# Patient Record
Sex: MALE | Race: WHITE | NOT HISPANIC OR LATINO | Employment: OTHER | URBAN - METROPOLITAN AREA
[De-identification: names, ages, dates, MRNs, and addresses within clinical notes are randomized per-mention and may not be internally consistent; named-entity substitution may affect disease eponyms.]

---

## 2020-03-04 ENCOUNTER — HOSPITAL ENCOUNTER (INPATIENT)
Facility: HOSPITAL | Age: 70
LOS: 2 days | Discharge: NURSING, REHAB OR LONG-TERM CARE | DRG: 378 | End: 2020-03-06
Attending: EMERGENCY MEDICINE | Admitting: EMERGENCY MEDICINE
Payer: MEDICARE

## 2020-03-04 DIAGNOSIS — K92.2 UPPER GI BLEED: Primary | ICD-10-CM

## 2020-03-04 DIAGNOSIS — G40.89 OTHER SEIZURES: ICD-10-CM

## 2020-03-04 DIAGNOSIS — E87.20 LACTIC ACIDOSIS: ICD-10-CM

## 2020-03-04 DIAGNOSIS — D64.9 SYMPTOMATIC ANEMIA: ICD-10-CM

## 2020-03-04 DIAGNOSIS — G20.A1 PARKINSON DISEASE: ICD-10-CM

## 2020-03-04 DIAGNOSIS — I10 ESSENTIAL HYPERTENSION: ICD-10-CM

## 2020-03-04 DIAGNOSIS — E78.2 HYPERLIPIDEMIA, MIXED: ICD-10-CM

## 2020-03-04 DIAGNOSIS — R41.82 ALTERED MENTAL STATUS: ICD-10-CM

## 2020-03-04 DIAGNOSIS — D64.9 ANEMIA, UNSPECIFIED TYPE: ICD-10-CM

## 2020-03-04 LAB
ABO + RH BLD: NORMAL
ALBUMIN SERPL BCP-MCNC: 2.3 G/DL (ref 3.5–5.2)
ALP SERPL-CCNC: 86 U/L (ref 55–135)
ALT SERPL W/O P-5'-P-CCNC: 14 U/L (ref 10–44)
ANION GAP SERPL CALC-SCNC: 9 MMOL/L (ref 8–16)
APTT BLDCRRT: 24.2 SEC (ref 21–32)
AST SERPL-CCNC: 16 U/L (ref 10–40)
BASOPHILS # BLD AUTO: 0.02 K/UL (ref 0–0.2)
BASOPHILS # BLD AUTO: 0.03 K/UL (ref 0–0.2)
BASOPHILS # BLD AUTO: 0.03 K/UL (ref 0–0.2)
BASOPHILS NFR BLD: 0.2 % (ref 0–1.9)
BASOPHILS NFR BLD: 0.3 % (ref 0–1.9)
BASOPHILS NFR BLD: 0.4 % (ref 0–1.9)
BILIRUB SERPL-MCNC: 0.1 MG/DL (ref 0.1–1)
BILIRUB UR QL STRIP: NEGATIVE
BLD GP AB SCN CELLS X3 SERPL QL: NORMAL
BLD PROD TYP BPU: NORMAL
BLD PROD TYP BPU: NORMAL
BLOOD UNIT EXPIRATION DATE: NORMAL
BLOOD UNIT EXPIRATION DATE: NORMAL
BLOOD UNIT TYPE CODE: 6200
BLOOD UNIT TYPE CODE: 6200
BLOOD UNIT TYPE: NORMAL
BLOOD UNIT TYPE: NORMAL
BUN SERPL-MCNC: 72 MG/DL (ref 8–23)
CALCIUM SERPL-MCNC: 7.7 MG/DL (ref 8.7–10.5)
CHLORIDE SERPL-SCNC: 113 MMOL/L (ref 95–110)
CLARITY UR: CLEAR
CO2 SERPL-SCNC: 21 MMOL/L (ref 23–29)
CODING SYSTEM: NORMAL
CODING SYSTEM: NORMAL
COLOR UR: YELLOW
CREAT SERPL-MCNC: 0.9 MG/DL (ref 0.5–1.4)
DIFFERENTIAL METHOD: ABNORMAL
DISPENSE STATUS: NORMAL
DISPENSE STATUS: NORMAL
EOSINOPHIL # BLD AUTO: 0 K/UL (ref 0–0.5)
EOSINOPHIL # BLD AUTO: 0 K/UL (ref 0–0.5)
EOSINOPHIL # BLD AUTO: 0.1 K/UL (ref 0–0.5)
EOSINOPHIL NFR BLD: 0.3 % (ref 0–8)
EOSINOPHIL NFR BLD: 0.4 % (ref 0–8)
EOSINOPHIL NFR BLD: 1 % (ref 0–8)
ERYTHROCYTE [DISTWIDTH] IN BLOOD BY AUTOMATED COUNT: 15 % (ref 11.5–14.5)
ERYTHROCYTE [DISTWIDTH] IN BLOOD BY AUTOMATED COUNT: 15.3 % (ref 11.5–14.5)
ERYTHROCYTE [DISTWIDTH] IN BLOOD BY AUTOMATED COUNT: 15.9 % (ref 11.5–14.5)
EST. GFR  (AFRICAN AMERICAN): >60 ML/MIN/1.73 M^2
EST. GFR  (NON AFRICAN AMERICAN): >60 ML/MIN/1.73 M^2
GLUCOSE SERPL-MCNC: 110 MG/DL (ref 70–110)
GLUCOSE UR QL STRIP: NEGATIVE
HCT VFR BLD AUTO: 18.2 % (ref 40–54)
HCT VFR BLD AUTO: 19.1 % (ref 40–54)
HCT VFR BLD AUTO: 21.7 % (ref 40–54)
HCT VFR BLD AUTO: 25.1 % (ref 40–54)
HCV AB SERPL QL IA: NEGATIVE
HGB BLD-MCNC: 5.3 G/DL (ref 14–18)
HGB BLD-MCNC: 5.5 G/DL (ref 14–18)
HGB BLD-MCNC: 6.6 G/DL (ref 14–18)
HGB BLD-MCNC: 7.9 G/DL (ref 14–18)
HGB UR QL STRIP: NEGATIVE
IMM GRANULOCYTES # BLD AUTO: 0.06 K/UL (ref 0–0.04)
IMM GRANULOCYTES # BLD AUTO: 0.06 K/UL (ref 0–0.04)
IMM GRANULOCYTES # BLD AUTO: 0.1 K/UL (ref 0–0.04)
IMM GRANULOCYTES NFR BLD AUTO: 0.7 % (ref 0–0.5)
IMM GRANULOCYTES NFR BLD AUTO: 0.7 % (ref 0–0.5)
IMM GRANULOCYTES NFR BLD AUTO: 0.9 % (ref 0–0.5)
INR PPP: 1 (ref 0.8–1.2)
KETONES UR QL STRIP: NEGATIVE
LACTATE SERPL-SCNC: 2.4 MMOL/L (ref 0.5–2.2)
LEUKOCYTE ESTERASE UR QL STRIP: NEGATIVE
LYMPHOCYTES # BLD AUTO: 0.5 K/UL (ref 1–4.8)
LYMPHOCYTES # BLD AUTO: 0.9 K/UL (ref 1–4.8)
LYMPHOCYTES # BLD AUTO: 1.1 K/UL (ref 1–4.8)
LYMPHOCYTES NFR BLD: 11.4 % (ref 18–48)
LYMPHOCYTES NFR BLD: 13.2 % (ref 18–48)
LYMPHOCYTES NFR BLD: 4.7 % (ref 18–48)
MCH RBC QN AUTO: 24.6 PG (ref 27–31)
MCH RBC QN AUTO: 25.8 PG (ref 27–31)
MCH RBC QN AUTO: 26.6 PG (ref 27–31)
MCHC RBC AUTO-ENTMCNC: 28.8 G/DL (ref 32–36)
MCHC RBC AUTO-ENTMCNC: 30.4 G/DL (ref 32–36)
MCHC RBC AUTO-ENTMCNC: 31.5 G/DL (ref 32–36)
MCV RBC AUTO: 85 FL (ref 82–98)
MONOCYTES # BLD AUTO: 0.6 K/UL (ref 0.3–1)
MONOCYTES # BLD AUTO: 0.7 K/UL (ref 0.3–1)
MONOCYTES # BLD AUTO: 0.9 K/UL (ref 0.3–1)
MONOCYTES NFR BLD: 10.9 % (ref 4–15)
MONOCYTES NFR BLD: 5.3 % (ref 4–15)
MONOCYTES NFR BLD: 9 % (ref 4–15)
NEUTROPHILS # BLD AUTO: 6.4 K/UL (ref 1.8–7.7)
NEUTROPHILS # BLD AUTO: 6.5 K/UL (ref 1.8–7.7)
NEUTROPHILS # BLD AUTO: 9.6 K/UL (ref 1.8–7.7)
NEUTROPHILS NFR BLD: 73.9 % (ref 38–73)
NEUTROPHILS NFR BLD: 78.1 % (ref 38–73)
NEUTROPHILS NFR BLD: 88.6 % (ref 38–73)
NITRITE UR QL STRIP: NEGATIVE
NRBC BLD-RTO: 0 /100 WBC
NUM UNITS TRANS PACKED RBC: NORMAL
NUM UNITS TRANS PACKED RBC: NORMAL
PH UR STRIP: 6 [PH] (ref 5–8)
PLATELET # BLD AUTO: 198 K/UL (ref 150–350)
PLATELET # BLD AUTO: 205 K/UL (ref 150–350)
PLATELET # BLD AUTO: 218 K/UL (ref 150–350)
PMV BLD AUTO: 10 FL (ref 9.2–12.9)
PMV BLD AUTO: 10.4 FL (ref 9.2–12.9)
PMV BLD AUTO: 10.4 FL (ref 9.2–12.9)
POTASSIUM SERPL-SCNC: 4.9 MMOL/L (ref 3.5–5.1)
PROT SERPL-MCNC: 5 G/DL (ref 6–8.4)
PROT UR QL STRIP: NEGATIVE
PROTHROMBIN TIME: 11 SEC (ref 9–12.5)
RBC # BLD AUTO: 2.24 M/UL (ref 4.6–6.2)
RBC # BLD AUTO: 2.56 M/UL (ref 4.6–6.2)
RBC # BLD AUTO: 2.97 M/UL (ref 4.6–6.2)
SODIUM SERPL-SCNC: 143 MMOL/L (ref 136–145)
SP GR UR STRIP: 1.02 (ref 1–1.03)
T4 FREE SERPL-MCNC: 0.83 NG/DL (ref 0.71–1.51)
TSH SERPL DL<=0.005 MIU/L-ACNC: 4.69 UIU/ML (ref 0.4–4)
URN SPEC COLLECT METH UR: NORMAL
UROBILINOGEN UR STRIP-ACNC: NEGATIVE EU/DL
WBC # BLD AUTO: 10.86 K/UL (ref 3.9–12.7)
WBC # BLD AUTO: 8.26 K/UL (ref 3.9–12.7)
WBC # BLD AUTO: 8.65 K/UL (ref 3.9–12.7)

## 2020-03-04 PROCEDURE — 63600175 PHARM REV CODE 636 W HCPCS: Performed by: PHYSICIAN ASSISTANT

## 2020-03-04 PROCEDURE — 99222 1ST HOSP IP/OBS MODERATE 55: CPT | Mod: ,,, | Performed by: INTERNAL MEDICINE

## 2020-03-04 PROCEDURE — 86803 HEPATITIS C AB TEST: CPT

## 2020-03-04 PROCEDURE — 83605 ASSAY OF LACTIC ACID: CPT

## 2020-03-04 PROCEDURE — 93010 EKG 12-LEAD: ICD-10-PCS | Mod: ,,, | Performed by: INTERNAL MEDICINE

## 2020-03-04 PROCEDURE — 85018 HEMOGLOBIN: CPT

## 2020-03-04 PROCEDURE — 93010 ELECTROCARDIOGRAM REPORT: CPT | Mod: ,,, | Performed by: INTERNAL MEDICINE

## 2020-03-04 PROCEDURE — 36430 TRANSFUSION BLD/BLD COMPNT: CPT

## 2020-03-04 PROCEDURE — 85014 HEMATOCRIT: CPT

## 2020-03-04 PROCEDURE — 36415 COLL VENOUS BLD VENIPUNCTURE: CPT

## 2020-03-04 PROCEDURE — C9113 INJ PANTOPRAZOLE SODIUM, VIA: HCPCS | Performed by: EMERGENCY MEDICINE

## 2020-03-04 PROCEDURE — 85730 THROMBOPLASTIN TIME PARTIAL: CPT

## 2020-03-04 PROCEDURE — 96361 HYDRATE IV INFUSION ADD-ON: CPT

## 2020-03-04 PROCEDURE — 85610 PROTHROMBIN TIME: CPT

## 2020-03-04 PROCEDURE — 25000003 PHARM REV CODE 250: Performed by: PHYSICIAN ASSISTANT

## 2020-03-04 PROCEDURE — 93005 ELECTROCARDIOGRAM TRACING: CPT

## 2020-03-04 PROCEDURE — 96360 HYDRATION IV INFUSION INIT: CPT

## 2020-03-04 PROCEDURE — 81003 URINALYSIS AUTO W/O SCOPE: CPT

## 2020-03-04 PROCEDURE — 84443 ASSAY THYROID STIM HORMONE: CPT

## 2020-03-04 PROCEDURE — 21400001 HC TELEMETRY ROOM

## 2020-03-04 PROCEDURE — 99222 PR INITIAL HOSPITAL CARE,LEVL II: ICD-10-PCS | Mod: ,,, | Performed by: INTERNAL MEDICINE

## 2020-03-04 PROCEDURE — C9113 INJ PANTOPRAZOLE SODIUM, VIA: HCPCS | Performed by: PHYSICIAN ASSISTANT

## 2020-03-04 PROCEDURE — 85025 COMPLETE CBC W/AUTO DIFF WBC: CPT | Mod: 91

## 2020-03-04 PROCEDURE — 86850 RBC ANTIBODY SCREEN: CPT

## 2020-03-04 PROCEDURE — 87040 BLOOD CULTURE FOR BACTERIA: CPT | Mod: 59

## 2020-03-04 PROCEDURE — P9016 RBC LEUKOCYTES REDUCED: HCPCS

## 2020-03-04 PROCEDURE — 86920 COMPATIBILITY TEST SPIN: CPT

## 2020-03-04 PROCEDURE — 80053 COMPREHEN METABOLIC PANEL: CPT

## 2020-03-04 PROCEDURE — 84439 ASSAY OF FREE THYROXINE: CPT

## 2020-03-04 PROCEDURE — 99291 CRITICAL CARE FIRST HOUR: CPT | Mod: 25

## 2020-03-04 PROCEDURE — 63600175 PHARM REV CODE 636 W HCPCS: Performed by: EMERGENCY MEDICINE

## 2020-03-04 RX ORDER — SIMVASTATIN 20 MG/1
20 TABLET, FILM COATED ORAL NIGHTLY
Status: DISCONTINUED | OUTPATIENT
Start: 2020-03-04 | End: 2020-03-06 | Stop reason: HOSPADM

## 2020-03-04 RX ORDER — HYDRALAZINE HYDROCHLORIDE 20 MG/ML
10 INJECTION INTRAMUSCULAR; INTRAVENOUS EVERY 6 HOURS PRN
Status: DISCONTINUED | OUTPATIENT
Start: 2020-03-04 | End: 2020-03-06 | Stop reason: HOSPADM

## 2020-03-04 RX ORDER — CARBIDOPA AND LEVODOPA 25; 100 MG/1; MG/1
1 TABLET ORAL 3 TIMES DAILY
Status: DISCONTINUED | OUTPATIENT
Start: 2020-03-04 | End: 2020-03-06 | Stop reason: HOSPADM

## 2020-03-04 RX ORDER — PANTOPRAZOLE SODIUM 40 MG/10ML
40 INJECTION, POWDER, LYOPHILIZED, FOR SOLUTION INTRAVENOUS 2 TIMES DAILY
Status: DISCONTINUED | OUTPATIENT
Start: 2020-03-04 | End: 2020-03-06 | Stop reason: HOSPADM

## 2020-03-04 RX ORDER — ASPIRIN 81 MG/1
TABLET ORAL
Status: ON HOLD | COMMUNITY
End: 2020-03-08 | Stop reason: SINTOL

## 2020-03-04 RX ORDER — PANTOPRAZOLE SODIUM 40 MG/10ML
40 INJECTION, POWDER, LYOPHILIZED, FOR SOLUTION INTRAVENOUS
Status: COMPLETED | OUTPATIENT
Start: 2020-03-04 | End: 2020-03-04

## 2020-03-04 RX ORDER — CARBIDOPA AND LEVODOPA 25; 100 MG/1; MG/1
1 TABLET ORAL 3 TIMES DAILY
COMMUNITY

## 2020-03-04 RX ORDER — HYDROCODONE BITARTRATE AND ACETAMINOPHEN 500; 5 MG/1; MG/1
TABLET ORAL
Status: DISCONTINUED | OUTPATIENT
Start: 2020-03-05 | End: 2020-03-06 | Stop reason: HOSPADM

## 2020-03-04 RX ORDER — ACETAMINOPHEN 325 MG/1
650 TABLET ORAL EVERY 6 HOURS PRN
Status: DISCONTINUED | OUTPATIENT
Start: 2020-03-04 | End: 2020-03-06 | Stop reason: HOSPADM

## 2020-03-04 RX ORDER — HYDROCODONE BITARTRATE AND ACETAMINOPHEN 500; 5 MG/1; MG/1
TABLET ORAL
Status: DISCONTINUED | OUTPATIENT
Start: 2020-03-04 | End: 2020-03-06 | Stop reason: HOSPADM

## 2020-03-04 RX ORDER — HYDROCODONE BITARTRATE AND ACETAMINOPHEN 5; 325 MG/1; MG/1
1 TABLET ORAL EVERY 6 HOURS PRN
Status: DISCONTINUED | OUTPATIENT
Start: 2020-03-04 | End: 2020-03-06 | Stop reason: HOSPADM

## 2020-03-04 RX ORDER — DEXTROMETHORPHAN HYDROBROMIDE, GUAIFENESIN 5; 100 MG/5ML; MG/5ML
650 LIQUID ORAL EVERY 6 HOURS PRN
COMMUNITY

## 2020-03-04 RX ORDER — ONDANSETRON 8 MG/1
8 TABLET, ORALLY DISINTEGRATING ORAL EVERY 8 HOURS PRN
Status: DISCONTINUED | OUTPATIENT
Start: 2020-03-04 | End: 2020-03-06 | Stop reason: HOSPADM

## 2020-03-04 RX ORDER — TERAZOSIN 2 MG/1
2 CAPSULE ORAL NIGHTLY
COMMUNITY
End: 2020-03-07 | Stop reason: SINTOL

## 2020-03-04 RX ORDER — LEVETIRACETAM 100 MG/ML
500 SOLUTION ORAL 2 TIMES DAILY
Status: DISCONTINUED | OUTPATIENT
Start: 2020-03-04 | End: 2020-03-06 | Stop reason: HOSPADM

## 2020-03-04 RX ORDER — ACETAMINOPHEN 500 MG
5000 TABLET ORAL
COMMUNITY

## 2020-03-04 RX ORDER — CYANOCOBALAMIN 1000 UG/ML
1000 INJECTION, SOLUTION INTRAMUSCULAR; SUBCUTANEOUS
COMMUNITY

## 2020-03-04 RX ORDER — POTASSIUM CHLORIDE 20 MEQ/1
20 TABLET, EXTENDED RELEASE ORAL
COMMUNITY

## 2020-03-04 RX ORDER — SIMVASTATIN 20 MG/1
20 TABLET, FILM COATED ORAL NIGHTLY
COMMUNITY

## 2020-03-04 RX ORDER — LEVETIRACETAM 100 MG/ML
500 SOLUTION ORAL 2 TIMES DAILY
COMMUNITY
Start: 2019-08-26 | End: 2020-08-25

## 2020-03-04 RX ORDER — LYSINE HCL 500 MG
1 TABLET ORAL
COMMUNITY
End: 2020-03-04

## 2020-03-04 RX ADMIN — PANTOPRAZOLE SODIUM 40 MG: 40 INJECTION, POWDER, LYOPHILIZED, FOR SOLUTION INTRAVENOUS at 11:03

## 2020-03-04 RX ADMIN — SIMVASTATIN 20 MG: 20 TABLET, FILM COATED ORAL at 09:03

## 2020-03-04 RX ADMIN — CARBIDOPA AND LEVODOPA 1 TABLET: 25; 100 TABLET ORAL at 09:03

## 2020-03-04 RX ADMIN — PANTOPRAZOLE SODIUM 40 MG: 40 INJECTION, POWDER, LYOPHILIZED, FOR SOLUTION INTRAVENOUS at 09:03

## 2020-03-04 RX ADMIN — HYDROCODONE BITARTRATE AND ACETAMINOPHEN 1 TABLET: 5; 325 TABLET ORAL at 05:03

## 2020-03-04 RX ADMIN — SODIUM CHLORIDE: 0.9 INJECTION, SOLUTION INTRAVENOUS at 05:03

## 2020-03-04 RX ADMIN — LEVETIRACETAM 500 MG: 100 SOLUTION ORAL at 10:03

## 2020-03-04 RX ADMIN — CARBIDOPA AND LEVODOPA 1 TABLET: 25; 100 TABLET ORAL at 02:03

## 2020-03-04 RX ADMIN — SODIUM CHLORIDE, SODIUM LACTATE, POTASSIUM CHLORIDE, AND CALCIUM CHLORIDE 1000 ML: .6; .31; .03; .02 INJECTION, SOLUTION INTRAVENOUS at 07:03

## 2020-03-04 NOTE — ED NOTES
Report received from MARSHALL Burgess. Pt. Resting in bed. No acute distress, RR equal and non labored. Bed in low, locked, and call light in reach. Side rails up X 2. Will continue to monitor.

## 2020-03-04 NOTE — ED NOTES
Patient sent to ER by [facility] for AMS. Patient appeared confused more than his normal according to staff. [facility]ASI reports hypotension with systolic blood pressure in the 80's and administered normal saline 1 liter. Patient is now oriented to person only after 1 liter bolus.     Patient moved to ED room ** via **, patient assisted onto stretcher and changed into a gown. Patient placed on cardiac monitor, continuous pulse oximetry and automatic blood pressure cuff. Bed placed in low locked position, side rails up x 2, call light is within reach of patient or family, orientation to room and explanation of wait provided to family and patient, alarms set and turned on for monitor and pulse ox, awaiting MD evaluation and orders, will continue to monitor.    Patient identifies self as Adebayo Oneil      LOC: The patient is awake, alert and aware of environment with an appropriate affect, the patient is oriented to person only and speaking appropriately.  APPEARANCE: Patient resting comfortably and in no acute distress, patient is clean and well groomed, patient's clothing is properly fastened.  SKIN: The skin is warm and dry, color consistent with ethnicity, patient has normal skin turgor and moist mucus membranes, no breakdown or bruising noted.  MUSCULOSKELETAL: Patient moving upper extremities well, no obvious swelling or deformities noted.  RESPIRATORY: Airway is open and patent, respirations are spontaneous, patient has a normal effort and rate, no accessory muscle use noted.  CARDIAC: Patient has a normal rate and rhythm, no periphreal edema noted, capillary refill < 3 seconds.  ABDOMEN: Soft and non tender to palpation, no distention noted.  NEUROLOGIC: PERRL, 3 mm bilaterally, eyes open spontaneously, behavior appropriate to situation, facial expression symmetrical.

## 2020-03-04 NOTE — ED NOTES
In and out catheter performed using sterile technique. Urine collected on first attempt. Pt tolerated well.

## 2020-03-04 NOTE — ED NOTES
Pt. Resting in bed and has stopped pulling at lines and monitoring equipment. No acute distress, RR equal and non labored, VSS. Bed in low, locked, and call light in reach. Side rails up X 2. Will continue to monitor.

## 2020-03-04 NOTE — ED NOTES
Pt moved to hospital bed at this time. Pt. Resting in bed.  RR equal and non labored, VSS. Bed in low, locked, and call light in reach. Side rails up X 2. Will continue to monitor.

## 2020-03-04 NOTE — SUBJECTIVE & OBJECTIVE
Past Medical History:   Diagnosis Date    Abdominal aortic aneurysm     Acute bronchospasm     Anxiety disorder due to known physiological condition     Atherosclerosis     Candidiasis of skin and nail     Chronic constipation     Conduct disorder, unspecified     Deficiency of other specified B group vitamins     Dehydration     Essential hypertension     Extended spectrum beta lactamase (ESBL) resistance     Generalized anxiety disorder     GERD with esophagitis     Hyperlipidemia, mixed     Other seasonal allergic rhinitis     Other seizures     Parkinson disease     Seizure     Ulcerative colitis     Vitamin D deficiency        No past surgical history on file.    Review of patient's allergies indicates:  No Known Allergies    No current facility-administered medications on file prior to encounter.      Current Outpatient Medications on File Prior to Encounter   Medication Sig    aspirin (ECOTRIN) 81 MG EC tablet aspirin 81 mg tablet,delayed release   Direction: 1 tablet; 81 MG; Once a day;  Dispense Unit: Tablet Delayed Release;  Dose Route: Orally  Date Received : 06/03/2016    CALCIUM CARBONATE ORAL Take 500 mg by mouth once daily.    calcium carbonate-vit D3-min 600 mg calcium- 400 unit Tab Take 1 tablet by mouth.    carbidopa-levodopa  mg (SINEMET)  mg per tablet Take 1 tablet by mouth 3 (three) times daily.     cholecalciferol, vitamin D3, 125 mcg (5,000 unit) Tab Take 5,000 Units by mouth.    cyanocobalamin 1,000 mcg/mL injection Inject 1,000 mcg into the muscle.     levETIRAcetam (KEPPRA) 100 mg/mL Soln Take 500 mg by mouth 2 (two) times daily.     potassium chloride SA (K-DUR,KLOR-CON) 20 MEQ tablet Take 20 mEq by mouth.    simvastatin (ZOCOR) 20 MG tablet Take 20 mg by mouth every evening.     terazosin (HYTRIN) 2 MG capsule Take 2 mg by mouth every evening.     acetaminophen (TYLENOL) 650 MG TbSR Take 650 mg by mouth every 6 (six) hours as needed.     Family  History     None        Tobacco Use    Smoking status: Not on file   Substance and Sexual Activity    Alcohol use: Not on file    Drug use: Not on file    Sexual activity: Not on file     Review of Systems   Unable to perform ROS: Dementia     Objective:     Vital Signs (Most Recent):  Temp: 97.1 °F (36.2 °C) (03/04/20 1208)  Pulse: 79 (03/04/20 1208)  Resp: 17 (03/04/20 1208)  BP: 96/60 (03/04/20 1208)  SpO2: 100 % (03/04/20 1208) Vital Signs (24h Range):  Temp:  [97 °F (36.1 °C)-97.7 °F (36.5 °C)] 97.1 °F (36.2 °C)  Pulse:  [68-86] 79  Resp:  [11-23] 17  SpO2:  [98 %-100 %] 100 %  BP: ()/(51-76) 96/60     Weight: 66.6 kg (146 lb 13.2 oz)  There is no height or weight on file to calculate BMI.    Physical Exam   Constitutional: He appears well-developed and well-nourished.   HENT:   Head: Normocephalic and atraumatic.   Eyes: Conjunctivae are normal.   Neck: Neck supple. No JVD present.   Cardiovascular: Normal rate, regular rhythm and normal heart sounds.   Pulmonary/Chest: Effort normal and breath sounds normal. He has no wheezes.   Abdominal: Soft. Bowel sounds are normal. He exhibits no distension. There is no tenderness.   Musculoskeletal: Normal range of motion.   Bilateral upper extremity contractures noted.    Neurological: He is alert.   Oriented to person. Disoriented to place and time.    Skin: Skin is warm and dry. No rash noted.   Psychiatric: He has a normal mood and affect. His behavior is normal. Thought content normal.   Nursing note and vitals reviewed.          Significant Labs:   CBC:   Recent Labs   Lab 03/04/20  0746 03/04/20  1015   WBC 10.86  --    HGB 5.5* 5.3*   HCT 19.1* 18.2*     --      CMP:   Recent Labs   Lab 03/04/20  0746      K 4.9   *   CO2 21*      BUN 72*   CREATININE 0.9   CALCIUM 7.7*   PROT 5.0*   ALBUMIN 2.3*   BILITOT 0.1   ALKPHOS 86   AST 16   ALT 14   ANIONGAP 9   EGFRNONAA >60     Lactic Acid:   Recent Labs   Lab 03/04/20  0746    LACTATE 2.4*     All pertinent labs within the past 24 hours have been reviewed.    Significant Imaging: I have reviewed all pertinent imaging results/findings within the past 24 hours.    Imaging Results          X-Ray Chest AP Portable (Final result)  Result time 03/04/20 09:29:53    Final result by Luiz Kinney MD (03/04/20 09:29:53)                 Impression:      Significant elevation of the right hemidiaphragm with likely adjacent atelectasis, otherwise no acute radiographic abnormality in the chest.      Electronically signed by: Luiz Kinney  Date:    03/04/2020  Time:    09:29             Narrative:    EXAMINATION:  XR CHEST AP PORTABLE    CLINICAL HISTORY:  AMS;    TECHNIQUE:  Single frontal view of the chest was performed.    COMPARISON:  None    FINDINGS:  Cardiac leads project over the chest.  Significant elevation of the right hemidiaphragm with likely adjacent atelectasis.  No large consolidation or effusion.  No pneumothorax.  Visualized osseous structures appear intact.  Degenerative changes of both shoulders.                               CT Head Without Contrast (Final result)  Result time 03/04/20 08:27:09    Final result by Luiz Kinney MD (03/04/20 08:27:09)                 Impression:      No acute intracranial infarct or hemorrhage.    Prominence of the ventricular system suspicious for normal pressure hydrocephalus.    All CT scans at this facility use dose modulation, iterative reconstruction, and/or weight base dosing when appropriate to reduce radiation dose to as low as reasonably achievable.      Electronically signed by: Luiz Kinney  Date:    03/04/2020  Time:    08:27             Narrative:    EXAMINATION:  CT HEAD WITHOUT CONTRAST    CLINICAL HISTORY:  Confusion/delirium, altered LOC, unexplained;    TECHNIQUE:  Contiguous axial images were obtained from the skull base through the vertex without intravenous contrast.    COMPARISON:  None    FINDINGS:  No  intracranial hemorrhage. No mass effect or midline shift. No abnormal parenchymal hypoattenuation to suggest acute or recent major vascular territory cerebral infarct.  Mild prominence of the ventricular system with crowding of the sulci at the vertex and upward bowing of the corpus callosum concerning for normal pressure hydrocephalus.  The paranasal sinuses and mastoid air cells are clear.  No concerning osseous findings.

## 2020-03-04 NOTE — ED NOTES
Ok to proceed with blood transfusion at this time per Dr. Triana. Consent signed by MD and 2 nurse signature for emergent blood as patient is unable to sign for himself s/t to cognitive status.

## 2020-03-04 NOTE — H&P
[facility] Hospital Medicine  History & Physical    Patient Name: Adebayo Oneil  MRN: [id number]  Admission Date: 3/4/2020  Attending Physician: Attila Triana MD   Primary Care Provider: Provider [facility]         Patient information was obtained from patient, past medical records and ER records.     Subjective:     Principal Problem:Upper GI bleed    Chief Complaint:   Chief Complaint   Patient presents with    Altered Mental Status     [facility] , +[facility], hypotension and weak         HPI: Adebayo Oneil is a 69 year old male with Parkinson disease, hypertension and anxiety who [facility] at [facility] [place] and presented to the ED with reports of altered mentation upon standing this morning. The patient complains of right shoulder pain, but is unable to provide any additional history due to a baseline of disorientation. Upon review of his records sent by the nursing home, the patient is currently receiving Augmentin for a UTI and he had a BMP of 1/15/20 which showed a BUN of 25, creatinine of 0.88. In Care Everywhere, on 9/15/19, he had a hemoglobin of 12.8, BUN of 31 and creatinine of 1.52. Today, he was found to have a hemoglobin of 5.5, creatinine of 0.9 and BUN of 72, total protein of 5, albumin of 2.3 and lactic acid of 2.4. His code status is noted to be DNR and Danni Howell is his power of .     Past Medical History:   Diagnosis Date    Abdominal aortic aneurysm     Acute bronchospasm     Anxiety disorder due to known physiological condition     Atherosclerosis     Candidiasis of skin and nail     Chronic constipation     Conduct disorder, unspecified     Deficiency of other specified B group vitamins     Dehydration     Essential hypertension     Extended spectrum beta lactamase (ESBL) resistance     Generalized anxiety disorder     GERD with esophagitis     Hyperlipidemia, mixed     Other seasonal allergic rhinitis     Other seizures      Parkinson disease     Seizure     Ulcerative colitis     Vitamin D deficiency        No past surgical history on file.    Review of patient's allergies indicates:  No Known Allergies    No current facility-administered medications on file prior to encounter.      Current Outpatient Medications on File Prior to Encounter   Medication Sig    aspirin (ECOTRIN) 81 MG EC tablet aspirin 81 mg tablet,delayed release   Direction: 1 tablet; 81 MG; Once a day;  Dispense Unit: Tablet Delayed Release;  Dose Route: Orally  Date Received : 06/03/2016    CALCIUM CARBONATE ORAL Take 500 mg by mouth once daily.    calcium carbonate-vit D3-min 600 mg calcium- 400 unit Tab Take 1 tablet by mouth.    carbidopa-levodopa  mg (SINEMET)  mg per tablet Take 1 tablet by mouth 3 (three) times daily.     cholecalciferol, vitamin D3, 125 mcg (5,000 unit) Tab Take 5,000 Units by mouth.    cyanocobalamin 1,000 mcg/mL injection Inject 1,000 mcg into the muscle.     levETIRAcetam (KEPPRA) 100 mg/mL Soln Take 500 mg by mouth 2 (two) times daily.     potassium chloride SA (K-DUR,KLOR-CON) 20 MEQ tablet Take 20 mEq by mouth.    simvastatin (ZOCOR) 20 MG tablet Take 20 mg by mouth every evening.     terazosin (HYTRIN) 2 MG capsule Take 2 mg by mouth every evening.     acetaminophen (TYLENOL) 650 MG TbSR Take 650 mg by mouth every 6 (six) hours as needed.     Family History     Reviewed and not pertinent.         Tobacco Use    Smoking status: Not on file   Substance and Sexual Activity    Alcohol use: Not on file    Drug use: Not on file    Sexual activity: Not on file     Review of Systems   Unable to perform ROS: Dementia     Objective:     Vital Signs (Most Recent):  Temp: 97.1 °F (36.2 °C) (03/04/20 1208)  Pulse: 79 (03/04/20 1208)  Resp: 17 (03/04/20 1208)  BP: 96/60 (03/04/20 1208)  SpO2: 100 % (03/04/20 1208) Vital Signs (24h Range):  Temp:  [97 °F (36.1 °C)-97.7 °F (36.5 °C)] 97.1 °F (36.2 °C)  Pulse:  [68-86]  79  Resp:  [11-23] 17  SpO2:  [98 %-100 %] 100 %  BP: ()/(51-76) 96/60     Weight: 66.6 kg (146 lb 13.2 oz)  There is no height or weight on file to calculate BMI.    Physical Exam   Constitutional: He appears well-developed and well-nourished.   HENT:   Head: Normocephalic and atraumatic.   Eyes: Conjunctivae are normal.   Neck: Neck supple. No JVD present.   Cardiovascular: Normal rate, regular rhythm and normal heart sounds.   Pulmonary/Chest: Effort normal and breath sounds normal. He has no wheezes.   Abdominal: Soft. Bowel sounds are normal. He exhibits no distension. There is no tenderness.   Musculoskeletal: Normal range of motion.   Bilateral upper extremity contractures noted.    Neurological: He is alert.   Oriented to person. Disoriented to place and time.    Skin: Skin is warm and dry. No rash noted.   Psychiatric: He has a normal mood and affect. His behavior is normal. Thought content normal.   Nursing note and vitals reviewed.          Significant Labs:   CBC:   Recent Labs   Lab 03/04/20  0746 03/04/20  1015   WBC 10.86  --    HGB 5.5* 5.3*   HCT 19.1* 18.2*     --      CMP:   Recent Labs   Lab 03/04/20  0746      K 4.9   *   CO2 21*      BUN 72*   CREATININE 0.9   CALCIUM 7.7*   PROT 5.0*   ALBUMIN 2.3*   BILITOT 0.1   ALKPHOS 86   AST 16   ALT 14   ANIONGAP 9   EGFRNONAA >60     Lactic Acid:   Recent Labs   Lab 03/04/20  0746   LACTATE 2.4*     All pertinent labs within the past 24 hours have been reviewed.    Significant Imaging: I have reviewed all pertinent imaging results/findings within the past 24 hours.    Imaging Results          X-Ray Chest AP Portable (Final result)  Result time 03/04/20 09:29:53    Final result by Luiz Kinney MD (03/04/20 09:29:53)                 Impression:      Significant elevation of the right hemidiaphragm with likely adjacent atelectasis, otherwise no acute radiographic abnormality in the chest.      Electronically signed  by: Luiz Kinney  Date:    03/04/2020  Time:    09:29             Narrative:    EXAMINATION:  XR CHEST AP PORTABLE    CLINICAL HISTORY:  AMS;    TECHNIQUE:  Single frontal view of the chest was performed.    COMPARISON:  None    FINDINGS:  Cardiac leads project over the chest.  Significant elevation of the right hemidiaphragm with likely adjacent atelectasis.  No large consolidation or effusion.  No pneumothorax.  Visualized osseous structures appear intact.  Degenerative changes of both shoulders.                               CT Head Without Contrast (Final result)  Result time 03/04/20 08:27:09    Final result by Luiz Kinney MD (03/04/20 08:27:09)                 Impression:      No acute intracranial infarct or hemorrhage.    Prominence of the ventricular system suspicious for normal pressure hydrocephalus.    All CT scans at this facility use dose modulation, iterative reconstruction, and/or weight base dosing when appropriate to reduce radiation dose to as low as reasonably achievable.      Electronically signed by: Luiz Kinney  Date:    03/04/2020  Time:    08:27             Narrative:    EXAMINATION:  CT HEAD WITHOUT CONTRAST    CLINICAL HISTORY:  Confusion/delirium, altered LOC, unexplained;    TECHNIQUE:  Contiguous axial images were obtained from the skull base through the vertex without intravenous contrast.    COMPARISON:  None    FINDINGS:  No intracranial hemorrhage. No mass effect or midline shift. No abnormal parenchymal hypoattenuation to suggest acute or recent major vascular territory cerebral infarct.  Mild prominence of the ventricular system with crowding of the sulci at the vertex and upward bowing of the corpus callosum concerning for normal pressure hydrocephalus.  The paranasal sinuses and mastoid air cells are clear.  No concerning osseous findings.                                  Assessment/Plan:     * Upper GI bleed  -IV Protonix.   -Transfuse 2 units PRBCs.   -Monitor  hemoglobin and hematocrit.   -GI consult.     Symptomatic anemia  -Transfuse 2 units PRBCs.   -Monitor hemoglobin and hematocrit.       Lactic acidosis  -Likely due to volume loss associated with GI bleeding.       Essential hypertension  -Hold Hytrin due to GI bleeding.   -Hydralazine as needed.       Hyperlipidemia, mixed  Continue statin.       Other seizures  Continue Keppra.       Parkinson disease  -Stable.   -Continue Sinemet.         VTE Risk Mitigation (From admission, onward)         Ordered     Place sequential compression device  Until discontinued      03/04/20 1122     IP VTE HIGH RISK PATIENT  Once      03/04/20 1122                   CRYSTAL Jane  Department of Hospital Medicine   [facility]

## 2020-03-04 NOTE — ED NOTES
Pt pulling at IVs and monitoring equipment and putting legs in air. Olinda, ERT to sit with patient to attempt redirection techniques. Hospital Medicine Team C notified via secure chat.

## 2020-03-04 NOTE — ED PROVIDER NOTES
SCRIBE #1 NOTE: I, Matthieu Quiroga, am scribing for, and in the presence of, Attila Triana MD. I have scribed the entire note.      History      Chief Complaint   Patient presents with    Altered Mental Status     [facility] , +[facility]usion, hypotension and weak      Review of patient's allergies indicates:  No Known Allergies     HPI   HPI    3/4/2020, 7:42 AM   History obtained from [facility]  HPI and ROS limited secondary to pt's AMS      History of Present Illness: Adebayo Oneil is a 69 y.o. male patient who presents to the Emergency Department for AMS, onset just PTA. [facility] reports that pt was confused and weak at the scene, with a systolic BP of 80. Symptoms are constant and moderate in severity. No mitigating or exacerbating factors reported. No associated sxs reported. Patient is unable to provide any pertinent negatives at this time. Pt was given 1 liter of normal saline en route to the ED. No further complaints or concerns at this time.     Arrival mode: [facility]    PCP: [facility]       Past Medical History:  Past Medical History:   Diagnosis Date    Abdominal aortic aneurysm     Acute bronchospasm     Anxiety disorder due to known physiological condition     Atherosclerosis     Candidiasis of skin and nail     Chronic constipation     Conduct disorder, unspecified     Deficiency of other specified B group vitamins     Dehydration     Essential hypertension     Extended spectrum beta lactamase (ESBL) resistance     Generalized anxiety disorder     GERD with esophagitis     Hyperlipidemia, mixed     Other seasonal allergic rhinitis     Other seizures     Parkinson disease     Seizure     Ulcerative colitis     Vitamin D deficiency        Past Surgical History:  No past surgical history on file.      Family History:  No family history on file.    Social History:  Social History     Tobacco Use    Smoking status: Not on file   Substance and Sexual Activity    Alcohol use: Not on  file    Drug use: Not on file    Sexual activity: Not on file       ROS   Review of Systems   Unable to perform ROS: Mental status change   Neurological: Positive for weakness.   Psychiatric/Behavioral: Positive for confusion.     Physical Exam      Initial Vitals [03/04/20 0728]   BP Pulse Resp Temp SpO2   120/76 68 14 97.7 °F (36.5 °C) 98 %      MAP       --          Physical Exam  Nursing Notes and Vital Signs Reviewed.  Constitutional: Patient is in no acute distress.  Head: Atraumatic. Normocephalic.  Eyes: PERRL. EOM intact. Conjunctivae are not pale. No scleral icterus.  ENT: Mucous membranes are moist. Oropharynx is clear and symmetric.    Neck: Supple. Full ROM. No lymphadenopathy.  Cardiovascular: Regular rate. Regular rhythm. No murmurs, rubs, or gallops. Distal pulses are 2+ and symmetric.  Pulmonary/Chest: No respiratory distress. Faint basilar crackles.  Abdominal: Soft and non-distended.  There is no tenderness.  No rebound, guarding, or rigidity.   Rectal: Male chaperone present for the duration of the rectal exam.There is no tenderness. No masses or hemorrhoids. Normal sphincter tone. No melena or hematochezia.  Musculoskeletal: Moves all extremities. No obvious deformities. No edema.  Skin: Warm and dry.  Neurological: Pt is oriented to person only. Pt appears sleepy, but is able to respond to commands.   Psychiatric: Normal affect. Good eye contact. Appropriate in content.    ED Course    Critical Care  Date/Time: 3/4/2020 9:45 AM  Performed by: Attila Triana MD  Authorized by: Attila Triana MD   Direct patient critical care time: 15 minutes  Additional history critical care time: 5 minutes  Ordering / reviewing critical care time: 5 minutes  Documentation critical care time: 5 minutes  Consulting other physicians critical care time: 5 minutes  Total critical care time (exclusive of procedural time) : 35 minutes  Critical care time was exclusive of separately billable procedures and treating  other patients and teaching time.  Critical care was necessary to treat or prevent imminent or life-threatening deterioration of the following conditions: Lactic acidosis, anemia, AMS.  Critical care was time spent personally by me on the following activities: blood draw for specimens, development of treatment plan with patient or surrogate, discussions with consultants, interpretation of cardiac output measurements, evaluation of patient's response to treatment, examination of patient, obtaining history from patient or surrogate, ordering and performing treatments and interventions, ordering and review of laboratory studies, ordering and review of radiographic studies, pulse oximetry and re-evaluation of patient's condition.        ED Vital Signs:  Vitals:    03/04/20 1108 03/04/20 1123 03/04/20 1127 03/04/20 1208   BP: (!) 124/51 91/61  96/60   Pulse: 80 77 76 79   Resp: 20 20  17   Temp: 97 °F (36.1 °C) 97.1 °F (36.2 °C)  97.1 °F (36.2 °C)   TempSrc: Axillary Axillary  Oral   SpO2: 99% 100%  100%   Weight:        03/04/20 1332 03/04/20 1414 03/04/20 1426 03/04/20 1433   BP: (!) 111/55 116/71 103/66 102/74   Pulse: 80 80 80 81   Resp: 20 18 18 16   Temp: 96.8 °F (36 °C) 96.9 °F (36.1 °C) 97.5 °F (36.4 °C) 97.8 °F (36.6 °C)   TempSrc: Axillary Axillary Axillary Axillary   SpO2: 99% 99% 100% 99%   Weight:        03/04/20 1445 03/04/20 1448 03/04/20 1534 03/04/20 1634   BP: 102/74 (!) 101/51 (!) 119/59 105/64   Pulse: 79 80 79 80   Resp: 16 20 16 20   Temp: 97.9 °F (36.6 °C) 97.4 °F (36.3 °C) 98.6 °F (37 °C)    TempSrc: Axillary Axillary Axillary Axillary   SpO2: 99% 100% 99% 99%   Weight:        03/04/20 1725 03/04/20 1756 03/04/20 1805   BP: 111/74 127/67 (!) 110/57   Pulse: 82 79 79   Resp: 18 18 18   Temp: 98 °F (36.7 °C) 97.8 °F (36.6 °C) 97.9 °F (36.6 °C)   TempSrc: Axillary Axillary Axillary   SpO2: 99% 98% 97%   Weight:          Abnormal Lab Results:  Labs Reviewed   CBC W/ AUTO DIFFERENTIAL - Abnormal;  Notable for the following components:       Result Value    RBC 2.24 (*)     Hemoglobin 5.5 (*)     Hematocrit 19.1 (*)     Mean Corpuscular Hemoglobin 24.6 (*)     Mean Corpuscular Hemoglobin Conc 28.8 (*)     RDW 15.9 (*)     Immature Granulocytes 0.9 (*)     Gran # (ANC) 9.6 (*)     Immature Grans (Abs) 0.10 (*)     Lymph # 0.5 (*)     Gran% 88.6 (*)     Lymph% 4.7 (*)     All other components within normal limits    Narrative:     Hgb, Hct critical result(s) called and verbal readback obtained from   Dr. Cordero by [facility] 03/04/2020 08:15   COMPREHENSIVE METABOLIC PANEL - Abnormal; Notable for the following components:    Chloride 113 (*)     CO2 21 (*)     BUN, Bld 72 (*)     Calcium 7.7 (*)     Total Protein 5.0 (*)     Albumin 2.3 (*)     All other components within normal limits   TSH - Abnormal; Notable for the following components:    TSH 4.690 (*)     All other components within normal limits   LACTIC ACID, PLASMA - Abnormal; Notable for the following components:    Lactate (Lactic Acid) 2.4 (*)     All other components within normal limits   HEMOGLOBIN - Abnormal; Notable for the following components:    Hemoglobin 5.3 (*)     All other components within normal limits    Narrative:     Hgb, Hct critical result(s) called and verbal readback obtained from   Kristan Javier RN  by [facility] 03/04/2020 10:31   HEMATOCRIT - Abnormal; Notable for the following components:    Hematocrit 18.2 (*)     All other components within normal limits    Narrative:     Hgb, Hct critical result(s) called and verbal readback obtained from   Kristan Javier RN  by [facility] 03/04/2020 10:31   CBC W/ AUTO DIFFERENTIAL - Abnormal; Notable for the following components:    RBC 2.56 (*)     Hemoglobin 6.6 (*)     Hematocrit 21.7 (*)     Mean Corpuscular Hemoglobin 25.8 (*)     Mean Corpuscular Hemoglobin Conc 30.4 (*)     RDW 15.3 (*)     Immature Granulocytes 0.7 (*)     Immature Grans (Abs) 0.06 (*)     Lymph # 0.9 (*)     Gran% 78.1  (*)     Lymph% 11.4 (*)     All other components within normal limits   CULTURE, BLOOD   CULTURE, BLOOD   PROTIME-INR   URINALYSIS, REFLEX TO URINE CULTURE    Narrative:     Preferred Collection Type->Urine, Clean Catch   APTT   HEPATITIS C ANTIBODY   T4, FREE   CBC W/ AUTO DIFFERENTIAL   TYPE & SCREEN   PREPARE RBC SOFT        All Lab Results:  Results for orders placed or performed during the hospital encounter of 03/04/20   CBC auto differential   Result Value Ref Range    WBC 10.86 3.90 - 12.70 K/uL    RBC 2.24 (L) 4.60 - 6.20 M/uL    Hemoglobin 5.5 (LL) 14.0 - 18.0 g/dL    Hematocrit 19.1 (LL) 40.0 - 54.0 %    Mean Corpuscular Volume 85 82 - 98 fL    Mean Corpuscular Hemoglobin 24.6 (L) 27.0 - 31.0 pg    Mean Corpuscular Hemoglobin Conc 28.8 (L) 32.0 - 36.0 g/dL    RDW 15.9 (H) 11.5 - 14.5 %    Platelets 218 150 - 350 K/uL    MPV 10.4 9.2 - 12.9 fL    Immature Granulocytes 0.9 (H) 0.0 - 0.5 %    Gran # (ANC) 9.6 (H) 1.8 - 7.7 K/uL    Immature Grans (Abs) 0.10 (H) 0.00 - 0.04 K/uL    Lymph # 0.5 (L) 1.0 - 4.8 K/uL    Mono # 0.6 0.3 - 1.0 K/uL    Eos # 0.0 0.0 - 0.5 K/uL    Baso # 0.02 0.00 - 0.20 K/uL    nRBC 0 0 /100 WBC    Gran% 88.6 (H) 38.0 - 73.0 %    Lymph% 4.7 (L) 18.0 - 48.0 %    Mono% 5.3 4.0 - 15.0 %    Eosinophil% 0.3 0.0 - 8.0 %    Basophil% 0.2 0.0 - 1.9 %    Differential Method Automated    Comprehensive metabolic panel   Result Value Ref Range    Sodium 143 136 - 145 mmol/L    Potassium 4.9 3.5 - 5.1 mmol/L    Chloride 113 (H) 95 - 110 mmol/L    CO2 21 (L) 23 - 29 mmol/L    Glucose 110 70 - 110 mg/dL    BUN, Bld 72 (H) 8 - 23 mg/dL    Creatinine 0.9 0.5 - 1.4 mg/dL    Calcium 7.7 (L) 8.7 - 10.5 mg/dL    Total Protein 5.0 (L) 6.0 - 8.4 g/dL    Albumin 2.3 (L) 3.5 - 5.2 g/dL    Total Bilirubin 0.1 0.1 - 1.0 mg/dL    Alkaline Phosphatase 86 55 - 135 U/L    AST 16 10 - 40 U/L    ALT 14 10 - 44 U/L    Anion Gap 9 8 - 16 mmol/L    eGFR if African American >60 >60 mL/min/1.73 m^2    eGFR if non African  American >60 >60 mL/min/1.73 m^2   Protime-INR   Result Value Ref Range    Prothrombin Time 11.0 9.0 - 12.5 sec    INR 1.0 0.8 - 1.2   Urinalysis, Reflex to Urine Culture Urine, Clean Catch   Result Value Ref Range    Specimen UA Urine, Catheterized     Color, UA Yellow Yellow, Straw, Alma Delia    Appearance, UA Clear Clear    pH, UA 6.0 5.0 - 8.0    Specific Gravity, UA 1.020 1.005 - 1.030    Protein, UA Negative Negative    Glucose, UA Negative Negative    Ketones, UA Negative Negative    Bilirubin (UA) Negative Negative    Occult Blood UA Negative Negative    Nitrite, UA Negative Negative    Urobilinogen, UA Negative <2.0 EU/dL    Leukocytes, UA Negative Negative   TSH   Result Value Ref Range    TSH 4.690 (H) 0.400 - 4.000 uIU/mL   Lactic acid, plasma   Result Value Ref Range    Lactate (Lactic Acid) 2.4 (H) 0.5 - 2.2 mmol/L   APTT   Result Value Ref Range    aPTT 24.2 21.0 - 32.0 sec   Hepatitis C antibody   Result Value Ref Range    Hepatitis C Ab Negative Negative   T4, free   Result Value Ref Range    Free T4 0.83 0.71 - 1.51 ng/dL   Hemoglobin   Result Value Ref Range    Hemoglobin 5.3 (LL) 14.0 - 18.0 g/dL   Hematocrit   Result Value Ref Range    Hematocrit 18.2 (LL) 40.0 - 54.0 %   CBC auto differential   Result Value Ref Range    WBC 8.26 3.90 - 12.70 K/uL    RBC 2.56 (L) 4.60 - 6.20 M/uL    Hemoglobin 6.6 (L) 14.0 - 18.0 g/dL    Hematocrit 21.7 (L) 40.0 - 54.0 %    Mean Corpuscular Volume 85 82 - 98 fL    Mean Corpuscular Hemoglobin 25.8 (L) 27.0 - 31.0 pg    Mean Corpuscular Hemoglobin Conc 30.4 (L) 32.0 - 36.0 g/dL    RDW 15.3 (H) 11.5 - 14.5 %    Platelets 205 150 - 350 K/uL    MPV 10.4 9.2 - 12.9 fL    Immature Granulocytes 0.7 (H) 0.0 - 0.5 %    Gran # (ANC) 6.5 1.8 - 7.7 K/uL    Immature Grans (Abs) 0.06 (H) 0.00 - 0.04 K/uL    Lymph # 0.9 (L) 1.0 - 4.8 K/uL    Mono # 0.7 0.3 - 1.0 K/uL    Eos # 0.0 0.0 - 0.5 K/uL    Baso # 0.03 0.00 - 0.20 K/uL    nRBC 0 0 /100 WBC    Gran% 78.1 (H) 38.0 - 73.0 %     Lymph% 11.4 (L) 18.0 - 48.0 %    Mono% 9.0 4.0 - 15.0 %    Eosinophil% 0.4 0.0 - 8.0 %    Basophil% 0.4 0.0 - 1.9 %    Differential Method Automated    Type & Screen   Result Value Ref Range    Group & Rh A POS     Indirect Tiffany NEG    Prepare RBC 2 Units; Emergency   Result Value Ref Range    UNIT NUMBER [id number]     Product Code F7910V28     DISPENSE STATUS ISSUED     CODING SYSTEM [id number]     Unit Blood Type Code 6200     Unit Blood Type A POS     Unit Expiration [id number]     UNIT NUMBER [id number]     Product Code F6059O73     DISPENSE STATUS ISSUED     CODING SYSTEM [id number]     Unit Blood Type Code 6200     Unit Blood Type A POS     Unit Expiration [id number]      Imaging Results:  Imaging Results          X-ray Shoulder 2 or More Views Right (Final result)  Result time 03/04/20 16:13:30    Final result by Eusebio Abdul MD (03/04/20 16:13:30)                 Impression:      No acute fracture or dislocation.      Electronically signed by: Eusebio Abdul MD  Date:    03/04/2020  Time:    16:13             Narrative:    EXAMINATION:  XR SHOULDER COMPLETE 2 OR MORE VIEWS RIGHT    CLINICAL HISTORY:  XR SHOULDER COMPLETE 2 OR MORE VIEWS RIGHT    COMPARISON:  None    FINDINGS:  Three views of the right shoulder were obtained.    No evidence of acute fracture or dislocation.  Diffuse osteopenia.  Advanced degenerative changes of the AC joint and glenohumeral joint.  Soft tissues are unremarkable.   Colonic interposition suspected beneath the right hemidiaphragm.  Atelectasis right lung base suspected.                               X-Ray Chest AP Portable (Final result)  Result time 03/04/20 09:29:53    Final result by Luiz Kinney MD (03/04/20 09:29:53)                 Impression:      Significant elevation of the right hemidiaphragm with likely adjacent atelectasis, otherwise no acute radiographic abnormality in the chest.      Electronically signed by: Luiz  Nirmal  Date:    03/04/2020  Time:    09:29             Narrative:    EXAMINATION:  XR CHEST AP PORTABLE    CLINICAL HISTORY:  AMS;    TECHNIQUE:  Single frontal view of the chest was performed.    COMPARISON:  None    FINDINGS:  Cardiac leads project over the chest.  Significant elevation of the right hemidiaphragm with likely adjacent atelectasis.  No large consolidation or effusion.  No pneumothorax.  Visualized osseous structures appear intact.  Degenerative changes of both shoulders.                               CT Head Without Contrast (Final result)  Result time 03/04/20 08:27:09    Final result by Luiz Kinney MD (03/04/20 08:27:09)                 Impression:      No acute intracranial infarct or hemorrhage.    Prominence of the ventricular system suspicious for normal pressure hydrocephalus.    All CT scans at this facility use dose modulation, iterative reconstruction, and/or weight base dosing when appropriate to reduce radiation dose to as low as reasonably achievable.      Electronically signed by: Luiz Kinney  Date:    03/04/2020  Time:    08:27             Narrative:    EXAMINATION:  CT HEAD WITHOUT CONTRAST    CLINICAL HISTORY:  Confusion/delirium, altered LOC, unexplained;    TECHNIQUE:  Contiguous axial images were obtained from the skull base through the vertex without intravenous contrast.    COMPARISON:  None    FINDINGS:  No intracranial hemorrhage. No mass effect or midline shift. No abnormal parenchymal hypoattenuation to suggest acute or recent major vascular territory cerebral infarct.  Mild prominence of the ventricular system with crowding of the sulci at the vertex and upward bowing of the corpus callosum concerning for normal pressure hydrocephalus.  The paranasal sinuses and mastoid air cells are clear.  No concerning osseous findings.                               The EKG was ordered, reviewed, and independently interpreted by the ED provider.  Interpretation time:  07:37  Rate: 91 BPM  Rhythm: normal sinus rhythm  Interpretation: Septal infarct, age undetermined. No STEMI.           The Emergency Provider reviewed the vital signs and test results, which are outlined above.    ED Discussion     10:19 AM: Discussed case with CRYSTAL Jane ([facility]). Dr. Stout agrees with current care and management of pt and accepts admission.  Admitting Service: [facility]  Admitting Physician: Dr. Stout  Admit to: Med Tele    10:20 AM: Re-evaluated pt. I have discussed test results, shared treatment plan, and the need for admission with patient. Pt expresses understanding at this time and agree with all information. All questions answered. Pt has no further questions or concerns at this time. Pt is ready for admit.         ED Medication(s):  Medications   0.9%  NaCl infusion (for blood administration) ( Intravenous New Bag 3/4/20 1725)   pantoprazole injection 40 mg (40 mg Intravenous Not Given 3/4/20 1130)   carbidopa-levodopa  mg per tablet 1 tablet (1 tablet Oral Given 3/4/20 1449)   levETIRAcetam 100 mg/mL solution 500 mg (has no administration in time range)   simvastatin tablet 20 mg (has no administration in time range)   hydrALAZINE injection 10 mg (has no administration in time range)   acetaminophen tablet 650 mg (has no administration in time range)   HYDROcodone-acetaminophen 5-325 mg per tablet 1 tablet (1 tablet Oral Given 3/4/20 1757)   ondansetron disintegrating tablet 8 mg (has no administration in time range)   lactated ringers bolus 1,000 mL (0 mLs Intravenous Stopped 3/4/20 1007)   pantoprazole injection 40 mg (40 mg Intravenous Given 3/4/20 1101)     Current Discharge Medication List              Medical Decision Making    Medical Decision Making:   Clinical Tests:   Lab Tests: Ordered and Reviewed  Radiological Study: Ordered and Reviewed  Medical Tests: Ordered and Reviewed           Scribe Attestation:   Scribe #1: I performed the above  scribed service and the documentation accurately describes the services I performed. I attest to the accuracy of the note.    Attending:   Physician Attestation Statement for Scribe #1: I, Attila Triana MD, personally performed the services described in this documentation, as scribed by Matthieu Quiroga, in my presence, and it is both accurate and complete.          Clinical Impression       ICD-10-CM ICD-9-CM   1. Lactic acidosis E87.2 276.2   2. Altered mental status R41.82 780.97   3. Anemia, unspecified type D64.9 285.9   4. Symptomatic anemia D64.9 285.9   5. Parkinson disease G20 332.0   6. Other seizures G40.89 780.39   7. Hyperlipidemia, mixed E78.2 272.2   8. Essential hypertension I10 401.9       Disposition:   Disposition: Admitted  Condition: Fair         Attila Triana MD  03/04/20 7509

## 2020-03-04 NOTE — HPI
Adebayo Oneil is a 69 year old male with Parkinson disease, hypertension and anxiety who resides at the [facility] and presented to the ED with reports of altered mentation upon standing this morning. The patient complains of right shoulder pain, but is unable to provide any additional history due to a baseline of disorientation. Upon review of his records sent by the nursing [place], the patient is currently receiving Augmentin for a UTI and he had a BMP of 1/15/20 which showed a BUN of 25, creatinine of 0.88. In Care Everywhere, on 9/15/19, he had a hemoglobin of 12.8, BUN of 31 and creatinine of 1.52. Today, he was found to have a hemoglobin of 5.5, creatinine of 0.9 and BUN of 72, total protein of 5, albumin of 2.3 and lactic acid of 2.4. His code status is noted to be DNR and Danni Howell is his power of .

## 2020-03-04 NOTE — CONSULTS
[facility] -   Gastroenterology  Consult Note    Patient Name: Adebayo Oneil  MRN: [id number]  Admission Date: 3/4/2020  Hospital Length of Stay: 0 days  Code Status: DNR   Attending Provider: Attila Triana MD   Consulting Provider: Elie Cooley PA-C  Primary Care Physician: [facility]  Principal Problem:Upper GI bleed    Inpatient consult to Gastroenterology  Consult performed by: Elie Cooley PA-C  Consult ordered by: CRYSTAL Jane  Reason for consult: GI bleed        Subjective:     HPI:  The patient presented to the ER for hypotension and AMS. The patient isn't trying to communicate with me. His history has been obtained from the medical record and medical staff. In the ER, the patient was noted to have a Hgb of 5.5. He has gotten 1 or 2 units of blood. Vitals have stabilized. Stool witnessed by staff here was reportedly without hematochezia or melena. BUN 72 and creatinine 0.9. There are no NSAIDs, anticoagulants or GI meds on his med list. We don't know if he has ever had an EGD or colonoscopy.     Past Medical History:   Diagnosis Date    Abdominal aortic aneurysm     Acute bronchospasm     Anxiety disorder due to known physiological condition     Atherosclerosis     Candidiasis of skin and nail     Chronic constipation     Conduct disorder, unspecified     Deficiency of other specified B group vitamins     Dehydration     Essential hypertension     Extended spectrum beta lactamase (ESBL) resistance     Generalized anxiety disorder     GERD with esophagitis     Hyperlipidemia, mixed     Other seasonal allergic rhinitis     Other seizures     Parkinson disease     Seizure     Ulcerative colitis     Vitamin D deficiency        No past surgical history on file.    Review of patient's allergies indicates:  No Known Allergies  Family History     None        Tobacco Use    Smoking status: Not on file   Substance and Sexual Activity    Alcohol use: Not on  file    Drug use: Not on file    Sexual activity: Not on file     Review of Systems   Unable to perform ROS: Mental status change     Objective:     Vital Signs (Most Recent):  Temp: 97.5 °F (36.4 °C) (03/04/20 1426)  Pulse: 80 (03/04/20 1426)  Resp: 18 (03/04/20 1426)  BP: 103/66 (03/04/20 1426)  SpO2: 100 % (03/04/20 1426) Vital Signs (24h Range):  Temp:  [96.8 °F (36 °C)-97.7 °F (36.5 °C)] 97.5 °F (36.4 °C)  Pulse:  [68-86] 80  Resp:  [11-23] 18  SpO2:  [98 %-100 %] 100 %  BP: ()/(51-76) 103/66     Weight: 66.6 kg (146 lb 13.2 oz) (03/04/20 0733)  There is no height or weight on file to calculate BMI.      Intake/Output Summary (Last 24 hours) at 3/4/2020 1440  Last data filed at 3/4/2020 1414  Gross per 24 hour   Intake 1700 ml   Output --   Net 1700 ml       Lines/Drains/Airways     Peripheral Intravenous Line                 Peripheral IV - Single Lumen 03/04/20 0732 18 G Left Antecubital less than 1 day         Peripheral IV - Single Lumen 03/04/20 0749 20 G Right Forearm less than 1 day                Physical Exam   Constitutional: No distress.   Thin male.    HENT:   Head: Normocephalic and atraumatic.   Eyes: EOM are normal.   Neck: Neck supple.   Cardiovascular: Normal rate, regular rhythm and normal heart sounds.   No murmur heard.  Pulmonary/Chest: Effort normal and breath sounds normal. No respiratory distress. He has no wheezes.   Abdominal: Soft. Bowel sounds are normal. He exhibits no distension and no mass. There is no hepatomegaly. There is no tenderness.   Musculoskeletal: He exhibits no edema.   Neurological: Gait normal.   Resting comfortably. Doesn't open his eyes for me.    Skin: Skin is warm and dry. No rash noted.       Significant Labs:  CBC:   Recent Labs   Lab 03/04/20  0746 03/04/20  1015 03/04/20  1355   WBC 10.86  --  8.26   HGB 5.5* 5.3* 6.6*   HCT 19.1* 18.2* 21.7*     --  205     CMP:   Recent Labs   Lab 03/04/20  0746      CALCIUM 7.7*   ALBUMIN 2.3*   PROT  5.0*      K 4.9   CO2 21*   *   BUN 72*   CREATININE 0.9   ALKPHOS 86   ALT 14   AST 16   BILITOT 0.1     Coagulation:   Recent Labs   Lab 03/04/20  0746   INR 1.0   APTT 24.2       Significant Imaging:  Imaging results within the past 24 hours have been reviewed.    Assessment/Plan:     Parkinson disease  Management per  team.     Symptomatic anemia  Normocytic anemia, etiology unknown. No overt bleeding reported.   BUN elevated, so upper GI bleed could be considered. Agree with IV Protonix.   May consider EGD tomorrow depending on response to blood transfusion. Continue to monitor H/H.           Thank you for your consult. I will follow-up with patient. Please contact us if you have any additional questions.    Elie Cooley PA-C  Gastroenterology  [facility]

## 2020-03-04 NOTE — SUBJECTIVE & OBJECTIVE
Past Medical History:   Diagnosis Date    Abdominal aortic aneurysm     Acute bronchospasm     Anxiety disorder due to known physiological condition     Atherosclerosis     Candidiasis of skin and nail     Chronic constipation     Conduct disorder, unspecified     Deficiency of other specified B group vitamins     Dehydration     Essential hypertension     Extended spectrum beta lactamase (ESBL) resistance     Generalized anxiety disorder     GERD with esophagitis     Hyperlipidemia, mixed     Other seasonal allergic rhinitis     Other seizures     Parkinson disease     Seizure     Ulcerative colitis     Vitamin D deficiency        No past surgical history on file.    Review of patient's allergies indicates:  No Known Allergies  Family History     None        Tobacco Use    Smoking status: Not on file   Substance and Sexual Activity    Alcohol use: Not on file    Drug use: Not on file    Sexual activity: Not on file     Review of Systems   Unable to perform ROS: Mental status change     Objective:     Vital Signs (Most Recent):  Temp: 97.5 °F (36.4 °C) (03/04/20 1426)  Pulse: 80 (03/04/20 1426)  Resp: 18 (03/04/20 1426)  BP: 103/66 (03/04/20 1426)  SpO2: 100 % (03/04/20 1426) Vital Signs (24h Range):  Temp:  [96.8 °F (36 °C)-97.7 °F (36.5 °C)] 97.5 °F (36.4 °C)  Pulse:  [68-86] 80  Resp:  [11-23] 18  SpO2:  [98 %-100 %] 100 %  BP: ()/(51-76) 103/66     Weight: 66.6 kg (146 lb 13.2 oz) (03/04/20 0733)  There is no height or weight on file to calculate BMI.      Intake/Output Summary (Last 24 hours) at 3/4/2020 1440  Last data filed at 3/4/2020 1414  Gross per 24 hour   Intake 1700 ml   Output --   Net 1700 ml       Lines/Drains/Airways     Peripheral Intravenous Line                 Peripheral IV - Single Lumen 03/04/20 0732 18 G Left Antecubital less than 1 day         Peripheral IV - Single Lumen 03/04/20 0749 20 G Right Forearm less than 1 day                Physical Exam    Constitutional: No distress.   Thin male.    HENT:   Head: Normocephalic and atraumatic.   Eyes: EOM are normal.   Neck: Neck supple.   Cardiovascular: Normal rate, regular rhythm and normal heart sounds.   No murmur heard.  Pulmonary/Chest: Effort normal and breath sounds normal. No respiratory distress. He has no wheezes.   Abdominal: Soft. Bowel sounds are normal. He exhibits no distension and no mass. There is no hepatomegaly. There is no tenderness.   Musculoskeletal: He exhibits no edema.   Neurological: Gait normal.   Resting comfortably. Doesn't open his eyes for me.    Skin: Skin is warm and dry. No rash noted.       Significant Labs:  CBC:   Recent Labs   Lab 03/04/20  0746 03/04/20  1015 03/04/20  1355   WBC 10.86  --  8.26   HGB 5.5* 5.3* 6.6*   HCT 19.1* 18.2* 21.7*     --  205     CMP:   Recent Labs   Lab 03/04/20  0746      CALCIUM 7.7*   ALBUMIN 2.3*   PROT 5.0*      K 4.9   CO2 21*   *   BUN 72*   CREATININE 0.9   ALKPHOS 86   ALT 14   AST 16   BILITOT 0.1     Coagulation:   Recent Labs   Lab 03/04/20  0746   INR 1.0   APTT 24.2       Significant Imaging:  Imaging results within the past 24 hours have been reviewed.

## 2020-03-04 NOTE — H&P (VIEW-ONLY)
[facility] -   Gastroenterology  Consult Note    Patient Name: Adebayo Oneil  MRN: [id number]  Admission Date: 3/4/2020  Hospital Length of Stay: 0 days  Code Status: DNR   Attending Provider: Attila Triana MD   Consulting Provider: Elie Cooley PA-C  Primary Care Physician: Provider Notinsystem  Principal Problem:Upper GI bleed    Inpatient consult to Gastroenterology  Consult performed by: Elie Cooley PA-C  Consult ordered by: CRYSTAL Jane  Reason for consult: GI bleed        Subjective:     HPI:  The patient presented to the ER for hypotension and AMS. The patient isn't trying to communicate with me. His history has been obtained from the medical record and medical staff. In the ER, the patient was noted to have a Hgb of 5.5. He has gotten 1 or 2 units of blood. Vitals have stabilized. Stool witnessed by staff here was reportedly without hematochezia or melena. BUN 72 and creatinine 0.9. There are no NSAIDs, anticoagulants or GI meds on his med list. We don't know if he has ever had an EGD or colonoscopy.     Past Medical History:   Diagnosis Date    Abdominal aortic aneurysm     Acute bronchospasm     Anxiety disorder due to known physiological condition     Atherosclerosis     Candidiasis of skin and nail     Chronic constipation     Conduct disorder, unspecified     Deficiency of other specified B group vitamins     Dehydration     Essential hypertension     Extended spectrum beta lactamase (ESBL) resistance     Generalized anxiety disorder     GERD with esophagitis     Hyperlipidemia, mixed     Other seasonal allergic rhinitis     Other seizures     Parkinson disease     Seizure     Ulcerative colitis     Vitamin D deficiency        No past surgical history on file.    Review of patient's allergies indicates:  No Known Allergies  Family History     None        Tobacco Use    Smoking status: Not on file   Substance and Sexual Activity    Alcohol use: Not on  file    Drug use: Not on file    Sexual activity: Not on file     Review of Systems   Unable to perform ROS: Mental status change     Objective:     Vital Signs (Most Recent):  Temp: 97.5 °F (36.4 °C) (03/04/20 1426)  Pulse: 80 (03/04/20 1426)  Resp: 18 (03/04/20 1426)  BP: 103/66 (03/04/20 1426)  SpO2: 100 % (03/04/20 1426) Vital Signs (24h Range):  Temp:  [96.8 °F (36 °C)-97.7 °F (36.5 °C)] 97.5 °F (36.4 °C)  Pulse:  [68-86] 80  Resp:  [11-23] 18  SpO2:  [98 %-100 %] 100 %  BP: ()/(51-76) 103/66     Weight: 66.6 kg (146 lb 13.2 oz) (03/04/20 0733)  There is no height or weight on file to calculate BMI.      Intake/Output Summary (Last 24 hours) at 3/4/2020 1440  Last data filed at 3/4/2020 1414  Gross per 24 hour   Intake 1700 ml   Output --   Net 1700 ml       Lines/Drains/Airways     Peripheral Intravenous Line                 Peripheral IV - Single Lumen 03/04/20 0732 18 G Left Antecubital less than 1 day         Peripheral IV - Single Lumen 03/04/20 0749 20 G Right Forearm less than 1 day                Physical Exam   Constitutional: No distress.   Thin male.    HENT:   Head: Normocephalic and atraumatic.   Eyes: EOM are normal.   Neck: Neck supple.   Cardiovascular: Normal rate, regular rhythm and normal heart sounds.   No murmur heard.  Pulmonary/Chest: Effort normal and breath sounds normal. No respiratory distress. He has no wheezes.   Abdominal: Soft. Bowel sounds are normal. He exhibits no distension and no mass. There is no hepatomegaly. There is no tenderness.   Musculoskeletal: He exhibits no edema.   Neurological: Gait normal.   Resting comfortably. Doesn't open his eyes for me.    Skin: Skin is warm and dry. No rash noted.       Significant Labs:  CBC:   Recent Labs   Lab 03/04/20  0746 03/04/20  1015 03/04/20  1355   WBC 10.86  --  8.26   HGB 5.5* 5.3* 6.6*   HCT 19.1* 18.2* 21.7*     --  205     CMP:   Recent Labs   Lab 03/04/20  0746      CALCIUM 7.7*   ALBUMIN 2.3*   PROT  5.0*      K 4.9   CO2 21*   *   BUN 72*   CREATININE 0.9   ALKPHOS 86   ALT 14   AST 16   BILITOT 0.1     Coagulation:   Recent Labs   Lab 03/04/20  0746   INR 1.0   APTT 24.2       Significant Imaging:  Imaging results within the past 24 hours have been reviewed.    Assessment/Plan:     Parkinson disease  Management per  team.     Symptomatic anemia  Normocytic anemia, etiology unknown. No overt bleeding reported.   BUN elevated, so upper GI bleed could be considered. Agree with IV Protonix.   May consider EGD tomorrow depending on response to blood transfusion. Continue to monitor H/H.           Thank you for your consult. I will follow-up with patient. Please contact us if you have any additional questions.    Elie Cooley PA-C  Gastroenterology  [facility]

## 2020-03-04 NOTE — ED NOTES
Blood bank called to notify that ordered PRBC units are ready. Will hold until repeat H&H is resulted to confirm labs.

## 2020-03-04 NOTE — ASSESSMENT & PLAN NOTE
Normocytic anemia, etiology unknown. No overt bleeding reported.   BUN elevated, so upper GI bleed could be considered. Agree with IV Protonix.   May consider EGD tomorrow depending on response to blood transfusion. Continue to monitor H/H.

## 2020-03-04 NOTE — HPI
The patient presented to the ER for hypotension and AMS. The patient isn't trying to communicate with me. His history has been obtained from the medical record and medical staff. In the ER, the patient was noted to have a Hgb of 5.5. He has gotten 1 or 2 units of blood. Vitals have stabilized. Stool witnessed by staff here was reportedly without hematochezia or melena. BUN 72 and creatinine 0.9. There are no NSAIDs, anticoagulants or GI meds on his med list. We don't know if he has ever had an EGD or colonoscopy.

## 2020-03-05 ENCOUNTER — ANESTHESIA EVENT (OUTPATIENT)
Dept: ENDOSCOPY | Facility: HOSPITAL | Age: 70
DRG: 378 | End: 2020-03-05
Payer: MEDICARE

## 2020-03-05 ENCOUNTER — ANESTHESIA (OUTPATIENT)
Dept: ENDOSCOPY | Facility: HOSPITAL | Age: 70
DRG: 378 | End: 2020-03-05
Payer: MEDICARE

## 2020-03-05 LAB
ANION GAP SERPL CALC-SCNC: 9 MMOL/L (ref 8–16)
BASOPHILS # BLD AUTO: 0.02 K/UL (ref 0–0.2)
BASOPHILS # BLD AUTO: 0.03 K/UL (ref 0–0.2)
BASOPHILS NFR BLD: 0.1 % (ref 0–1.9)
BASOPHILS NFR BLD: 0.2 % (ref 0–1.9)
BLD PROD TYP BPU: NORMAL
BLD PROD TYP BPU: NORMAL
BLOOD UNIT EXPIRATION DATE: NORMAL
BLOOD UNIT EXPIRATION DATE: NORMAL
BLOOD UNIT TYPE CODE: 6200
BLOOD UNIT TYPE CODE: 6200
BLOOD UNIT TYPE: NORMAL
BLOOD UNIT TYPE: NORMAL
BUN SERPL-MCNC: 60 MG/DL (ref 8–23)
CALCIUM SERPL-MCNC: 7.3 MG/DL (ref 8.7–10.5)
CHLORIDE SERPL-SCNC: 116 MMOL/L (ref 95–110)
CO2 SERPL-SCNC: 22 MMOL/L (ref 23–29)
CODING SYSTEM: NORMAL
CODING SYSTEM: NORMAL
CREAT SERPL-MCNC: 0.8 MG/DL (ref 0.5–1.4)
DIFFERENTIAL METHOD: ABNORMAL
DIFFERENTIAL METHOD: ABNORMAL
DISPENSE STATUS: NORMAL
DISPENSE STATUS: NORMAL
EOSINOPHIL # BLD AUTO: 0 K/UL (ref 0–0.5)
EOSINOPHIL # BLD AUTO: 0 K/UL (ref 0–0.5)
EOSINOPHIL NFR BLD: 0.1 % (ref 0–8)
EOSINOPHIL NFR BLD: 0.1 % (ref 0–8)
ERYTHROCYTE [DISTWIDTH] IN BLOOD BY AUTOMATED COUNT: 15.7 % (ref 11.5–14.5)
ERYTHROCYTE [DISTWIDTH] IN BLOOD BY AUTOMATED COUNT: 15.7 % (ref 11.5–14.5)
EST. GFR  (AFRICAN AMERICAN): >60 ML/MIN/1.73 M^2
EST. GFR  (NON AFRICAN AMERICAN): >60 ML/MIN/1.73 M^2
GLUCOSE SERPL-MCNC: 112 MG/DL (ref 70–110)
HCT VFR BLD AUTO: 22.9 % (ref 40–54)
HCT VFR BLD AUTO: 23.5 % (ref 40–54)
HGB BLD-MCNC: 7.3 G/DL (ref 14–18)
HGB BLD-MCNC: 7.6 G/DL (ref 14–18)
IMM GRANULOCYTES # BLD AUTO: 0.08 K/UL (ref 0–0.04)
IMM GRANULOCYTES # BLD AUTO: 0.08 K/UL (ref 0–0.04)
IMM GRANULOCYTES NFR BLD AUTO: 0.6 % (ref 0–0.5)
IMM GRANULOCYTES NFR BLD AUTO: 0.6 % (ref 0–0.5)
LYMPHOCYTES # BLD AUTO: 0.7 K/UL (ref 1–4.8)
LYMPHOCYTES # BLD AUTO: 0.7 K/UL (ref 1–4.8)
LYMPHOCYTES NFR BLD: 5.1 % (ref 18–48)
LYMPHOCYTES NFR BLD: 5.2 % (ref 18–48)
MAGNESIUM SERPL-MCNC: 1.7 MG/DL (ref 1.6–2.6)
MCH RBC QN AUTO: 27.5 PG (ref 27–31)
MCH RBC QN AUTO: 27.8 PG (ref 27–31)
MCHC RBC AUTO-ENTMCNC: 31.9 G/DL (ref 32–36)
MCHC RBC AUTO-ENTMCNC: 32.3 G/DL (ref 32–36)
MCV RBC AUTO: 86 FL (ref 82–98)
MCV RBC AUTO: 86 FL (ref 82–98)
MONOCYTES # BLD AUTO: 1.2 K/UL (ref 0.3–1)
MONOCYTES # BLD AUTO: 1.2 K/UL (ref 0.3–1)
MONOCYTES NFR BLD: 8.4 % (ref 4–15)
MONOCYTES NFR BLD: 8.9 % (ref 4–15)
NEUTROPHILS # BLD AUTO: 11.7 K/UL (ref 1.8–7.7)
NEUTROPHILS # BLD AUTO: 11.9 K/UL (ref 1.8–7.7)
NEUTROPHILS NFR BLD: 85 % (ref 38–73)
NEUTROPHILS NFR BLD: 85.7 % (ref 38–73)
NRBC BLD-RTO: 0 /100 WBC
NRBC BLD-RTO: 0 /100 WBC
NUM UNITS TRANS PACKED RBC: NORMAL
NUM UNITS TRANS PACKED RBC: NORMAL
PHOSPHATE SERPL-MCNC: 2.7 MG/DL (ref 2.7–4.5)
PLATELET # BLD AUTO: 194 K/UL (ref 150–350)
PLATELET # BLD AUTO: 199 K/UL (ref 150–350)
PMV BLD AUTO: 10.2 FL (ref 9.2–12.9)
PMV BLD AUTO: 10.4 FL (ref 9.2–12.9)
POTASSIUM SERPL-SCNC: 4.7 MMOL/L (ref 3.5–5.1)
RBC # BLD AUTO: 2.65 M/UL (ref 4.6–6.2)
RBC # BLD AUTO: 2.73 M/UL (ref 4.6–6.2)
SODIUM SERPL-SCNC: 147 MMOL/L (ref 136–145)
WBC # BLD AUTO: 13.78 K/UL (ref 3.9–12.7)
WBC # BLD AUTO: 13.89 K/UL (ref 3.9–12.7)

## 2020-03-05 PROCEDURE — 36415 COLL VENOUS BLD VENIPUNCTURE: CPT

## 2020-03-05 PROCEDURE — 63600175 PHARM REV CODE 636 W HCPCS: Performed by: NURSE ANESTHETIST, CERTIFIED REGISTERED

## 2020-03-05 PROCEDURE — 83735 ASSAY OF MAGNESIUM: CPT

## 2020-03-05 PROCEDURE — 43235 PR EGD, FLEX, DIAGNOSTIC: ICD-10-PCS | Mod: ,,, | Performed by: INTERNAL MEDICINE

## 2020-03-05 PROCEDURE — C9113 INJ PANTOPRAZOLE SODIUM, VIA: HCPCS | Performed by: PHYSICIAN ASSISTANT

## 2020-03-05 PROCEDURE — P9016 RBC LEUKOCYTES REDUCED: HCPCS

## 2020-03-05 PROCEDURE — 84100 ASSAY OF PHOSPHORUS: CPT

## 2020-03-05 PROCEDURE — 25000003 PHARM REV CODE 250: Performed by: PHYSICIAN ASSISTANT

## 2020-03-05 PROCEDURE — 63600175 PHARM REV CODE 636 W HCPCS: Performed by: PHYSICIAN ASSISTANT

## 2020-03-05 PROCEDURE — 21400001 HC TELEMETRY ROOM

## 2020-03-05 PROCEDURE — 43235 EGD DIAGNOSTIC BRUSH WASH: CPT | Mod: ,,, | Performed by: INTERNAL MEDICINE

## 2020-03-05 PROCEDURE — 43235 EGD DIAGNOSTIC BRUSH WASH: CPT | Performed by: INTERNAL MEDICINE

## 2020-03-05 PROCEDURE — 36430 TRANSFUSION BLD/BLD COMPNT: CPT

## 2020-03-05 PROCEDURE — 85025 COMPLETE CBC W/AUTO DIFF WBC: CPT

## 2020-03-05 PROCEDURE — 97802 MEDICAL NUTRITION INDIV IN: CPT

## 2020-03-05 PROCEDURE — 37000009 HC ANESTHESIA EA ADD 15 MINS: Performed by: INTERNAL MEDICINE

## 2020-03-05 PROCEDURE — 37000008 HC ANESTHESIA 1ST 15 MINUTES: Performed by: INTERNAL MEDICINE

## 2020-03-05 PROCEDURE — 80048 BASIC METABOLIC PNL TOTAL CA: CPT

## 2020-03-05 RX ORDER — PHENYLEPHRINE HYDROCHLORIDE 10 MG/ML
INJECTION INTRAVENOUS
Status: DISCONTINUED | OUTPATIENT
Start: 2020-03-05 | End: 2020-03-05

## 2020-03-05 RX ORDER — HYDROCODONE BITARTRATE AND ACETAMINOPHEN 500; 5 MG/1; MG/1
TABLET ORAL
Status: DISCONTINUED | OUTPATIENT
Start: 2020-03-05 | End: 2020-03-06 | Stop reason: HOSPADM

## 2020-03-05 RX ORDER — SODIUM CHLORIDE 0.9 % (FLUSH) 0.9 %
10 SYRINGE (ML) INJECTION
Status: DISCONTINUED | OUTPATIENT
Start: 2020-03-05 | End: 2020-03-06 | Stop reason: HOSPADM

## 2020-03-05 RX ORDER — LIDOCAINE HCL/PF 100 MG/5ML
SYRINGE (ML) INTRAVENOUS
Status: DISCONTINUED | OUTPATIENT
Start: 2020-03-05 | End: 2020-03-05

## 2020-03-05 RX ORDER — PROPOFOL 10 MG/ML
INJECTION, EMULSION INTRAVENOUS
Status: DISCONTINUED | OUTPATIENT
Start: 2020-03-05 | End: 2020-03-05

## 2020-03-05 RX ADMIN — CARBIDOPA AND LEVODOPA 1 TABLET: 25; 100 TABLET ORAL at 10:03

## 2020-03-05 RX ADMIN — PROPOFOL 30 MG: 10 INJECTION, EMULSION INTRAVENOUS at 03:03

## 2020-03-05 RX ADMIN — SIMVASTATIN 20 MG: 20 TABLET, FILM COATED ORAL at 10:03

## 2020-03-05 RX ADMIN — PROPOFOL 20 MG: 10 INJECTION, EMULSION INTRAVENOUS at 03:03

## 2020-03-05 RX ADMIN — PANTOPRAZOLE SODIUM 40 MG: 40 INJECTION, POWDER, LYOPHILIZED, FOR SOLUTION INTRAVENOUS at 10:03

## 2020-03-05 RX ADMIN — LIDOCAINE HYDROCHLORIDE 50 MG: 20 INJECTION, SOLUTION INTRAVENOUS at 03:03

## 2020-03-05 RX ADMIN — LEVETIRACETAM 500 MG: 100 SOLUTION ORAL at 10:03

## 2020-03-05 RX ADMIN — PROPOFOL 20 MG: 10 INJECTION, EMULSION INTRAVENOUS at 04:03

## 2020-03-05 RX ADMIN — PHENYLEPHRINE HYDROCHLORIDE 100 MCG: 10 INJECTION INTRAVENOUS at 04:03

## 2020-03-05 NOTE — DISCHARGE INSTRUCTIONS
Understanding Gastric Ulcers    A gastric ulcer is an open sore in the stomach lining. It is sometimes called a peptic ulcer. This is a more general term for ulcers that may be in the stomach or the upper part of the small intestine. Ulcers can cause pain. But they may also have no symptoms for a long time.  What causes gastric ulcers?  Gastric ulcers have a few common causes. To find the cause of your ulcer, your healthcare provider will give you an exam and take your health history. He or she may also order some tests. The main causes of gastric ulcers include:  · Infection with the H. pylori (Helicobacter pylori) bacteria. This damages the stomach lining. Digestive juices can then harm the digestive tract.  · Long-term use of some over-the-counter pain medicines. This reduces the bodys ability to protect the stomach from damage.  Other causes of gastric ulcers include:  · Heavy alcohol use  · Having a family history of ulcers  · In rare cases, a tumor in the digestive tract may cause an ulcer  Symptoms of a gastric ulcer  Ulcer symptoms may appear and then go away for a time. Symptoms of a gastric ulcer may include:  · Stomach pain, often a dull or burning feeling toward the top of your belly  · Feeling full or bloated  · Heartburn or acid reflux  · Upset stomach (nausea) or vomiting  · Vomiting blood  · Lack of appetite  · Weight loss  · Black stool  · Red blood in the stool  Treatment for a gastric ulcer  Treatment for gastric ulcers may depend on what is causing them. Treatment may include:  · Not using over-the-counter medicines. You will likely need to stop taking these medicines. But in some cases these medicines cant be safely stopped. Check with your healthcare provider to see what is best for you.   · Taking medicines to ease symptoms. These medicines may help to reduce the amount of acid your stomach makes. They also may help coat your stomach lining.  · Taking antibiotics. If your ulcer was caused  by H. pylori, your provider will likely prescribe antibiotics to get rid of the infection.  · Having an endoscopy. This is often done to check the stomach and diagnose the ulcer. In some cases it can also treat the ulcer. It involves passing a flexible tube through your mouth into your stomach and small intestine.  · Using a tube (catheter) to stop the bleeding. A thin tube is passed into one of your blood vessels. Special tools are used to help stop the bleeding.  · Having surgery. You often may need this if the ulcer has caused severe symptoms.  Making some lifestyle changes can reduce ulcer symptoms. It may also prevent more damage to your digestive tract. These changes include:  · Not taking over-the-counter pain medicines. Talk with your provider about using another type of pain reliever.  · Not taking aspirin unless your provider has advised it  · Limiting the amount of alcohol you drink  · Quitting smoking  Possible complications of a gastric ulcer  Gastric ulcers can have serious complications. These can include:  · Bleeding into the stomach  · A hole (perforation) in the stomach  · A blockage that interferes with food moving from your stomach to the small intestine  An ongoing infection with H. pylori may be a risk factor for stomach cancer. This is one reason it is important to get rid of this bacteria.  When to call your healthcare provider  Call your healthcare provider right away if you have any of these:  · Vomiting blood, or vomit that looks like coffee grounds  · Bloody, black, or tarry-looking stools  · Fever of 100.4°F (38°C) or higher, or as directed  · Pain that gets worse  · Symptoms that dont get better with treatment, or symptoms that get worse  · New symptoms   Date Last Reviewed: 5/1/2016  © 2617-8260 [employer]. [address] [place] [address], [place] [address]. All rights reserved. This information is not intended as a substitute for professional medical care. Always follow your  healthcare professional's instructions.

## 2020-03-05 NOTE — NURSING
Notified by PCT that BP was 64/48, retook and got reading of 84/51. JUVENAL Roberts notified and an additional unit of blood ordered. Will continue to monitor.

## 2020-03-05 NOTE — PROGRESS NOTES
[facility] Hospital Medicine  Progress Note    Patient Name: Adebayo Oneil  MRN: [id number]  Patient Class: IP- Inpatient   Admission Date: 3/4/2020  Length of Stay: 1 days  Attending Physician: Maria Guadalupe Stout MD  Primary Care Provider: Provider Notinsystem        Subjective:     Principal Problem:Upper GI bleed        HPI:  Adebayo Oneil is a 69 year old male with Parkinson disease, hypertension and anxiety who resides at the [facility] and presented to the ED with reports of altered mentation upon standing this morning. The patient complains of right shoulder pain, but is unable to provide any additional history due to a baseline of disorientation. Upon review of his records sent by the nursing home, the patient is currently receiving Augmentin for a UTI and he had a BMP of 1/15/20 which showed a BUN of 25, creatinine of 0.88. In Care Everywhere, on 9/15/19, he had a hemoglobin of 12.8, BUN of 31 and creatinine of 1.52. Today, he was found to have a hemoglobin of 5.5, creatinine of 0.9 and BUN of 72, total protein of 5, albumin of 2.3 and lactic acid of 2.4. His code status is noted to be DNR and Danni Howell is his power of .     Overview/Hospital Course:  Mr Oneil is a 69 year old male who presented to [facility] with altered mentation with standing yesterday morning. History of Parkinson disease, he was noted to have HVB of 5.5. He was transfuse 2 unit of PRBC on yesterday. GI consulted, plan for EGD on today. Transferred one unit of PRBC this AM as well. He is currently non verbal. Mild left hand  to command, no movement of right upper or lower. GI plan EGD today and recommend one unit of PRBG today.     Interval History: Pt seen and examined, no distress, EGD today.     Review of Systems   Unable to perform ROS: Patient nonverbal     Objective:     Vital Signs (Most Recent):  Temp: 96.8 °F (36 °C) (03/05/20 1125)  Pulse: 86 (03/05/20 1125)  Resp: 12 (03/05/20  1125)  BP: (!) 93/58 (03/05/20 1125)  SpO2: 98 % (03/05/20 1125) Vital Signs (24h Range):  Temp:  [96.3 °F (35.7 °C)-98.6 °F (37 °C)] 96.8 °F (36 °C)  Pulse:  [76-92] 86  Resp:  [12-20] 12  SpO2:  [95 %-100 %] 98 %  BP: ()/(48-74) 93/58     Weight: 67.4 kg (148 lb 9.4 oz)  There is no height or weight on file to calculate BMI.    Intake/Output Summary (Last 24 hours) at 3/5/2020 1401  Last data filed at 3/5/2020 0337  Gross per 24 hour   Intake 1120.83 ml   Output --   Net 1120.83 ml      Physical Exam   Constitutional: He is oriented to person, place, and time. No distress.   Eyes: Right eye exhibits no discharge. Left eye exhibits no discharge.   Neck: No JVD present.   Cardiovascular: Exam reveals no gallop and no friction rub.   No murmur heard.  Pulmonary/Chest: No stridor. No respiratory distress. He has no wheezes. He has no rales. He exhibits no tenderness.   Abdominal: He exhibits no distension and no mass. There is no tenderness. There is no rebound and no guarding. No hernia.   Musculoskeletal: Normal range of motion.   Neurological: He is alert and oriented to person, place, and time.   Skin: Skin is warm and dry. He is not diaphoretic.       Significant Labs:   CBC:   Recent Labs   Lab 03/04/20  1906 03/05/20  0507 03/05/20  0828   WBC 8.65 13.78* 13.89*   HGB 7.9* 7.6* 7.3*   HCT 25.1* 23.5* 22.9*    194 199     CMP:   Recent Labs   Lab 03/04/20  0746 03/05/20  0507    147*   K 4.9 4.7   * 116*   CO2 21* 22*    112*   BUN 72* 60*   CREATININE 0.9 0.8   CALCIUM 7.7* 7.3*   PROT 5.0*  --    ALBUMIN 2.3*  --    BILITOT 0.1  --    ALKPHOS 86  --    AST 16  --    ALT 14  --    ANIONGAP 9 9   EGFRNONAA >60 >60     Coagulation:   Recent Labs   Lab 03/04/20  0746   INR 1.0   APTT 24.2       Significant Imaging:     Imaging Results          X-ray Shoulder 2 or More Views Right (Final result)  Result time 03/04/20 16:13:30    Final result by Eusebio Abdul MD (03/04/20 16:13:30)                  Impression:      No acute fracture or dislocation.      Electronically signed by: Eusebio Abdul MD  Date:    03/04/2020  Time:    16:13             Narrative:    EXAMINATION:  XR SHOULDER COMPLETE 2 OR MORE VIEWS RIGHT    CLINICAL HISTORY:  XR SHOULDER COMPLETE 2 OR MORE VIEWS RIGHT    COMPARISON:  None    FINDINGS:  Three views of the right shoulder were obtained.    No evidence of acute fracture or dislocation.  Diffuse osteopenia.  Advanced degenerative changes of the AC joint and glenohumeral joint.  Soft tissues are unremarkable.   Colonic interposition suspected beneath the right hemidiaphragm.  Atelectasis right lung base suspected.                               X-Ray Chest AP Portable (Final result)  Result time 03/04/20 09:29:53    Final result by Luiz Kinney MD (03/04/20 09:29:53)                 Impression:      Significant elevation of the right hemidiaphragm with likely adjacent atelectasis, otherwise no acute radiographic abnormality in the chest.      Electronically signed by: Luiz Kinney  Date:    03/04/2020  Time:    09:29             Narrative:    EXAMINATION:  XR CHEST AP PORTABLE    CLINICAL HISTORY:  AMS;    TECHNIQUE:  Single frontal view of the chest was performed.    COMPARISON:  None    FINDINGS:  Cardiac leads project over the chest.  Significant elevation of the right hemidiaphragm with likely adjacent atelectasis.  No large consolidation or effusion.  No pneumothorax.  Visualized osseous structures appear intact.  Degenerative changes of both shoulders.                               CT Head Without Contrast (Final result)  Result time 03/04/20 08:27:09    Final result by Luiz Kinney MD (03/04/20 08:27:09)                 Impression:      No acute intracranial infarct or hemorrhage.    Prominence of the ventricular system suspicious for normal pressure hydrocephalus.    All CT scans at this facility use dose modulation, iterative reconstruction, and/or weight  base dosing when appropriate to reduce radiation dose to as low as reasonably achievable.      Electronically signed by: Luiz Kinney  Date:    03/04/2020  Time:    08:27             Narrative:    EXAMINATION:  CT HEAD WITHOUT CONTRAST    CLINICAL HISTORY:  Confusion/delirium, altered LOC, unexplained;    TECHNIQUE:  Contiguous axial images were obtained from the skull base through the vertex without intravenous contrast.    COMPARISON:  None    FINDINGS:  No intracranial hemorrhage. No mass effect or midline shift. No abnormal parenchymal hypoattenuation to suggest acute or recent major vascular territory cerebral infarct.  Mild prominence of the ventricular system with crowding of the sulci at the vertex and upward bowing of the corpus callosum concerning for normal pressure hydrocephalus.  The paranasal sinuses and mastoid air cells are clear.  No concerning osseous findings.                                Assessment/Plan:      * Upper GI bleed  -IV Protonix.   -Transfuse 2 units PRBCs.   -Monitor hemoglobin and hematocrit.   -GI consult.     3/5  GI following   Received 2 units of PRBC's yesterday  HBG 7.3, transfusied on unit of PRBC    Essential hypertension  -Hold Hytrin due to GI bleeding.   -Hydralazine as needed.       Hyperlipidemia, mixed  Continue statin.       Other seizures  Continue Keppra.       Parkinson disease  -Stable.   -Continue Sinemet.       Lactic acidosis  -Likely due to volume loss associated with GI bleeding.       Symptomatic anemia  -Transfuse 2 units PRBCs.   -Monitor hemoglobin and hematocrit.     3/5  Patient receivied 2 unit PRBC on yestered and one unit today.  GI following, plan EGD today         VTE Risk Mitigation (From admission, onward)         Ordered     Place sequential compression device  Until discontinued      03/04/20 1122     IP VTE HIGH RISK PATIENT  Once      03/04/20 1122                      Maxi Haskins NP  Department of Hospital Medicine   [facility]

## 2020-03-05 NOTE — PLAN OF CARE
Attempted to meet with pt at bedside for DC assessment. Pt out of room. Pt is a resident of [facility]. He can presumably return there at DC pending hospial course.  Jase Coleman LMSW 3/5/2020 3:59 PM

## 2020-03-05 NOTE — CONSULTS
[facility] -   Adult Nutrition  Consult Note    SUMMARY     Recommendations    Recommendation: 1. When medically able, ADAT to Cardiac diet. 2. If unable to adv diet to at least full liquid within the next 96 hrs, consider alternate nutrition support: Custom TPN 5/20 @ 80 ml/hr + IVFE daily w/ electrolyte management per pharmacy/MD (1690 kcal,  96 g; GIR: 3.9). 3. RD to f/u.    Goals: Meet > 85 % EEN/EPN by RD f/u   Nutrition Goal Status: new  Communication of RD Recs: (POc, sticky note)    Reason for Assessment    Reason For Assessment: consult  Dx:AMS, lactic acidosis, upper GI bleed  Relevant Medical History:  HTN, GERD, HLD   General Information Comments: Pt currently NPO. Per GI notes, no over-bleeding reported. EGD tomorrow. Pt disoriented/confused. No wt hx. Per NFPE 3/5/20, no muscle wasting/ fat depletion noted.   Nutrition Discharge Planning: pending medical course    Nutrition Risk Screen    Nutrition Risk Screen: no indicators present    Nutrition/Diet History    Spiritual, Cultural Beliefs, Latter-day Practices, Values that Affect Care: no    Anthropometrics    Temp: 96.8 °F (36 °C)  Weight Method: Bed Scale  Weight: 67.4 kg (148 lb 9.4 oz)  Weight (lb): 148.59 lb     No height recorded     Lab/Procedures/Meds    Pertinent Labs Reviewed: reviewed  BMP  Lab Results   Component Value Date     (H) 03/05/2020    K 4.7 03/05/2020     (H) 03/05/2020    CO2 22 (L) 03/05/2020    BUN 60 (H) 03/05/2020    CREATININE 0.8 03/05/2020    CALCIUM 7.3 (L) 03/05/2020    ANIONGAP 9 03/05/2020    ESTGFRAFRICA >60 03/05/2020    EGFRNONAA >60 03/05/2020     Lab Results   Component Value Date    CALCIUM 7.3 (L) 03/05/2020    PHOS 2.7 03/05/2020     Lab Results   Component Value Date    ALBUMIN 2.3 (L) 03/04/2020     No results for input(s): POCTGLUCOSE in the last 24 hours.    Pertinent Medications Reviewed: reviewed    Physical Findings/Assessment      skin: wound abrasion toe      Estimated/Assessed Needs    Weight Used For Calorie Calculations: 67.4 kg (148 lb 9.4 oz)  Energy Calorie Requirements (kcal): 1685 - 2022  Energy Need Method: Kcal/kg  Protein Requirements: 81 -101 g  Weight Used For Protein Calculations: 67.4 kg (148 lb 9.4 oz)     Estimated Fluid Requirement Method: RDA Method(or per MD)  RDA Method (mL): 1685         Nutrition Prescription Ordered    Current Diet Order: NPO    Evaluation of Received Nutrient/Fluid Intake       % Intake of Estimated Energy Needs: 0 - 25 %  % Meal Intake: NPO    Nutrition Risk      2xweekly    Assessment and Plan    Nutrition Problem  Inadequate energy intake     Related to (etiology):   Altered GI function     Signs and Symptoms (as evidenced by):   NPO    Interventions  Collaboration with other providers    Nutrition Diagnosis Status:   New        Monitor and Evaluation    Food and Nutrient Intake: energy intake, food and beverage intake  Food and Nutrient Adminstration: diet order  Anthropometric Measurements: weight  Biochemical Data, Medical Tests and Procedures: electrolyte and renal panel, glucose/endocrine profile  Nutrition-Focused Physical Findings: overall appearance     Malnutrition Assessment                   no muscle wasting/fat depletion noted                    Nutrition Follow-Up    RD Follow-up?: Yes

## 2020-03-05 NOTE — PROVATION PATIENT INSTRUCTIONS
Discharge Summary/Instructions after an Endoscopic Procedure  Patient Name: Adebayo Oneil  Patient MRN: [id number]  Patient YOB: 1950 Thursday, March 05, 2020 Dane Ashraf III, MD  RESTRICTIONS:  During your procedure today, you received medications for sedation.  These   medications may affect your judgment, balance and coordination.  Therefore,   for 24 hours, you have the following restrictions:   - DO NOT drive a car, operate machinery, make legal/financial decisions,   sign important papers or drink alcohol.    ACTIVITY:  Today: no heavy lifting, straining or running due to procedural   sedation/anesthesia.  The following day: return to full activity including work.  DIET:  Eat and drink normally unless instructed otherwise.     TREATMENT FOR COMMON SIDE EFFECTS:  - Mild abdominal pain, nausea, belching, bloating or excessive gas:  rest,   eat lightly and use a heating pad.  - Sore Throat: treat with throat lozenges and/or gargle with warm salt   water.  - Because air was used during the procedure, expelling large amounts of air   from your rectum or belching is normal.  - If a bowel prep was taken, you may not have a bowel movement for 1-3 days.    This is normal.  SYMPTOMS TO WATCH FOR AND REPORT TO YOUR PHYSICIAN:  1. Abdominal pain or bloating, other than gas cramps.  2. Chest pain.  3. Back pain.  4. Signs of infection such as: chills or fever occurring within 24 hours   after the procedure.  5. Rectal bleeding, which would show as bright red, maroon, or black stools.   (A tablespoon of blood from the rectum is not serious, especially if   hemorrhoids are present.)  6. Vomiting.  7. Weakness or dizziness.  GO DIRECTLY TO THE NEAREST EMERGENCY ROOM IF YOU HAVE ANY OF THE FOLLOWING:      Difficulty breathing              Chills and/or fever over 101 F   Persistent vomiting and/or vomiting blood   Severe abdominal pain   Severe chest pain   Black, tarry stools   Bleeding- more than one  tablespoon   Any other symptom or condition that you feel may need urgent attention  Your doctor recommends these additional instructions:  If any biopsies were taken, your doctors clinic will contact you in 1 to 2   weeks with any results.  - Return patient to hospital [facility] for ongoing care.   - Advance diet as tolerated and clear liquid diet.   - Continue present medications.   - Repeat upper endoscopy in 2 weeks to evaluate the response to therapy and   for retreatment.  For questions, problems or results please call your physician Dane Ashraf III, MD at Work:  [phone]  If you have any questions about the above instructions, call the GI   department at [phone] or call the endoscopy unit at [phone]   from 7am until 3 pm.  [facility], EMERGENCY ROOM PHONE NUMBER:   [phone]  IF A COMPLICATION OR EMERGENCY SITUATION ARISES AND YOU ARE UNABLE TO REACH   YOUR PHYSICIAN - GO DIRECTLY TO THE EMERGENCY ROOM.  I have read or have had read to me these discharge instructions for my   procedure and have received a written copy.  I understand these   instructions and will follow-up with my physician if I have any questions.     __________________________________       _____________________________________  Nurse Signature                                          Patient/Designated   Responsible Party Signature  Dane Ashraf III, MD  3/5/2020 4:17:11 PM  This report has been verified and signed electronically.  [employer]

## 2020-03-05 NOTE — ANESTHESIA PREPROCEDURE EVALUATION
03/05/2020  Adebayo Oneil is a 69 y.o., male.    Anesthesia Evaluation    I have reviewed the Patient Summary Reports.    I have reviewed the Nursing Notes.   I have reviewed the Medications.     Review of Systems  Social:  Former Smoker, No Alcohol Use    Hematology/Oncology:     Oncology Normal    -- Anemia:   EENT/Dental:EENT/Dental Normal   Cardiovascular:   Hypertension ECG has been reviewed.    Pulmonary:  Pulmonary Normal    Hepatic/GI:   PUD,    Neurological:   Seizures    Psych:   Psychiatric History          Physical Exam  General:  Well nourished    Airway/Jaw/Neck:  Airway Findings: Mouth Opening: Normal Tongue: Normal  Pre-Existing Airway Tube(s): Oral Endotracheal tube  General Airway Assessment: Adult  Mallampati: II  Improves to II with phonation.  TM Distance: Normal, at least 6 cm      Dental:  Dental Findings: EdentulousDenies anything loose or chipped        Mental Status:  Mental Status Findings:  Lethargic, Confusion         Anesthesia Plan  Type of Anesthesia, risks & benefits discussed:  Anesthesia Type:  general, MAC  Patient's Preference:   Intra-op Monitoring Plan: standard ASA monitors  Intra-op Monitoring Plan Comments:   Post Op Pain Control Plan:   Post Op Pain Control Plan Comments:   Induction:   IV  Beta Blocker:         Informed Consent: Patient understands risks and agrees with Anesthesia plan.  Questions answered.   ASA Score: 3     Day of Surgery Review of History & Physical: I have interviewed and examined the patient. I have reviewed the patient's H&P dated:  There are no significant changes.      Anesthesia Plan Notes: Adebayo Oneil is a 69 year old male with Parkinson disease, hypertension and anxiety who resides at the [facility] and presented to the ED with reports of altered mentation upon standing this morning. The patient complains of right  shoulder pain, but is unable to provide any additional history due to a baseline of disorientation. Upon review of his records sent by the nursing home, the patient is currently receiving Augmentin for a UTI and he had a BMP of 1/15/20 which showed a BUN of 25, creatinine of 0.88. In Care Everywhere, on 9/15/19, he had a hemoglobin of 12.8, BUN of 31 and creatinine of 1.52. Today, he was found to have a hemoglobin of 5.5, creatinine of 0.9 and BUN of 72, total protein of 5, albumin of 2.3 and lactic acid of 2.4. His code status is noted to be DNR and Danni Howell is his power of .            Ready For Surgery From Anesthesia Perspective.

## 2020-03-05 NOTE — PROGRESS NOTES
Pt admitted to floor. Alert and oriented x 1. Cooperative with care. Denies chest pain. States some sob. Lungs diminished. No sob noted. sr on monitor. No edema. Pulses palpable. scds applied.skin pale. Scraps to ble. Worse to rt, pt with small sore to rt great toe. No signs of infection. Bilateral heels with blanchable redness. Oral care given. Abdomen soft. Pt with  Small luzmaria colored stool with cleansing of urine. Tolerated. Barrier cream applied. Iv to lt and rt arm wnl. Sitter at bedside. Bed alarm on.

## 2020-03-05 NOTE — CONSULTS
"Consulted to this 69 year old male patient for BLE wounds. PMHx of Parkinson disease, hypertension and anxiety who resides at the [facility] and presented to the ED with reports of altered mentation upon standing this morning. He is admitted with upper GI bleed. Some excoriation noted to his right shin and right great toe, reportedly he's been picking and scratching. Areas of excoriation are scabbed over, no intervention required at this time. Patient turned to left side, sacrum, coccyx and bilateral buttock intact with no redness noted. Recommend continued pressure injury prevention measures.    Skin Care Precautions / Pressure Injury Prevention:  1. Follow "Guidelines for Prevention of Pressure Ulcers in At Risk Patients"  These guidelines can be found on the [facility] Intr[facility]et by searching "Wound Care / Ostomy Resources"  2. Document wound assessment in [facility] using guidelines in Jean's "Assessment : Wound" procedure  3. Limit the amount of linen/underpad between patient and mattress surface to ONE fitted sheet and ONE covidien underpad - NO DIAPERS  4. Obtain Bath Wipes for providing ok care - avoid the use of wash cloths to areas affected by IAD.  5. Apply Moisture Barrier Paste to perineal / perirectal areas in a thin even layer to clean dry skin BID and after each episode of pericare  6. Apply sween 24 moisturizer cream to all dry skin after daily bath and prn  7. Obtain foam wedge from materials management to assist with maintaining proper position changes at least q 2hours and document actual position in EPIC q 2hours  8. Elevate heels off mattress on 2 separate pillows placed lengthwise under each leg supporting the leg from knee to ankle.  Document in EPIC flow sheet every 2 hours.  9. .Do NOT elevate HOB greater than 30 degrees unless contraindicated.  10. Remove SCD/Plexi Pulses/KISHORE's every 12 hours for 30 minutes and assess skin underneath these devices for breakdown        "

## 2020-03-05 NOTE — ASSESSMENT & PLAN NOTE
-Transfuse 2 units PRBCs.   -Monitor hemoglobin and hematocrit.     3/5  Patient receivied 2 unit PRBC on yestered and one unit today.  GI following, plan EGD today

## 2020-03-05 NOTE — HOSPITAL COURSE
Mr Oneil is a 69 year old male who presented to [facility] with altered mentation upon standing morning the morning of admission. History of Parkinson disease, he was noted to have HGB of 5.5. Patient was transfused 2 unit of PRBC. GI consulted and patient underwent EGD that afternoon. HGB continued to to be on the low side he was transferred another unit of PRBC before procedure. EGD results showed one non-bleeding cratered gastric ulcer with adherent clot was found on the greater curvature of the gastric body. The next day H & H results remain stable, no sign of bleeding noted, vital signs and labs stable. Case reviewed with GI, ok to discharge for there standpoint. He will continue Protonix 40 mg PO BID, GI to arrange EGD in 2 weeks to evaluate response to therapy. Patient was seen, examined and deemed suitable for discharge.

## 2020-03-05 NOTE — SUBJECTIVE & OBJECTIVE
Interval History: ***    Review of Systems   Unable to perform ROS: Patient nonverbal     Objective:     Vital Signs (Most Recent):  Temp: 96.8 °F (36 °C) (03/05/20 1125)  Pulse: 86 (03/05/20 1125)  Resp: 12 (03/05/20 1125)  BP: (!) 93/58 (03/05/20 1125)  SpO2: 98 % (03/05/20 1125) Vital Signs (24h Range):  Temp:  [96.3 °F (35.7 °C)-98.6 °F (37 °C)] 96.8 °F (36 °C)  Pulse:  [76-92] 86  Resp:  [12-20] 12  SpO2:  [95 %-100 %] 98 %  BP: ()/(48-74) 93/58     Weight: 67.4 kg (148 lb 9.4 oz)  There is no height or weight on file to calculate BMI.    Intake/Output Summary (Last 24 hours) at 3/5/2020 1418  Last data filed at 3/5/2020 0337  Gross per 24 hour   Intake 770.83 ml   Output --   Net 770.83 ml      Physical Exam   Constitutional: No distress.   Eyes: Right eye exhibits no discharge. Left eye exhibits no discharge.   Neck: No JVD present.   Cardiovascular: Exam reveals no gallop and no friction rub.   No murmur heard.  Pulmonary/Chest: No stridor. No respiratory distress. He has no wheezes. He has no rales. He exhibits no tenderness.   Abdominal: He exhibits no distension. There is no tenderness.   Musculoskeletal: He exhibits no edema or deformity.   Skin: He is not diaphoretic.   Nursing note and vitals reviewed.      Significant Labs: {Results:94523}    Significant Imaging: {Imaging Review:65298}

## 2020-03-05 NOTE — PLAN OF CARE
Patient AAOX1, self. Vitals stable, no complaints of pain, PIV CDI. One unit of blood given during shift, tolerated well. Sitter at bedside. Will continue to monitor.

## 2020-03-05 NOTE — INTERVAL H&P NOTE
The patient has been examined and the H&P has been reviewed:I have reviewed this note and I agree with this assessment. The patient remains stable for endoscopy at the time of this present evaluation.         Anesthesia/Surgery risks, benefits and alternative options discussed and understood by patient/family.          Active Hospital Problems    Diagnosis  POA    *Upper GI bleed [K92.2]  Yes    Symptomatic anemia [D64.9]  Yes    Lactic acidosis [E87.2]  Yes    Parkinson disease [G20]  Yes    Other seizures [G40.89]  Yes    Hyperlipidemia, mixed [E78.2]  Yes    Essential hypertension [I10]  Yes      Resolved Hospital Problems   No resolved problems to display.

## 2020-03-05 NOTE — PLAN OF CARE
Recommendations     Recommendation: 1. When medically able, ADAT to Cardiac diet. 2. If unable to adv diet to at least full liquid within the next 96 hrs, consider alternate nutrition support: Custom TPN 5/20 @ 80 ml/hr + IVFE daily w/ electrolyte management per pharmacy/MD (1690 kcal,  96 g; GIR: 3.9). 3. RD to f/u.    Goals: Meet > 85 % EEN/EPN by RD f/u   Nutrition Goal Status: new  Communication of RD Recs: (POc, sticky note)

## 2020-03-05 NOTE — SUBJECTIVE & OBJECTIVE
Interval History: Pt seen and examined, no distress, EGD today.     Review of Systems   Unable to perform ROS: Patient nonverbal     Objective:     Vital Signs (Most Recent):  Temp: 96.8 °F (36 °C) (03/05/20 1125)  Pulse: 86 (03/05/20 1125)  Resp: 12 (03/05/20 1125)  BP: (!) 93/58 (03/05/20 1125)  SpO2: 98 % (03/05/20 1125) Vital Signs (24h Range):  Temp:  [96.3 °F (35.7 °C)-98.6 °F (37 °C)] 96.8 °F (36 °C)  Pulse:  [76-92] 86  Resp:  [12-20] 12  SpO2:  [95 %-100 %] 98 %  BP: ()/(48-74) 93/58     Weight: 67.4 kg (148 lb 9.4 oz)  There is no height or weight on file to calculate BMI.    Intake/Output Summary (Last 24 hours) at 3/5/2020 1401  Last data filed at 3/5/2020 0337  Gross per 24 hour   Intake 1120.83 ml   Output --   Net 1120.83 ml      Physical Exam   Constitutional: He is oriented to person, place, and time. No distress.   Eyes: Right eye exhibits no discharge. Left eye exhibits no discharge.   Neck: No JVD present.   Cardiovascular: Exam reveals no gallop and no friction rub.   No murmur heard.  Pulmonary/Chest: No stridor. No respiratory distress. He has no wheezes. He has no rales. He exhibits no tenderness.   Abdominal: He exhibits no distension and no mass. There is no tenderness. There is no rebound and no guarding. No hernia.   Musculoskeletal: Normal range of motion.   Neurological: He is alert and oriented to person, place, and time.   Skin: Skin is warm and dry. He is not diaphoretic.       Significant Labs:   CBC:   Recent Labs   Lab 03/04/20  1906 03/05/20  0507 03/05/20  0828   WBC 8.65 13.78* 13.89*   HGB 7.9* 7.6* 7.3*   HCT 25.1* 23.5* 22.9*    194 199     CMP:   Recent Labs   Lab 03/04/20  0746 03/05/20  0507    147*   K 4.9 4.7   * 116*   CO2 21* 22*    112*   BUN 72* 60*   CREATININE 0.9 0.8   CALCIUM 7.7* 7.3*   PROT 5.0*  --    ALBUMIN 2.3*  --    BILITOT 0.1  --    ALKPHOS 86  --    AST 16  --    ALT 14  --    ANIONGAP 9 9   EGFRNONAA >60 >60      Coagulation:   Recent Labs   Lab 03/04/20  0746   INR 1.0   APTT 24.2       Significant Imaging:     Imaging Results          X-ray Shoulder 2 or More Views Right (Final result)  Result time 03/04/20 16:13:30    Final result by Eusebio Abdul MD (03/04/20 16:13:30)                 Impression:      No acute fracture or dislocation.      Electronically signed by: Eusebio Abdul MD  Date:    03/04/2020  Time:    16:13             Narrative:    EXAMINATION:  XR SHOULDER COMPLETE 2 OR MORE VIEWS RIGHT    CLINICAL HISTORY:  XR SHOULDER COMPLETE 2 OR MORE VIEWS RIGHT    COMPARISON:  None    FINDINGS:  Three views of the right shoulder were obtained.    No evidence of acute fracture or dislocation.  Diffuse osteopenia.  Advanced degenerative changes of the AC joint and glenohumeral joint.  Soft tissues are unremarkable.   Colonic interposition suspected beneath the right hemidiaphragm.  Atelectasis right lung base suspected.                               X-Ray Chest AP Portable (Final result)  Result time 03/04/20 09:29:53    Final result by Luiz Kinney MD (03/04/20 09:29:53)                 Impression:      Significant elevation of the right hemidiaphragm with likely adjacent atelectasis, otherwise no acute radiographic abnormality in the chest.      Electronically signed by: Luiz Kinney  Date:    03/04/2020  Time:    09:29             Narrative:    EXAMINATION:  XR CHEST AP PORTABLE    CLINICAL HISTORY:  AMS;    TECHNIQUE:  Single frontal view of the chest was performed.    COMPARISON:  None    FINDINGS:  Cardiac leads project over the chest.  Significant elevation of the right hemidiaphragm with likely adjacent atelectasis.  No large consolidation or effusion.  No pneumothorax.  Visualized osseous structures appear intact.  Degenerative changes of both shoulders.                               CT Head Without Contrast (Final result)  Result time 03/04/20 08:27:09    Final result by Luiz Kinney MD  (03/04/20 08:27:09)                 Impression:      No acute intracranial infarct or hemorrhage.    Prominence of the ventricular system suspicious for normal pressure hydrocephalus.    All CT scans at this facility use dose modulation, iterative reconstruction, and/or weight base dosing when appropriate to reduce radiation dose to as low as reasonably achievable.      Electronically signed by: Luiz Kinney  Date:    03/04/2020  Time:    08:27             Narrative:    EXAMINATION:  CT HEAD WITHOUT CONTRAST    CLINICAL HISTORY:  Confusion/delirium, altered LOC, unexplained;    TECHNIQUE:  Contiguous axial images were obtained from the skull base through the vertex without intravenous contrast.    COMPARISON:  None    FINDINGS:  No intracranial hemorrhage. No mass effect or midline shift. No abnormal parenchymal hypoattenuation to suggest acute or recent major vascular territory cerebral infarct.  Mild prominence of the ventricular system with crowding of the sulci at the vertex and upward bowing of the corpus callosum concerning for normal pressure hydrocephalus.  The paranasal sinuses and mastoid air cells are clear.  No concerning osseous findings.

## 2020-03-05 NOTE — ASSESSMENT & PLAN NOTE
-IV Protonix.   -Transfuse 2 units PRBCs.   -Monitor hemoglobin and hematocrit.   -GI consult.     3/5  GI following   Received 2 units of PRBC's yesterday  HBG 7.3, transfusied on unit of PRBC

## 2020-03-06 ENCOUNTER — TELEPHONE (OUTPATIENT)
Dept: GASTROENTEROLOGY | Facility: CLINIC | Age: 70
End: 2020-03-06

## 2020-03-06 VITALS
SYSTOLIC BLOOD PRESSURE: 138 MMHG | WEIGHT: 134.94 LBS | OXYGEN SATURATION: 97 % | DIASTOLIC BLOOD PRESSURE: 63 MMHG | HEART RATE: 81 BPM | TEMPERATURE: 98 F | RESPIRATION RATE: 20 BRPM

## 2020-03-06 DIAGNOSIS — K25.7 CHRONIC GASTRIC ULCER WITHOUT HEMORRHAGE AND WITHOUT PERFORATION: Primary | ICD-10-CM

## 2020-03-06 PROBLEM — E87.20 LACTIC ACIDOSIS: Status: RESOLVED | Noted: 2020-03-04 | Resolved: 2020-03-06

## 2020-03-06 PROBLEM — D64.9 SYMPTOMATIC ANEMIA: Status: RESOLVED | Noted: 2020-03-04 | Resolved: 2020-03-06

## 2020-03-06 PROBLEM — K92.2 UPPER GI BLEED: Status: RESOLVED | Noted: 2020-03-04 | Resolved: 2020-03-06

## 2020-03-06 LAB
ANION GAP SERPL CALC-SCNC: 5 MMOL/L (ref 8–16)
BASOPHILS # BLD AUTO: 0.03 K/UL (ref 0–0.2)
BASOPHILS NFR BLD: 0.4 % (ref 0–1.9)
BUN SERPL-MCNC: 45 MG/DL (ref 8–23)
CALCIUM SERPL-MCNC: 7.8 MG/DL (ref 8.7–10.5)
CHLORIDE SERPL-SCNC: 116 MMOL/L (ref 95–110)
CO2 SERPL-SCNC: 23 MMOL/L (ref 23–29)
CREAT SERPL-MCNC: 1 MG/DL (ref 0.5–1.4)
DIFFERENTIAL METHOD: ABNORMAL
EOSINOPHIL # BLD AUTO: 0.1 K/UL (ref 0–0.5)
EOSINOPHIL NFR BLD: 1.6 % (ref 0–8)
ERYTHROCYTE [DISTWIDTH] IN BLOOD BY AUTOMATED COUNT: 15.9 % (ref 11.5–14.5)
EST. GFR  (AFRICAN AMERICAN): >60 ML/MIN/1.73 M^2
EST. GFR  (NON AFRICAN AMERICAN): >60 ML/MIN/1.73 M^2
GLUCOSE SERPL-MCNC: 92 MG/DL (ref 70–110)
HCT VFR BLD AUTO: 25 % (ref 40–54)
HGB BLD-MCNC: 7.7 G/DL (ref 14–18)
IMM GRANULOCYTES # BLD AUTO: 0.05 K/UL (ref 0–0.04)
IMM GRANULOCYTES NFR BLD AUTO: 0.6 % (ref 0–0.5)
LYMPHOCYTES # BLD AUTO: 1 K/UL (ref 1–4.8)
LYMPHOCYTES NFR BLD: 12.3 % (ref 18–48)
MAGNESIUM SERPL-MCNC: 1.8 MG/DL (ref 1.6–2.6)
MCH RBC QN AUTO: 27.2 PG (ref 27–31)
MCHC RBC AUTO-ENTMCNC: 30.8 G/DL (ref 32–36)
MCV RBC AUTO: 88 FL (ref 82–98)
MONOCYTES # BLD AUTO: 0.9 K/UL (ref 0.3–1)
MONOCYTES NFR BLD: 11.1 % (ref 4–15)
NEUTROPHILS # BLD AUTO: 6 K/UL (ref 1.8–7.7)
NEUTROPHILS NFR BLD: 74 % (ref 38–73)
NRBC BLD-RTO: 0 /100 WBC
PHOSPHATE SERPL-MCNC: 2.9 MG/DL (ref 2.7–4.5)
PLATELET # BLD AUTO: 202 K/UL (ref 150–350)
PMV BLD AUTO: 9.9 FL (ref 9.2–12.9)
POTASSIUM SERPL-SCNC: 3.9 MMOL/L (ref 3.5–5.1)
RBC # BLD AUTO: 2.83 M/UL (ref 4.6–6.2)
SODIUM SERPL-SCNC: 144 MMOL/L (ref 136–145)
WBC # BLD AUTO: 8.11 K/UL (ref 3.9–12.7)

## 2020-03-06 PROCEDURE — 36415 COLL VENOUS BLD VENIPUNCTURE: CPT

## 2020-03-06 PROCEDURE — 63600175 PHARM REV CODE 636 W HCPCS: Performed by: PHYSICIAN ASSISTANT

## 2020-03-06 PROCEDURE — 94761 N-INVAS EAR/PLS OXIMETRY MLT: CPT

## 2020-03-06 PROCEDURE — 25000003 PHARM REV CODE 250: Performed by: PHYSICIAN ASSISTANT

## 2020-03-06 PROCEDURE — 94760 N-INVAS EAR/PLS OXIMETRY 1: CPT

## 2020-03-06 PROCEDURE — C9113 INJ PANTOPRAZOLE SODIUM, VIA: HCPCS | Performed by: PHYSICIAN ASSISTANT

## 2020-03-06 PROCEDURE — 85025 COMPLETE CBC W/AUTO DIFF WBC: CPT

## 2020-03-06 PROCEDURE — 83735 ASSAY OF MAGNESIUM: CPT

## 2020-03-06 PROCEDURE — 84100 ASSAY OF PHOSPHORUS: CPT

## 2020-03-06 PROCEDURE — 80048 BASIC METABOLIC PNL TOTAL CA: CPT

## 2020-03-06 RX ORDER — PANTOPRAZOLE SODIUM 40 MG/1
40 TABLET, DELAYED RELEASE ORAL 2 TIMES DAILY
Qty: 60 TABLET | Refills: 0 | Status: SHIPPED | OUTPATIENT
Start: 2020-03-06 | End: 2021-03-06

## 2020-03-06 RX ADMIN — PANTOPRAZOLE SODIUM 40 MG: 40 INJECTION, POWDER, LYOPHILIZED, FOR SOLUTION INTRAVENOUS at 08:03

## 2020-03-06 RX ADMIN — LEVETIRACETAM 500 MG: 100 SOLUTION ORAL at 08:03

## 2020-03-06 RX ADMIN — CARBIDOPA AND LEVODOPA 1 TABLET: 25; 100 TABLET ORAL at 02:03

## 2020-03-06 RX ADMIN — HYDROCODONE BITARTRATE AND ACETAMINOPHEN 1 TABLET: 5; 325 TABLET ORAL at 02:03

## 2020-03-06 RX ADMIN — CARBIDOPA AND LEVODOPA 1 TABLET: 25; 100 TABLET ORAL at 08:03

## 2020-03-06 NOTE — PLAN OF CARE
CM contacted the [facility] and obtained fax number.  Discharge paperwork faxed to [phone] via [facility].

## 2020-03-06 NOTE — PLAN OF CARE
"   03/06/20 0837   Discharge Assessment   Assessment Type Discharge Planning Assessment   Confirmed/corrected address and phone number on facesheet? Yes   Assessment information obtained from? Patient   Prior to hospitilization cognitive status: Not Oriented to Person;Not Oriented to Place;Not Oriented to Time   Prior to hospitalization functional status: Completely Dependent   Current cognitive status: Not Oriented to Place;Not Oriented to Time;Not Oriented to Person   Current Functional Status: Completely Dependent   Facility Arrived From: [facility]   Lives With facility resident   Able to Return to Prior Arrangements yes   Is patient able to care for self after discharge? No   Who are your caregiver(s) and their phone number(s)? Karen RN [phone]   Patient's perception of discharge disposition nursing home   Readmission Within the Last 30 Days no previous admission in last 30 days   Patient currently being followed by outpatient case management? No   Patient currently receives any other outside agency services? Yes   How many hours a day does the patient receive services? 24   Name and contact number of agency or person providing outside services [facility]   Is it the patient/care giver preference to resume care with the current outside agency? No   Equipment Currently Used at Home wheelchair   Do you have any problems affording any of your prescribed medications? No   Is the patient taking medications as prescribed? yes   Does the patient have transportation home? Yes   Transportation Anticipated agency   Does the patient receive services at the [facility]? No   Discharge Plan A Return to nursing home   DME Needed Upon Discharge  none   Patient/Family in Agreement with Plan yes     Spoke with pt's RN at his NH (Karen) as pt is non-verbal. Pt lives at [facility] in [place], [place]. Pt is WC bound and is "total care." He can return to his residence on " DC. CM DC needs pending hospital course but he is unlikely to require further DC intervention. Pt will not require bedside medication delivery.   Jase Coleman LMSW 3/6/2020 8:53 AM

## 2020-03-06 NOTE — PLAN OF CARE
Patient is a resident at [facility] in [place].  Number for report is [phone]

## 2020-03-06 NOTE — PLAN OF CARE
Pt unable to exhibit any orientation; non-verbal. Family at bedside at beginning of shift.  HOB @ 45 degrees. Wheels locked with side rails up X 2  Lungs are diminished with a weak cough. IV site CDI, saline lock.  Pt repositioned in bed q2h--not able to move independently.  SR on telemetry.  No distress noted.  Will continue to monitor.

## 2020-03-07 ENCOUNTER — HOSPITAL ENCOUNTER (INPATIENT)
Facility: HOSPITAL | Age: 70
LOS: 3 days | Discharge: OTHER INSTITUTION | DRG: 356 | End: 2020-03-11
Attending: INTERNAL MEDICINE | Admitting: FAMILY MEDICINE
Payer: MEDICARE

## 2020-03-07 DIAGNOSIS — E87.20 LACTIC ACIDOSIS: ICD-10-CM

## 2020-03-07 DIAGNOSIS — E87.6 HYPOKALEMIA: ICD-10-CM

## 2020-03-07 DIAGNOSIS — D62 ACUTE BLOOD LOSS ANEMIA: ICD-10-CM

## 2020-03-07 DIAGNOSIS — K56.41 FECAL IMPACTION: ICD-10-CM

## 2020-03-07 DIAGNOSIS — G93.40 ACUTE ENCEPHALOPATHY: ICD-10-CM

## 2020-03-07 DIAGNOSIS — G40.89 OTHER SEIZURES: ICD-10-CM

## 2020-03-07 DIAGNOSIS — K56.41 FECAL IMPACTION OF COLON: ICD-10-CM

## 2020-03-07 DIAGNOSIS — R57.9 SHOCK CIRCULATORY: ICD-10-CM

## 2020-03-07 DIAGNOSIS — R41.0 DELIRIUM: Primary | ICD-10-CM

## 2020-03-07 DIAGNOSIS — D50.0 ANEMIA DUE TO GI BLOOD LOSS: ICD-10-CM

## 2020-03-07 DIAGNOSIS — Z99.11 ON MECHANICALLY ASSISTED VENTILATION: ICD-10-CM

## 2020-03-07 DIAGNOSIS — K92.2 ACUTE UPPER GI BLEED: ICD-10-CM

## 2020-03-07 DIAGNOSIS — K92.2 GASTROINTESTINAL HEMORRHAGE, UNSPECIFIED GASTROINTESTINAL HEMORRHAGE TYPE: ICD-10-CM

## 2020-03-07 DIAGNOSIS — G20.A1 PARKINSON DISEASE: ICD-10-CM

## 2020-03-07 DIAGNOSIS — N17.9 AKI (ACUTE KIDNEY INJURY): ICD-10-CM

## 2020-03-07 DIAGNOSIS — R57.9 SHOCK: ICD-10-CM

## 2020-03-07 DIAGNOSIS — F41.1 GAD (GENERALIZED ANXIETY DISORDER): Chronic | ICD-10-CM

## 2020-03-07 DIAGNOSIS — K56.7 ILEUS: ICD-10-CM

## 2020-03-07 PROBLEM — D72.829 LEUKOCYTOSIS: Status: ACTIVE | Noted: 2020-03-07

## 2020-03-07 LAB
ABO + RH BLD: NORMAL
ALBUMIN SERPL BCP-MCNC: 2.4 G/DL (ref 3.5–5.2)
ALP SERPL-CCNC: 81 U/L (ref 55–135)
ALT SERPL W/O P-5'-P-CCNC: 15 U/L (ref 10–44)
ANION GAP SERPL CALC-SCNC: 17 MMOL/L (ref 8–16)
ANISOCYTOSIS BLD QL SMEAR: SLIGHT
AST SERPL-CCNC: 16 U/L (ref 10–40)
BASOPHILS # BLD AUTO: 0.04 K/UL (ref 0–0.2)
BASOPHILS NFR BLD: 0.2 % (ref 0–1.9)
BILIRUB SERPL-MCNC: 0.2 MG/DL (ref 0.1–1)
BILIRUB UR QL STRIP: NEGATIVE
BLD GP AB SCN CELLS X3 SERPL QL: NORMAL
BLD PROD TYP BPU: NORMAL
BLD PROD TYP BPU: NORMAL
BLOOD UNIT EXPIRATION DATE: NORMAL
BLOOD UNIT EXPIRATION DATE: NORMAL
BLOOD UNIT TYPE CODE: 6200
BLOOD UNIT TYPE CODE: 6200
BLOOD UNIT TYPE: NORMAL
BLOOD UNIT TYPE: NORMAL
BNP SERPL-MCNC: 31 PG/ML (ref 0–99)
BUN SERPL-MCNC: 59 MG/DL (ref 8–23)
BURR CELLS BLD QL SMEAR: ABNORMAL
CALCIUM SERPL-MCNC: 8 MG/DL (ref 8.7–10.5)
CHLORIDE SERPL-SCNC: 111 MMOL/L (ref 95–110)
CLARITY UR: CLEAR
CO2 SERPL-SCNC: 18 MMOL/L (ref 23–29)
CODING SYSTEM: NORMAL
CODING SYSTEM: NORMAL
COLOR UR: YELLOW
CREAT SERPL-MCNC: 1.3 MG/DL (ref 0.5–1.4)
DIFFERENTIAL METHOD: ABNORMAL
DISPENSE STATUS: NORMAL
DISPENSE STATUS: NORMAL
EOSINOPHIL # BLD AUTO: 0 K/UL (ref 0–0.5)
EOSINOPHIL NFR BLD: 0.1 % (ref 0–8)
ERYTHROCYTE [DISTWIDTH] IN BLOOD BY AUTOMATED COUNT: 16.7 % (ref 11.5–14.5)
EST. GFR  (AFRICAN AMERICAN): >60 ML/MIN/1.73 M^2
EST. GFR  (NON AFRICAN AMERICAN): 55 ML/MIN/1.73 M^2
GLUCOSE SERPL-MCNC: 126 MG/DL (ref 70–110)
GLUCOSE UR QL STRIP: NEGATIVE
HCT VFR BLD AUTO: 21.3 % (ref 40–54)
HCT VFR BLD AUTO: 22.3 % (ref 40–54)
HGB BLD-MCNC: 6.6 G/DL (ref 14–18)
HGB BLD-MCNC: 6.9 G/DL (ref 14–18)
HGB UR QL STRIP: NEGATIVE
IMM GRANULOCYTES # BLD AUTO: 0.18 K/UL (ref 0–0.04)
IMM GRANULOCYTES NFR BLD AUTO: 0.8 % (ref 0–0.5)
INFLUENZA A, MOLECULAR: NEGATIVE
INFLUENZA B, MOLECULAR: NEGATIVE
INR PPP: 1 (ref 0.8–1.2)
KETONES UR QL STRIP: NEGATIVE
LACTATE SERPL-SCNC: 2.2 MMOL/L (ref 0.5–2.2)
LACTATE SERPL-SCNC: 3.7 MMOL/L (ref 0.5–2.2)
LEUKOCYTE ESTERASE UR QL STRIP: NEGATIVE
LYMPHOCYTES # BLD AUTO: 0.6 K/UL (ref 1–4.8)
LYMPHOCYTES NFR BLD: 2.4 % (ref 18–48)
MCH RBC QN AUTO: 28.3 PG (ref 27–31)
MCHC RBC AUTO-ENTMCNC: 31 G/DL (ref 32–36)
MCV RBC AUTO: 91 FL (ref 82–98)
MONOCYTES # BLD AUTO: 1.4 K/UL (ref 0.3–1)
MONOCYTES NFR BLD: 5.8 % (ref 4–15)
NEUTROPHILS # BLD AUTO: 21.2 K/UL (ref 1.8–7.7)
NEUTROPHILS NFR BLD: 90.7 % (ref 38–73)
NITRITE UR QL STRIP: NEGATIVE
NRBC BLD-RTO: 0 /100 WBC
NUM UNITS TRANS PACKED RBC: NORMAL
NUM UNITS TRANS PACKED RBC: NORMAL
OVALOCYTES BLD QL SMEAR: ABNORMAL
PH UR STRIP: 5 [PH] (ref 5–8)
PLATELET # BLD AUTO: 274 K/UL (ref 150–350)
PLATELET BLD QL SMEAR: ABNORMAL
PMV BLD AUTO: 10.6 FL (ref 9.2–12.9)
POIKILOCYTOSIS BLD QL SMEAR: SLIGHT
POTASSIUM SERPL-SCNC: 4.4 MMOL/L (ref 3.5–5.1)
PROCALCITONIN SERPL IA-MCNC: 0.14 NG/ML
PROT SERPL-MCNC: 5.2 G/DL (ref 6–8.4)
PROT UR QL STRIP: NEGATIVE
PROTHROMBIN TIME: 10.8 SEC (ref 9–12.5)
RBC # BLD AUTO: 2.33 M/UL (ref 4.6–6.2)
SODIUM SERPL-SCNC: 146 MMOL/L (ref 136–145)
SP GR UR STRIP: 1.02 (ref 1–1.03)
SPECIMEN SOURCE: NORMAL
TROPONIN I SERPL DL<=0.01 NG/ML-MCNC: <0.006 NG/ML (ref 0–0.03)
URN SPEC COLLECT METH UR: NORMAL
UROBILINOGEN UR STRIP-ACNC: NEGATIVE EU/DL
WBC # BLD AUTO: 23.34 K/UL (ref 3.9–12.7)

## 2020-03-07 PROCEDURE — 63600175 PHARM REV CODE 636 W HCPCS: Performed by: NURSE PRACTITIONER

## 2020-03-07 PROCEDURE — 80053 COMPREHEN METABOLIC PANEL: CPT

## 2020-03-07 PROCEDURE — 25000003 PHARM REV CODE 250: Performed by: FAMILY MEDICINE

## 2020-03-07 PROCEDURE — C9113 INJ PANTOPRAZOLE SODIUM, VIA: HCPCS | Performed by: NURSE PRACTITIONER

## 2020-03-07 PROCEDURE — 84484 ASSAY OF TROPONIN QUANT: CPT

## 2020-03-07 PROCEDURE — 63600175 PHARM REV CODE 636 W HCPCS: Performed by: FAMILY MEDICINE

## 2020-03-07 PROCEDURE — 99223 PR INITIAL HOSPITAL CARE,LEVL III: ICD-10-PCS | Mod: ,,, | Performed by: INTERNAL MEDICINE

## 2020-03-07 PROCEDURE — 99291 CRITICAL CARE FIRST HOUR: CPT

## 2020-03-07 PROCEDURE — G0378 HOSPITAL OBSERVATION PER HR: HCPCS

## 2020-03-07 PROCEDURE — 25000003 PHARM REV CODE 250: Performed by: NURSE PRACTITIONER

## 2020-03-07 PROCEDURE — 96376 TX/PRO/DX INJ SAME DRUG ADON: CPT | Performed by: FAMILY MEDICINE

## 2020-03-07 PROCEDURE — 36415 COLL VENOUS BLD VENIPUNCTURE: CPT

## 2020-03-07 PROCEDURE — 96375 TX/PRO/DX INJ NEW DRUG ADDON: CPT | Performed by: FAMILY MEDICINE

## 2020-03-07 PROCEDURE — 87040 BLOOD CULTURE FOR BACTERIA: CPT

## 2020-03-07 PROCEDURE — 81003 URINALYSIS AUTO W/O SCOPE: CPT

## 2020-03-07 PROCEDURE — 86920 COMPATIBILITY TEST SPIN: CPT

## 2020-03-07 PROCEDURE — 84145 PROCALCITONIN (PCT): CPT

## 2020-03-07 PROCEDURE — 99223 1ST HOSP IP/OBS HIGH 75: CPT | Mod: ,,, | Performed by: INTERNAL MEDICINE

## 2020-03-07 PROCEDURE — 96361 HYDRATE IV INFUSION ADD-ON: CPT | Performed by: FAMILY MEDICINE

## 2020-03-07 PROCEDURE — 85018 HEMOGLOBIN: CPT

## 2020-03-07 PROCEDURE — 85025 COMPLETE CBC W/AUTO DIFF WBC: CPT

## 2020-03-07 PROCEDURE — 85014 HEMATOCRIT: CPT

## 2020-03-07 PROCEDURE — 87502 INFLUENZA DNA AMP PROBE: CPT

## 2020-03-07 PROCEDURE — P9016 RBC LEUKOCYTES REDUCED: HCPCS

## 2020-03-07 PROCEDURE — 96365 THER/PROPH/DIAG IV INF INIT: CPT | Performed by: FAMILY MEDICINE

## 2020-03-07 PROCEDURE — 83605 ASSAY OF LACTIC ACID: CPT

## 2020-03-07 PROCEDURE — 36430 TRANSFUSION BLD/BLD COMPNT: CPT

## 2020-03-07 PROCEDURE — 83880 ASSAY OF NATRIURETIC PEPTIDE: CPT

## 2020-03-07 PROCEDURE — 85610 PROTHROMBIN TIME: CPT

## 2020-03-07 PROCEDURE — 86850 RBC ANTIBODY SCREEN: CPT

## 2020-03-07 PROCEDURE — 83605 ASSAY OF LACTIC ACID: CPT | Mod: 91

## 2020-03-07 RX ORDER — SODIUM CHLORIDE 9 MG/ML
INJECTION, SOLUTION INTRAVENOUS CONTINUOUS
Status: DISCONTINUED | OUTPATIENT
Start: 2020-03-07 | End: 2020-03-11 | Stop reason: HOSPADM

## 2020-03-07 RX ORDER — PANTOPRAZOLE SODIUM 40 MG/10ML
40 INJECTION, POWDER, LYOPHILIZED, FOR SOLUTION INTRAVENOUS 2 TIMES DAILY
Status: DISCONTINUED | OUTPATIENT
Start: 2020-03-07 | End: 2020-03-07

## 2020-03-07 RX ORDER — POLYETHYLENE GLYCOL 3350 17 G/17G
17 POWDER, FOR SOLUTION ORAL DAILY
Status: DISCONTINUED | OUTPATIENT
Start: 2020-03-08 | End: 2020-03-08

## 2020-03-07 RX ORDER — SUCRALFATE 1 G/10ML
1 SUSPENSION ORAL
Status: DISCONTINUED | OUTPATIENT
Start: 2020-03-07 | End: 2020-03-08

## 2020-03-07 RX ORDER — CARBIDOPA AND LEVODOPA 25; 100 MG/1; MG/1
1 TABLET ORAL 3 TIMES DAILY
Status: DISCONTINUED | OUTPATIENT
Start: 2020-03-07 | End: 2020-03-08

## 2020-03-07 RX ORDER — LEVETIRACETAM 100 MG/ML
500 SOLUTION ORAL 2 TIMES DAILY
Status: DISCONTINUED | OUTPATIENT
Start: 2020-03-07 | End: 2020-03-08

## 2020-03-07 RX ORDER — SODIUM CHLORIDE, SODIUM LACTATE, POTASSIUM CHLORIDE, CALCIUM CHLORIDE 600; 310; 30; 20 MG/100ML; MG/100ML; MG/100ML; MG/100ML
INJECTION, SOLUTION INTRAVENOUS CONTINUOUS
Status: DISCONTINUED | OUTPATIENT
Start: 2020-03-07 | End: 2020-03-07

## 2020-03-07 RX ORDER — PANTOPRAZOLE SODIUM 40 MG/1
40 TABLET, DELAYED RELEASE ORAL 2 TIMES DAILY
Status: DISCONTINUED | OUTPATIENT
Start: 2020-03-07 | End: 2020-03-07

## 2020-03-07 RX ORDER — SIMVASTATIN 20 MG/1
20 TABLET, FILM COATED ORAL NIGHTLY
Status: DISCONTINUED | OUTPATIENT
Start: 2020-03-07 | End: 2020-03-08

## 2020-03-07 RX ORDER — SODIUM CHLORIDE 0.9 % (FLUSH) 0.9 %
10 SYRINGE (ML) INJECTION
Status: DISCONTINUED | OUTPATIENT
Start: 2020-03-07 | End: 2020-03-11 | Stop reason: HOSPADM

## 2020-03-07 RX ORDER — METOCLOPRAMIDE HYDROCHLORIDE 5 MG/ML
10 INJECTION INTRAMUSCULAR; INTRAVENOUS ONCE
Status: COMPLETED | OUTPATIENT
Start: 2020-03-07 | End: 2020-03-07

## 2020-03-07 RX ORDER — ACETAMINOPHEN 325 MG/1
650 TABLET ORAL EVERY 8 HOURS PRN
Status: DISCONTINUED | OUTPATIENT
Start: 2020-03-07 | End: 2020-03-11 | Stop reason: HOSPADM

## 2020-03-07 RX ORDER — HYDROCODONE BITARTRATE AND ACETAMINOPHEN 500; 5 MG/1; MG/1
TABLET ORAL
Status: DISCONTINUED | OUTPATIENT
Start: 2020-03-07 | End: 2020-03-10

## 2020-03-07 RX ADMIN — SIMVASTATIN 20 MG: 20 TABLET, FILM COATED ORAL at 09:03

## 2020-03-07 RX ADMIN — PIPERACILLIN AND TAZOBACTAM 4.5 G: 4; .5 INJECTION, POWDER, LYOPHILIZED, FOR SOLUTION INTRAVENOUS; PARENTERAL at 09:03

## 2020-03-07 RX ADMIN — CARBIDOPA AND LEVODOPA 1 TABLET: 25; 100 TABLET ORAL at 09:03

## 2020-03-07 RX ADMIN — PANTOPRAZOLE SODIUM 40 MG: 40 INJECTION, POWDER, LYOPHILIZED, FOR SOLUTION INTRAVENOUS at 09:03

## 2020-03-07 RX ADMIN — SODIUM CHLORIDE: 0.9 INJECTION, SOLUTION INTRAVENOUS at 09:03

## 2020-03-07 RX ADMIN — SUCRALFATE 1 G: 1 SUSPENSION ORAL at 09:03

## 2020-03-07 RX ADMIN — DEXTROSE 8 MG/HR: 50 INJECTION, SOLUTION INTRAVENOUS at 11:03

## 2020-03-07 RX ADMIN — METOCLOPRAMIDE 10 MG: 5 INJECTION, SOLUTION INTRAMUSCULAR; INTRAVENOUS at 11:03

## 2020-03-07 RX ADMIN — LEVETIRACETAM 500 MG: 100 SOLUTION ORAL at 09:03

## 2020-03-07 NOTE — SUBJECTIVE & OBJECTIVE
Past Medical History:   Diagnosis Date    Abdominal aortic aneurysm     Acute bronchospasm     Anxiety disorder due to known physiological condition     Atherosclerosis     Candidiasis of skin and nail     Chronic constipation     Conduct disorder, unspecified     Deficiency of other specified B group vitamins     Dehydration     Essential hypertension     Extended spectrum beta lactamase (ESBL) resistance     Generalized anxiety disorder     GERD with esophagitis     Hyperlipidemia, mixed     Other seasonal allergic rhinitis     Other seizures     Parkinson disease     Seizure     Ulcerative colitis     Vitamin D deficiency        Past Surgical History:   Procedure Laterality Date    ESOPHAGOGASTRODUODENOSCOPY N/A 3/5/2020    Procedure: EGD (ESOPHAGOGASTRODUODENOSCOPY);  Surgeon: Dane Ashraf III, MD;  Location: [facility];  Service: Endoscopy;  Laterality: N/A;       Review of patient's allergies indicates:  No Known Allergies    Current Facility-Administered Medications on File Prior to Encounter   Medication    [DISCONTINUED] 0.9%  NaCl infusion (for blood administration)    [DISCONTINUED] 0.9%  NaCl infusion (for blood administration)    [DISCONTINUED] 0.9%  NaCl infusion (for blood administration)    [DISCONTINUED] acetaminophen tablet 650 mg    [DISCONTINUED] carbidopa-levodopa  mg per tablet 1 tablet    [DISCONTINUED] hydrALAZINE injection 10 mg    [DISCONTINUED] HYDROcodone-acetaminophen 5-325 mg per tablet 1 tablet    [DISCONTINUED] levETIRAcetam 100 mg/mL solution 500 mg    [DISCONTINUED] ondansetron disintegrating tablet 8 mg    [DISCONTINUED] pantoprazole injection 40 mg    [DISCONTINUED] simvastatin tablet 20 mg    [DISCONTINUED] sodium chloride 0.9% flush 10 mL     Current Outpatient Medications on File Prior to Encounter   Medication Sig    acetaminophen (TYLENOL) 650 MG TbSR Take 650 mg by mouth every 6 (six) hours as needed.    aspirin (ECOTRIN) 81 MG EC  tablet aspirin 81 mg tablet,delayed release   Direction: 1 tablet; 81 MG; Once a day;  Dispense Unit: Tablet Delayed Release;  Dose Route: Orally  Date Received : 06/03/2016    carbidopa-levodopa  mg (SINEMET)  mg per tablet Take 1 tablet by mouth 3 (three) times daily.     cyanocobalamin 1,000 mcg/mL injection Inject 1,000 mcg into the muscle.     levETIRAcetam (KEPPRA) 100 mg/mL Soln Take 500 mg by mouth 2 (two) times daily.     pantoprazole (PROTONIX) 40 MG tablet Take 1 tablet (40 mg total) by mouth 2 (two) times daily.    potassium chloride SA (K-DUR,KLOR-CON) 20 MEQ tablet Take 20 mEq by mouth.    simvastatin (ZOCOR) 20 MG tablet Take 20 mg by mouth every evening.     cholecalciferol, vitamin D3, 125 mcg (5,000 unit) Tab Take 5,000 Units by mouth.    [DISCONTINUED] CALCIUM CARBONATE ORAL Take 500 mg by mouth once daily.    [DISCONTINUED] terazosin (HYTRIN) 2 MG capsule Take 2 mg by mouth every evening.      Family History     None        Tobacco Use    Smoking status: Unknown If Ever Smoked   Substance and Sexual Activity    Alcohol use: Not on file    Drug use: Never    Sexual activity: Not Currently     Review of Systems   Unable to perform ROS: Dementia     Objective:     Vital Signs (Most Recent):  Temp: 97.5 °F (36.4 °C) (03/07/20 1235)  Pulse: 79 (03/07/20 1515)  Resp: 19 (03/07/20 1515)  BP: 112/68 (03/07/20 1515)  SpO2: 100 % (03/07/20 1515) Vital Signs (24h Range):  Temp:  [97.5 °F (36.4 °C)] 97.5 °F (36.4 °C)  Pulse:  [76-85] 79  Resp:  [12-20] 19  SpO2:  [98 %-100 %] 100 %  BP: ()/(56-68) 112/68     Weight: 64.1 kg (141 lb 4.8 oz)  There is no height or weight on file to calculate BMI.    Physical Exam   Constitutional: He appears well-developed and well-nourished.   Frail    HENT:   Head: Normocephalic and atraumatic.   Eyes: Conjunctivae are normal.   Neck: Neck supple. No JVD present.   Cardiovascular: Normal rate, regular rhythm and normal heart sounds.    Pulmonary/Chest: Effort normal and breath sounds normal. He has no wheezes.   Abdominal: Soft. Bowel sounds are normal. He exhibits no distension. There is no tenderness.   Musculoskeletal: Normal range of motion.   Bilateral upper and lower extremity contractures noted.    Neurological: He is alert.   Oriented to person. Disoriented to place and time.    Skin: Skin is warm and dry. No rash noted. There is pallor.   Bilateral lower extremities with scratches and bruising    Psychiatric: He has a normal mood and affect. His behavior is normal. Thought content normal.   Nursing note and vitals reviewed.          Significant Labs:   Blood Culture: No results for input(s): LABBLOO in the last 48 hours.  BMP:   Recent Labs   Lab 03/06/20  0523 03/07/20  1251   GLU 92 126*    146*   K 3.9 4.4   * 111*   CO2 23 18*   BUN 45* 59*   CREATININE 1.0 1.3   CALCIUM 7.8* 8.0*   MG 1.8  --      CBC:   Recent Labs   Lab 03/06/20  0523 03/07/20  1251   WBC 8.11 23.34*   HGB 7.7* 6.6*   HCT 25.0* 21.3*    274     CMP:   Recent Labs   Lab 03/06/20  0523 03/07/20  1251    146*   K 3.9 4.4   * 111*   CO2 23 18*   GLU 92 126*   BUN 45* 59*   CREATININE 1.0 1.3   CALCIUM 7.8* 8.0*   PROT  --  5.2*   ALBUMIN  --  2.4*   BILITOT  --  0.2   ALKPHOS  --  81   AST  --  16   ALT  --  15   ANIONGAP 5* 17*   EGFRNONAA >60 55*     Urine Studies:   Recent Labs   Lab 03/07/20  1621   COLORU Yellow   APPEARANCEUA Clear   PHUR 5.0   SPECGRAV 1.020   PROTEINUA Negative   GLUCUA Negative   KETONESU Negative   BILIRUBINUA Negative   OCCULTUA Negative   NITRITE Negative   UROBILINOGEN Negative   LEUKOCYTESUR Negative     All pertinent labs within the past 24 hours have been reviewed.    Significant Imaging:   Imaging Results          CT Chest Abdoment Pelvis Without Contrast (XPD) (Final result)  Result time 03/07/20 15:44:20    Final result by Adebayo Carter MD (03/07/20 15:44:20)                 Impression:       Prominent elevation of the right diaphragm with compressive atelectasis right lung base.  Nonspecific atelectasis left lung base.  Aneurysmal dilatation of the aortic root measuring 4 cm.    Constipation with little high-grade rectal fecal impaction.    Abdominal aortic ectasia with maximum luminal diameter 3.1 cm.      Electronically signed by: Adebayo Carter MD  Date:    03/07/2020  Time:    15:44             Narrative:    EXAMINATION:  CT CHEST ABDOMEN PELVIS WITHOUT CONTRAST(XPD)    CLINICAL HISTORY:  Abdominal aortic aneurysm.    TECHNIQUE:  Standard CT technique.  All CT scans at this facility are performed  using dose modulation techniques as appropriate to performed exam including the following:  automated exposure control; adjustment of mA and/or kV according to the patients size (this includes techniques or standardized protocols for targeted exams where dose is matched to indication/reason for exam: i.e. extremities or head);  iterative reconstruction technique.    COMPARISON:  None    FINDINGS:  CT chest: The cardiac size is normal.  Minor coronary artery calcification is present.  Mild aneurysmal dilatation of the ascending aorta is present measuring 4 cm in diameter.  There is prominent elevation of the left diaphragm with compressive type atelectasis of the right lung base.  Minor subpleural atelectasis left lower lobe.  No pleural effusion.    CT abdomen and pelvis: Elevation of the right diaphragm is noted.  There is large bowel interposed between the liver and the diaphragm.  There is a large amount of large bowel stool compatible with constipation.  In the rectum there is a large rectal fecal impaction.    The liver and spleen are unremarkable.  The gallbladder reveals several small densities which could represent stones.    The kidneys are grossly normal with no stones or hydronephrosis.    The abdominal aorta reveals mild ectasia with a maximum diameter of 3.1 cm.  Mild atherosclerotic  calcification of the iliac vessels is noted as well.    No free fluid.                               X-Ray Chest 1 View (Final result)  Result time 03/07/20 13:38:18    Final result by Adebayo Carter MD (03/07/20 13:38:18)                 Impression:      Prominent elevation of the right diaphragm with increasing discoid atelectasis right lung base.      Electronically signed by: Adebayo Carter MD  Date:    03/07/2020  Time:    13:38             Narrative:    EXAMINATION:  XR CHEST 1 VIEW    CLINICAL HISTORY:  Respiratory distress.,    COMPARISON:  03/04/2020.    FINDINGS:  There is prominent elevation of the right diaphragm with large bowel subjacent to the diaphragm.    There is associated compressive atelectasis at the right lung base.    The heart size is mildly enlarged.    The left lung is grossly clear.                               CT Head Without Contrast (Final result)  Result time 03/07/20 13:35:29    Final result by Adebayo Carter MD (03/07/20 13:35:29)                 Impression:      No acute findings.  Mild-to-moderate atrophy.      Electronically signed by: Adebayo Carter MD  Date:    03/07/2020  Time:    13:35             Narrative:    EXAMINATION:  CT HEAD WITHOUT CONTRAST    CLINICAL HISTORY:  Unresponsive.  Slurred speech.  Confusion.  TIA.    TECHNIQUE:  Standard noncontrast CT of the brain.    All CT scans at this facility are performed  using dose modulation techniques as appropriate to performed exam including the following:  automated exposure control; adjustment of mA and/or kV according to the patients size (this includes techniques or standardized protocols for targeted exams where dose is matched to indication/reason for exam: i.e. extremities or head);  iterative reconstruction technique.    COMPARISON:  03/04/2020    FINDINGS:  The ventricles are moderately enlarged as are the extra-axial CSF spaces.    No change since the previous exam.    No acute hemorrhage, edema  or mass effect is identified.    The skull is grossly normal.

## 2020-03-07 NOTE — PLAN OF CARE
Went over discharge instructions with Nurse at nursing home  Stressed importance of making and keeping all follow ups as well as making prescribed medication changes.   Prescription sent with patient   IV removed without complications.  Telemetry box removed and returned to monitor tech.  PCT sent to pt's room to discharge pt via stretcher to   Primary nurse notified of pt's discharge status.

## 2020-03-07 NOTE — ED NOTES
EMS reports patient was responsive upon their arrival to pt home but was told by nursing home staff he was lethargic and unresponsive this morning.    Patient moved to ED room 4 patient assisted onto stretcher and changed into a gown. Patient placed on cardiac monitor, continuous pulse oximetry and automatic blood pressure cuff. Bed placed in low locked position, side rails up x 2, call light is within reach of patient or family, orientation to room and explanation of wait provided to family and patient, alarms set and turned on for monitor and pulse ox, awaiting MD evaluation and orders, will continue to monitor.    Patient identifies self as Adebayo Oneil      LOC: The patient is awake, alert and aware of environment. The patient is oriented to self, speech is unintelligible.  APPEARANCE: Patient resting comfortably and in no acute distress, patient is clean and well groomed, patient's clothing is properly fastened.  SKIN: The skin is cool to the touch and dry, color consistent with ethnicity but pale, patient has normal skin turgor and moist mucus membranes, skin intact, no breakdown or bruising noted.  MUSCULOSKELETAL: Patient moving all extremities well, no obvious swelling or deformities noted. Passive ROM performed well.  RESPIRATORY: Airway is open and patent, respirations are spontaneous, patient has a normal effort and rate, no accessory muscle use noted.  CARDIAC: Patient has a normal rate and rhythm, no peripheral edema noted, capillary refill < 3 seconds.  ABDOMEN: Soft and non tender to palpation, no distention noted.  NEUROLOGIC: PERRL, eyes open spontaneously, behavior appropriate to situation, follows commands, facial expression symmetrical, bilateral hand grasp equal and even, purposeful motor response noted, normal sensation in all extremities when touched with a finger.

## 2020-03-07 NOTE — HPI
"Adebayo Oneil is a 69 y.o. 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 Pt was unresponsive at 9:30 this morning and [facility] was called. Upon AMS arrival pt was responsive but mumbled his speech and was "more confused" than baseline. Pt was discharged yesterday with hytrin. [facility] states that pt's blood pressure was 75/43 upon arrival and 80/35 en route. Associated sxs include confusion.  In the ED, pt was initial hypotensive, WBCs 23.34,  hemoglobin of 6.6, creatinine of 1.3 and BUN of 59, albumin of 2.4 and lactic acid of 3.7. CT head negative for acute findings. CT abdomen showed prominent elevation of the right diaphragm with compressive atelectasis right lung base. Aneurysmal dilatation of the aortic root measuring 4 cm. Constipation with little high-grade rectal fecal impaction. Abdominal aortic ectasia with maximum luminal diameter 3.1 cm. His code status is noted to be DNR and Danni Howell is his power of .   He will be kept on OBS for lactic acidosis under the care of Hospital Medicine  "

## 2020-03-07 NOTE — ED PROVIDER NOTES
"SCRIBE #1 NOTE: I, Josue Sotomayor, am scribing for, and in the presence of, Trang Strickland MD. I have scribed the entire note.       History     Chief Complaint   Patient presents with    Altered Mental Status     patient with sudden onset of unresponsive at 0930, upon [facility] arrival patient responsive but speech mummbled and " more confused"     Review of patient's allergies indicates:  No Known Allergies      History of Present Illness     HPI    3/7/2020, 12:42 PM  History obtained from EMS  HPI limited due to mental status of patient      History of Present Illness: Adebayo Oneil is a 69 y.o. 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 Pt was unresponsive at 9:30 this morning and [facility] was called. Upon [facility] arrival pt was responsive but mumbled his speech and was "more confused" than baseline. Pt was discharged yesterday with hytrin. [facility] states that pt's blood pressure was 75/43 upon arrival and 80/35 en route.Symptoms are constant and moderate in severity. No mitigating or exacerbating factors reported. Associated sxs include confusion.  No prior Tx reported. No further complaints or concerns at this time.       Arrival mode: [facility]    PCP: Provider [facility]        Past Medical History:  Past Medical History:   Diagnosis Date    Abdominal aortic aneurysm     Acute bronchospasm     Anxiety disorder due to known physiological condition     Atherosclerosis     Candidiasis of skin and nail     Chronic constipation     Conduct disorder, unspecified     Deficiency of other specified B " group vitamins     Dehydration     Essential hypertension     Extended spectrum beta lactamase (ESBL) resistance     Generalized anxiety disorder     GERD with esophagitis     Hyperlipidemia, mixed     Other seasonal allergic rhinitis     Other seizures     Parkinson disease     Seizure     Ulcerative colitis     Vitamin D deficiency        Past Surgical History:  Past Surgical History:   Procedure Laterality Date    ESOPHAGOGASTRODUODENOSCOPY N/A 3/5/2020    Procedure: EGD (ESOPHAGOGASTRODUODENOSCOPY);  Surgeon: Dane Ashraf III, MD;  Location: [facility];  Service: Endoscopy;  Laterality: N/A;         Family History:  History reviewed. No pertinent family history.      Social History:  Social History     Tobacco Use    Smoking status: Unknown If Ever Smoked   Substance and Sexual Activity    Alcohol use: Unknown    Drug use: Unknown    Sexual activity: Unknown        Review of Systems     Review of Systems   Unable to perform ROS: Mental status change   Psychiatric/Behavioral: Positive for confusion.      Physical Exam     Initial Vitals [03/07/20 1235]   BP Pulse Resp Temp SpO2   (!) 89/66 79 20 97.5 °F (36.4 °C) 98 %      MAP       --          Physical Exam  Nursing Notes and Vital Signs Reviewed.  Constitutional: Patient is in no acute distress. Well-developed and well-nourished. Wearing diapers.  Head: Atraumatic. Normocephalic.  Eyes: PERRL. EOM intact. Conjunctivae are not pale. No scleral icterus.  ENT: Mucous membranes are moist. Oropharynx is clear and symmetric.    Neck: Supple. Full ROM. No lymphadenopathy.  Cardiovascular: Regular rate. Regular rhythm. No murmurs, rubs, or gallops. Distal pulses are 2+ and symmetric.  Pulmonary/Chest: No respiratory distress. Clear to auscultation bilaterally. No wheezing or rales.  Abdominal: Soft and non-distended. There is no tenderness to palpation. No rebound or guarding. Good bowel sounds.  Genitourinary: No CVA tenderness  Musculoskeletal: No  obvious deformities. No edema. No calf tenderness. Contractures in all 4 limbs but moves all 4 limbs.  Skin: Warm and dry.  Neurological:  Alert, awake, and responds to commands. Grimaces and mumbles.  Psychiatric: Good eye contact. Grimaces and mumbles. Responds to commands.     ED Course   Critical Care  Date/Time: 3/7/2020 3:25 PM  Performed by: Trang Strickland MD  Authorized by: Trang Strickland MD   Direct patient critical care time: 10 minutes  Additional history critical care time: 5 minutes  Ordering / reviewing critical care time: 5 minutes  Documentation critical care time: 5 minutes  Consulting other physicians critical care time: 5 minutes  Total critical care time (exclusive of procedural time) : 30 minutes  Critical care time was exclusive of separately billable procedures and treating other patients and teaching time.  Critical care was necessary to treat or prevent imminent or life-threatening deterioration of the following conditions: sepsis.  Critical care was time spent personally by me on the following activities: blood draw for specimens, development of treatment plan with patient or surrogate, discussions with consultants, interpretation of cardiac output measurements, evaluation of patient's response to treatment, examination of patient, obtaining history from patient or surrogate, ordering and performing treatments and interventions, ordering and review of laboratory studies, ordering and review of radiographic studies, pulse oximetry, re-evaluation of patient's condition and review of old charts.        ED Vital Signs:  Vitals:    03/07/20 1235 03/07/20 1243 03/07/20 1247 03/07/20 1249   BP: (!) 89/66  (!) 113/58    Pulse: 79   76   Resp: 20   12   Temp: 97.5 °F (36.4 °C)      TempSrc: Oral      SpO2: 98%  100% 100%   Weight:  64.1 kg (141 lb 4.8 oz)      03/07/20 1300 03/07/20 1450 03/07/20 1515   BP: 118/63 (!) 103/56 112/68   Pulse: 85 78 79   Resp: 17 19 19   Temp:      TempSrc:       SpO2: 100% 100% 100%   Weight:          Abnormal Lab Results:  Labs Reviewed   CBC W/ AUTO DIFFERENTIAL - Abnormal; Notable for the following components:       Result Value    WBC 23.34 (*)     RBC 2.33 (*)     Hemoglobin 6.6 (*)     Hematocrit 21.3 (*)     Mean Corpuscular Hemoglobin Conc 31.0 (*)     RDW 16.7 (*)     Immature Granulocytes 0.8 (*)     Gran # (ANC) 21.2 (*)     Immature Grans (Abs) 0.18 (*)     Lymph # 0.6 (*)     Mono # 1.4 (*)     Gran% 90.7 (*)     Lymph% 2.4 (*)     All other components within normal limits   COMPREHENSIVE METABOLIC PANEL - Abnormal; Notable for the following components:    Sodium 146 (*)     Chloride 111 (*)     CO2 18 (*)     Glucose 126 (*)     BUN, Bld 59 (*)     Calcium 8.0 (*)     Total Protein 5.2 (*)     Albumin 2.4 (*)     Anion Gap 17 (*)     eGFR if non  55 (*)     All other components within normal limits   LACTIC ACID, PLASMA - Abnormal; Notable for the following components:    Lactate (Lactic Acid) 3.7 (*)     All other components within normal limits    Narrative:     LA  critical result(s) called and verbal readback obtained from MADLONADO WALTON RN by BRIDGER 03/07/2020 14:14   INFLUENZA A & B BY MOLECULAR   CULTURE, BLOOD   CULTURE, BLOOD   B-TYPE NATRIURETIC PEPTIDE   PROTIME-INR   TROPONIN I   URINALYSIS, REFLEX TO URINE CULTURE    Narrative:     Preferred Collection Type->Urine, Clean Catch   PROCALCITONIN   LACTIC ACID, PLASMA   TYPE & SCREEN   PREPARE RBC SOFT        All Lab Results:  Results for orders placed or performed during the hospital encounter of 03/07/20   Influenza A & B by Molecular   Result Value Ref Range    Influenza A, Molecular Negative Negative    Influenza B, Molecular Negative Negative    Flu A & B Source Nasal swab    Brain natriuretic peptide   Result Value Ref Range    BNP 31 0 - 99 pg/mL   CBC auto differential   Result Value Ref Range    WBC 23.34 (H) 3.90 - 12.70 K/uL    RBC 2.33 (L) 4.60 - 6.20 M/uL    Hemoglobin  6.6 (L) 14.0 - 18.0 g/dL    Hematocrit 21.3 (L) 40.0 - 54.0 %    Mean Corpuscular Volume 91 82 - 98 fL    Mean Corpuscular Hemoglobin 28.3 27.0 - 31.0 pg    Mean Corpuscular Hemoglobin Conc 31.0 (L) 32.0 - 36.0 g/dL    RDW 16.7 (H) 11.5 - 14.5 %    Platelets 274 150 - 350 K/uL    MPV 10.6 9.2 - 12.9 fL    Immature Granulocytes 0.8 (H) 0.0 - 0.5 %    Gran # (ANC) 21.2 (H) 1.8 - 7.7 K/uL    Immature Grans (Abs) 0.18 (H) 0.00 - 0.04 K/uL    Lymph # 0.6 (L) 1.0 - 4.8 K/uL    Mono # 1.4 (H) 0.3 - 1.0 K/uL    Eos # 0.0 0.0 - 0.5 K/uL    Baso # 0.04 0.00 - 0.20 K/uL    nRBC 0 0 /100 WBC    Gran% 90.7 (H) 38.0 - 73.0 %    Lymph% 2.4 (L) 18.0 - 48.0 %    Mono% 5.8 4.0 - 15.0 %    Eosinophil% 0.1 0.0 - 8.0 %    Basophil% 0.2 0.0 - 1.9 %    Platelet Estimate Appears normal     Aniso Slight     Poik Slight     Ovalocytes Occasional     [place] Cells Occasional     Differential Method Automated    Comprehensive metabolic panel   Result Value Ref Range    Sodium 146 (H) 136 - 145 mmol/L    Potassium 4.4 3.5 - 5.1 mmol/L    Chloride 111 (H) 95 - 110 mmol/L    CO2 18 (L) 23 - 29 mmol/L    Glucose 126 (H) 70 - 110 mg/dL    BUN, Bld 59 (H) 8 - 23 mg/dL    Creatinine 1.3 0.5 - 1.4 mg/dL    Calcium 8.0 (L) 8.7 - 10.5 mg/dL    Total Protein 5.2 (L) 6.0 - 8.4 g/dL    Albumin 2.4 (L) 3.5 - 5.2 g/dL    Total Bilirubin 0.2 0.1 - 1.0 mg/dL    Alkaline Phosphatase 81 55 - 135 U/L    AST 16 10 - 40 U/L    ALT 15 10 - 44 U/L    Anion Gap 17 (H) 8 - 16 mmol/L    eGFR if African American >60 >60 mL/min/1.73 m^2    eGFR if non African American 55 (A) >60 mL/min/1.73 m^2   Lactic acid, plasma   Result Value Ref Range    Lactate (Lactic Acid) 3.7 (HH) 0.5 - 2.2 mmol/L   Protime-INR   Result Value Ref Range    Prothrombin Time 10.8 9.0 - 12.5 sec    INR 1.0 0.8 - 1.2   Troponin I   Result Value Ref Range    Troponin I <0.006 0.000 - 0.026 ng/mL   Urinalysis, Reflex to Urine Culture Urine, Clean Catch   Result Value Ref Range    Specimen UA Urine,  Catheterized     Color, UA Yellow Yellow, Straw, Al[place] Delia    Appearance, UA Clear Clear    pH, UA 5.0 5.0 - 8.0    Specific Gravity, UA 1.020 1.005 - 1.030    Protein, UA Negative Negative    Glucose, UA Negative Negative    Ketones, UA Negative Negative    Bilirubin (UA) Negative Negative    Occult Blood UA Negative Negative    Nitrite, UA Negative Negative    Urobilinogen, UA Negative <2.0 EU/dL    Leukocytes, UA Negative Negative   Procalcitonin   Result Value Ref Range    Procalcitonin 0.14 <0.25 ng/mL   Type & Screen   Result Value Ref Range    Group & Rh A POS     Indirect Tiffany NEG    Prepare RBC 1 Unit   Result Value Ref Range    UNIT NUMBER [id number]     Product Code [id number]     DISPENSE STATUS CROSSMATCHED     CODING SYSTEM [id number]     Unit Blood Type Code 6200     Unit Blood Type A POS     Unit Expiration [id number]        Imaging Results:  Imaging Results          CT Chest Abdoment Pelvis Without Contrast (XPD) (Final result)  Result time 03/07/20 15:44:20    Final result by Adebayo Carter MD (03/07/20 15:44:20)                 Impression:      Prominent elevation of the right diaphragm with compressive atelectasis right lung base.  Nonspecific atelectasis left lung base.  Aneurysmal dilatation of the aortic root measuring 4 cm.    Constipation with little high-grade rectal fecal impaction.    Abdominal aortic ectasia with maximum luminal diameter 3.1 cm.      Electronically signed by: Adebayo Carter MD  Date:    03/07/2020  Time:    15:44             Narrative:    EXAMINATION:  CT CHEST ABDOMEN PELVIS WITHOUT CONTRAST(XPD)    CLINICAL HISTORY:  Abdominal aortic aneurysm.    TECHNIQUE:  Standard CT technique.  All CT scans at this facility are performed  using dose modulation techniques as appropriate to performed exam including the following:  automated exposure control; adjustment of mA and/or kV according to the patients size (this includes techniques or standardized protocols for  targeted exams where dose is matched to indication/reason for exam: i.e. extremities or head);  iterative reconstruction technique.    COMPARISON:  None    FINDINGS:  CT chest: The cardiac size is normal.  Minor coronary artery calcification is present.  Mild aneurysmal dilatation of the ascending aorta is present measuring 4 cm in diameter.  There is prominent elevation of the left diaphragm with compressive type atelectasis of the right lung base.  Minor subpleural atelectasis left lower lobe.  No pleural effusion.    CT abdomen and pelvis: Elevation of the right diaphragm is noted.  There is large bowel interposed between the liver and the diaphragm.  There is a large amount of large bowel stool compatible with constipation.  In the rectum there is a large rectal fecal impaction.    The liver and spleen are unremarkable.  The gallbladder reveals several small densities which could represent stones.    The kidneys are grossly normal with no stones or hydronephrosis.    The abdominal aorta reveals mild ectasia with a maximum diameter of 3.1 cm.  Mild atherosclerotic calcification of the iliac vessels is noted as well.    No free fluid.                               X-Ray Chest 1 View (Final result)  Result time 03/07/20 13:38:18    Final result by Adebayo Carter MD (03/07/20 13:38:18)                 Impression:      Prominent elevation of the right diaphragm with increasing discoid atelectasis right lung base.      Electronically signed by: Adebayo Carter MD  Date:    03/07/2020  Time:    13:38             Narrative:    EXAMINATION:  XR CHEST 1 VIEW    CLINICAL HISTORY:  Respiratory distress.,    COMPARISON:  03/04/2020.    FINDINGS:  There is prominent elevation of the right diaphragm with large bowel subjacent to the diaphragm.    There is associated compressive atelectasis at the right lung base.    The heart size is mildly enlarged.    The left lung is grossly clear.                               CT  Head Without Contrast (Final result)  Result time 03/07/20 13:35:29    Final result by Adebayo Carter MD (03/07/20 13:35:29)                 Impression:      No acute findings.  Mild-to-moderate atrophy.      Electronically signed by: Adebayo Carter MD  Date:    03/07/2020  Time:    13:35             Narrative:    EXAMINATION:  CT HEAD WITHOUT CONTRAST    CLINICAL HISTORY:  Unresponsive.  Slurred speech.  Confusion.  TIA.    TECHNIQUE:  Standard noncontrast CT of the brain.    All CT scans at this facility are performed  using dose modulation techniques as appropriate to performed exam including the following:  automated exposure control; adjustment of mA and/or kV according to the patients size (this includes techniques or standardized protocols for targeted exams where dose is matched to indication/reason for exam: i.e. extremities or head);  iterative reconstruction technique.    COMPARISON:  03/04/2020    FINDINGS:  The ventricles are moderately enlarged as are the extra-axial CSF spaces.    No change since the previous exam.    No acute hemorrhage, edema or mass effect is identified.    The skull is grossly normal.                               The EKG was ordered, reviewed, and independently interpreted by the ED provider.  Interpretation time: 1301  Rate: 78 BPM  Rhythm: normal sinus rhythm  Interpretation: Nonspecific ST and T wave abnormality. No STEMI.         The Emergency Provider reviewed the vital signs and test results, which are outlined above.     ED Discussion     12:58 PM: Discussed pt with family members. Family states that pt looks better than he did yesterday.    2:49 PM: Discussed pt with family members, as white count was high. Family states that pt was septic in the past due to stool impaction. Ordering CT.    2:51 PM: Discussed pt's case with Dr. Cary ([facility]) who states that the x-ray is suspicious for possible free air under the diaphragm. Agrees with choice of  CT.    4:16 PM: Discussed case with Joao Cano MD ([facility]). Dr. Cano agrees with current care and management of pt and accepts admission.   Admitting Service: [facility]  Admitting Physician: Dr. Cano  Admit to: Observation    4:16 PM: Re-evaluated pt. I have discussed test results, shared treatment plan, and the need for admission with patient and family at bedside. Pt and family express understanding at this time and agree with all information. All questions answered. Pt and family have no further questions or concerns at this time. Pt is ready for admit.         Medical Decision Making:   Clinical Tests:   Lab Tests: Ordered and Reviewed  Radiological Study: Ordered and Reviewed  Medical Tests: Ordered and Reviewed           ED Medication(s):  Medications   sodium chloride 0.9% flush 10 mL (has no administration in time range)   carbidopa-levodopa  mg per tablet 1 tablet (has no administration in time range)   levETIRAcetam 100 mg/mL solution 500 mg (has no administration in time range)   simvastatin tablet 20 mg (has no administration in time range)   0.9%  NaCl infusion (has no administration in time range)   polyethylene glycol packet 17 g (has no administration in time range)   acetaminophen tablet 650 mg (has no administration in time range)   0.9%  NaCl infusion (for blood administration) (has no administration in time range)   pantoprazole injection 40 mg (has no administration in time range)   piperacillin-tazobactam 4.5 g in dextrose 5 % 100 mL IVPB (ready to mix system) (has no administration in time range)   sucralfate 100 mg/mL suspension 1 g (has no administration in time range)       Current Discharge Medication List                  Scribe Attestation:   Scribe #1: I performed the above scribed service and the documentation accurately describes the services I performed. I attest to the accuracy of the note.     Attending:   Physician Attestation Statement for Scribe #1: I,  Trang Strickland MD, personally performed the services described in this documentation, as scribed by Josue Sotomayor, in my presence, and it is both accurate and complete.           Clinical Impression       ICD-10-CM ICD-9-CM   1. Delirium R41.0 780.09   2. Shock R57.9 785.50   3. Ileus K56.7 560.1   4. Fecal impaction K56.41 560.32   5. Parkinson disease G20 332.0   6. Anemia due to GI blood loss D50.0 280.0   7. Lactic acidosis E87.2 276.2   8. SEBASTIAN (acute kidney injury) N17.9 584.9       Patient disimpacted from the rectal examination.  Stool was brown and no active bleeding noted.  CT scan showed large bowel interposed between liver and the diaphragm which may have looked like a free air under the diaphragm on plain films.  Patient also has a higher creatinine from 0.8 up to 1.3 consistent with acute kidney injury with dehydration.  Plan to do blood transfusion observed in the hospital and follow-up.  High white count constitute suspicion for sepsis.  Clinically patient's high lactic acid was not due to sepsis but was rather due to hypotensive episode.  Patient's Hytrin was stopped at the time of admission.  Please make sure patient does not go home/nursing home with any blood pressure medications at this time due to recurrent hypotension and shock situation.    Disposition:   Disposition: Placed in Observation  Condition: Jessica Strickland MD  03/07/20 2623

## 2020-03-07 NOTE — DISCHARGE SUMMARY
[facility] Hospital Medicine  Discharge Summary      Patient Name: Adebayo Oneil  MRN: [id number]  Admission Date: 3/4/2020  Hospital Length of Stay: 2 days  Discharge Date and Time: 3/6/2020  6:01 PM  Attending Physician: Luiz Cary MD  Discharging Provider: Maxi Haskins NP  Primary Care Provider: Russ Lawrencensystem      HPI:   Adebayo Oneil is a 69 year old male with Parkinson disease, hypertension and anxiety who [facility] [place] and presented to the ED with reports of altered mentation upon standing this morning. The patient complains of right shoulder pain, but is unable to provide any additional history due to a baseline of disorientation. Upon review of his records sent by the nursing [place], the patient is currently receiving Augmentin for a UTI and he had a BMP of 1/15/20 which showed a BUN of 25, creatinine of 0.88. In Care Everywhere, on 9/15/19, he had a hemoglobin of 12.8, BUN of 31 and creatinine of 1.52. Today, he was found to have a hemoglobin of 5.5, creatinine of 0.9 and BUN of 72, total protein of 5, albumin of 2.3 and lactic acid of 2.4. His code status is noted to be DNR and Danni Howell is his power of .     Procedure(s) (LRB):  EGD (ESOPHAGOGASTRODUODENOSCOPY) (N/A)      Hospital Course:   Mr Oneil is a 69 year old male who presented to [facility] with altered mentation upon standing morning the morning of admission. History of Parkinson disease, he was noted to have HGB of 5.5. Patient was transfused 2 unit of PRBC. GI consulted and patient underwent EGD that afternoon. HGB continued to to be on the low side he was transferred another unit of PRBC before procedure. EGD results showed one non-bleeding cratered gastric ulcer with adherent clot was found on the greater curvature of the gastric body. The next day H & H results remain stable, no sign of bleeding noted, vital signs and labs stable. Case reviewed with GI, ok to discharge  for there standpoint. He will continue Protonix 40 mg PO BID, GI to arrange EGD in 2 weeks to evaluate response to therapy. Patient was seen, examined and deemed suitable for discharge.      Consults:   Consults (From admission, onward)        Status Ordering Provider     Inpatient consult to Gastroenterology  Once     Provider:  Dane Ashraf III, MD    Completed DELORIS HERNANDEZ     Inpatient consult to Registered Dietitian/Nutritionist  Once     Provider:  (Not yet assigned)    ESTELA Black          No new Assessment & Plan notes have been filed under this hospital service since the last note was generated.  Service: Hospital Medicine    Final Active Diagnoses:    Diagnosis Date Noted POA    Parkinson disease [G20]  Yes    Other seizures [G40.89]  Yes    Hyperlipidemia, mixed [E78.2]  Yes    Essential hypertension [I10]  Yes      Problems Resolved During this Admission:    Diagnosis Date Noted Date Resolved POA    PRINCIPAL PROBLEM:  Upper GI bleed [K92.2] 03/04/2020 03/06/2020 Yes    Symptomatic anemia [D64.9] 03/04/2020 03/06/2020 Yes    Lactic acidosis [E87.2] 03/04/2020 03/06/2020 Yes       Discharged Condition: stable    Disposition: Long Term Care    Follow Up:  Follow-up Information     [facility]  In 2 weeks.    Specialty:  Emergency Medicine  Why:  Follow up EGD to evaluate response to therapy and treatment   Contact information:  [address] [place] [id number] [phone]           [facility].    Why:  Nursing Home  Contact information:  [address] [place] [address] [phone]               Patient Instructions:      Diet Adult Regular     Notify your health care provider if you experience any of the following:  severe uncontrolled pain     Notify your health care provider if you experience any of the following:  difficulty breathing or increased cough       Significant Diagnostic Studies: Labs:   CMP    Recent Labs   Lab 03/05/20  0507 03/06/20  0523   * 144   K 4.7 3.9   * 116*   CO2 22* 23   * 92   BUN 60* 45*   CREATININE 0.8 1.0   CALCIUM 7.3* 7.8*   ANIONGAP 9 5*   ESTGFRAFRICA >60 >60   EGFRNONAA >60 >60    and CBC   Recent Labs   Lab 03/05/20  0507 03/05/20  0828 03/06/20  0523   WBC 13.78* 13.89* 8.11   HGB 7.6* 7.3* 7.7*   HCT 23.5* 22.9* 25.0*    199 202       Pending Diagnostic Studies:     None         Medications:  Reconciled Home Medications:      Medication List      START taking these medications    pantoprazole 40 MG tablet  Commonly known as:  PROTONIX  Take 1 tablet (40 mg total) by mouth 2 (two) times daily.        CONTINUE taking these medications    acetaminophen 650 MG Tbsr  Commonly known as:  TYLENOL  Take 650 mg by mouth every 6 (six) hours as needed.     aspirin 81 MG EC tablet  Commonly known as:  ECOTRIN  aspirin 81 mg tablet,delayed release   Direction: 1 tablet; 81 MG; Once a day;  Dispense Unit: Tablet Delayed Release;  Dose Route: Orally  Date Received : 06/03/2016     CALCIUM CARBONATE ORAL  Take 500 mg by mouth once daily.     carbidopa-levodopa  mg  mg per tablet  Commonly known as:  SINEMET  Take 1 tablet by mouth 3 (three) times daily.     cholecalciferol (vitamin D3) 125 mcg (5,000 unit) Tab  Take 5,000 Units by mouth.     cyanocobalamin 1,000 mcg/mL injection  Inject 1,000 mcg into the muscle.     levETIRAcetam 100 mg/mL Soln  Commonly known as:  KEPPRA  Take 500 mg by mouth 2 (two) times daily.     potassium chloride SA 20 MEQ tablet  Commonly known as:  K-DUR,KLOR-CON  Take 20 mEq by mouth.     terazosin 2 MG capsule  Commonly known as:  HYTRIN  Take 2 mg by mouth every evening.     Zocor 20 MG tablet  Generic drug:  simvastatin  Take 20 mg by mouth every evening.        STOP taking these medications    calcium carbonate-vit D3-min 600 mg calcium- 400 unit Tab            Indwelling Lines/Drains at time of discharge:    Lines/Drains/Airways     None                 Time spent on the discharge of patient: 40 minutes  Patient was seen and examined on the date of discharge and determined to be suitable for discharge.         Maxi Haskins NP  Department of Hospital Medicine  [facility]

## 2020-03-07 NOTE — Clinical Note
130 ml injected throughout the case. 170 mL total wasted during the case. 300 mL total used in the case.

## 2020-03-08 ENCOUNTER — ANESTHESIA (OUTPATIENT)
Dept: ENDOSCOPY | Facility: HOSPITAL | Age: 70
DRG: 356 | End: 2020-03-08
Payer: MEDICARE

## 2020-03-08 ENCOUNTER — ANESTHESIA EVENT (OUTPATIENT)
Dept: ENDOSCOPY | Facility: HOSPITAL | Age: 70
DRG: 356 | End: 2020-03-08
Payer: MEDICARE

## 2020-03-08 PROBLEM — D62 ACUTE BLOOD LOSS ANEMIA: Status: ACTIVE | Noted: 2020-03-04

## 2020-03-08 PROBLEM — R57.9 SHOCK CIRCULATORY: Status: ACTIVE | Noted: 2020-03-08

## 2020-03-08 PROBLEM — Z99.11 ON MECHANICALLY ASSISTED VENTILATION: Status: ACTIVE | Noted: 2020-03-08

## 2020-03-08 PROBLEM — F41.1 GAD (GENERALIZED ANXIETY DISORDER): Chronic | Status: ACTIVE | Noted: 2020-03-08

## 2020-03-08 PROBLEM — K56.41 FECAL IMPACTION OF COLON: Status: ACTIVE | Noted: 2020-03-08

## 2020-03-08 PROBLEM — K92.2 ACUTE UPPER GI BLEED: Status: ACTIVE | Noted: 2020-03-08

## 2020-03-08 LAB
ALLENS TEST: ABNORMAL
ANION GAP SERPL CALC-SCNC: 13 MMOL/L (ref 8–16)
ANION GAP SERPL CALC-SCNC: 9 MMOL/L (ref 8–16)
BASOPHILS # BLD AUTO: 0.02 K/UL (ref 0–0.2)
BASOPHILS NFR BLD: 0.2 % (ref 0–1.9)
BILIRUB UR QL STRIP: NEGATIVE
BLD PROD TYP BPU: NORMAL
BLOOD UNIT EXPIRATION DATE: NORMAL
BLOOD UNIT TYPE CODE: 5100
BLOOD UNIT TYPE CODE: 6200
BLOOD UNIT TYPE CODE: NORMAL
BLOOD UNIT TYPE: NORMAL
BUN SERPL-MCNC: 50 MG/DL (ref 8–23)
BUN SERPL-MCNC: 62 MG/DL (ref 8–23)
CALCIUM SERPL-MCNC: 6.6 MG/DL (ref 8.7–10.5)
CALCIUM SERPL-MCNC: 7.7 MG/DL (ref 8.7–10.5)
CHLORIDE SERPL-SCNC: 114 MMOL/L (ref 95–110)
CHLORIDE SERPL-SCNC: 115 MMOL/L (ref 95–110)
CLARITY UR: CLEAR
CO2 SERPL-SCNC: 20 MMOL/L (ref 23–29)
CO2 SERPL-SCNC: 20 MMOL/L (ref 23–29)
CODING SYSTEM: NORMAL
COLOR UR: YELLOW
CREAT SERPL-MCNC: 1 MG/DL (ref 0.5–1.4)
CREAT SERPL-MCNC: 1.1 MG/DL (ref 0.5–1.4)
DELSYS: ABNORMAL
DIFFERENTIAL METHOD: ABNORMAL
DISPENSE STATUS: NORMAL
EOSINOPHIL # BLD AUTO: 0 K/UL (ref 0–0.5)
EOSINOPHIL NFR BLD: 0.1 % (ref 0–8)
ERYTHROCYTE [DISTWIDTH] IN BLOOD BY AUTOMATED COUNT: 16.4 % (ref 11.5–14.5)
ERYTHROCYTE [DISTWIDTH] IN BLOOD BY AUTOMATED COUNT: 16.7 % (ref 11.5–14.5)
ERYTHROCYTE [SEDIMENTATION RATE] IN BLOOD BY WESTERGREN METHOD: 16 MM/H
EST. GFR  (AFRICAN AMERICAN): >60 ML/MIN/1.73 M^2
EST. GFR  (AFRICAN AMERICAN): >60 ML/MIN/1.73 M^2
EST. GFR  (NON AFRICAN AMERICAN): >60 ML/MIN/1.73 M^2
EST. GFR  (NON AFRICAN AMERICAN): >60 ML/MIN/1.73 M^2
FIBRINOGEN PPP-MCNC: 245 MG/DL (ref 182–366)
FIO2: 50
GLUCOSE SERPL-MCNC: 111 MG/DL (ref 70–110)
GLUCOSE SERPL-MCNC: 182 MG/DL (ref 70–110)
GLUCOSE SERPL-MCNC: 186 MG/DL (ref 70–110)
GLUCOSE UR QL STRIP: NEGATIVE
HCO3 UR-SCNC: 23.4 MMOL/L (ref 24–28)
HCT VFR BLD AUTO: 16.9 % (ref 40–54)
HCT VFR BLD AUTO: 21.1 % (ref 40–54)
HCT VFR BLD AUTO: 21.7 % (ref 40–54)
HCT VFR BLD AUTO: 26 % (ref 40–54)
HCT VFR BLD AUTO: 29.2 % (ref 40–54)
HCT VFR BLD CALC: <15 %PCV (ref 36–54)
HGB BLD-MCNC: 10.4 G/DL (ref 14–18)
HGB BLD-MCNC: 6.1 G/DL (ref 14–18)
HGB BLD-MCNC: 6.6 G/DL (ref 14–18)
HGB BLD-MCNC: 7.3 G/DL (ref 14–18)
HGB BLD-MCNC: 8.8 G/DL (ref 14–18)
HGB BLD-MCNC: 9.3 G/DL (ref 14–18)
HGB UR QL STRIP: NEGATIVE
IMM GRANULOCYTES # BLD AUTO: 0.05 K/UL (ref 0–0.04)
IMM GRANULOCYTES NFR BLD AUTO: 0.4 % (ref 0–0.5)
IT: 0.8
KETONES UR QL STRIP: NEGATIVE
LACTATE SERPL-SCNC: 2.7 MMOL/L (ref 0.5–2.2)
LEUKOCYTE ESTERASE UR QL STRIP: NEGATIVE
LYMPHOCYTES # BLD AUTO: 0.7 K/UL (ref 1–4.8)
LYMPHOCYTES NFR BLD: 5.9 % (ref 18–48)
MAGNESIUM SERPL-MCNC: 1.8 MG/DL (ref 1.6–2.6)
MCH RBC QN AUTO: 28.7 PG (ref 27–31)
MCH RBC QN AUTO: 30.7 PG (ref 27–31)
MCHC RBC AUTO-ENTMCNC: 31.3 G/DL (ref 32–36)
MCHC RBC AUTO-ENTMCNC: 33.6 G/DL (ref 32–36)
MCV RBC AUTO: 91 FL (ref 82–98)
MCV RBC AUTO: 92 FL (ref 82–98)
MODE: ABNORMAL
MONOCYTES # BLD AUTO: 1.2 K/UL (ref 0.3–1)
MONOCYTES NFR BLD: 10.5 % (ref 4–15)
NEUTROPHILS # BLD AUTO: 9.3 K/UL (ref 1.8–7.7)
NEUTROPHILS NFR BLD: 82.9 % (ref 38–73)
NITRITE UR QL STRIP: NEGATIVE
NRBC BLD-RTO: 0 /100 WBC
NUM UNITS TRANS FFP: NORMAL
NUM UNITS TRANS FFP: NORMAL
NUM UNITS TRANS PACKED RBC: NORMAL
PCO2 BLDA: 34.4 MMHG (ref 35–45)
PEEP: 5
PH SMN: 7.44 [PH] (ref 7.35–7.45)
PH UR STRIP: 5 [PH] (ref 5–8)
PIP: 16
PLATELET # BLD AUTO: 197 K/UL (ref 150–350)
PLATELET # BLD AUTO: 203 K/UL (ref 150–350)
PMV BLD AUTO: 10.1 FL (ref 9.2–12.9)
PMV BLD AUTO: 10.5 FL (ref 9.2–12.9)
PO2 BLDA: 258 MMHG (ref 80–100)
POC BE: -1 MMOL/L
POC IONIZED CALCIUM: 1.01 MMOL/L (ref 1.06–1.42)
POC SATURATED O2: 100 % (ref 95–100)
POTASSIUM BLD-SCNC: 3.1 MMOL/L (ref 3.5–5.1)
POTASSIUM SERPL-SCNC: 3.4 MMOL/L (ref 3.5–5.1)
POTASSIUM SERPL-SCNC: 4.1 MMOL/L (ref 3.5–5.1)
PROT UR QL STRIP: NEGATIVE
RBC # BLD AUTO: 2.3 M/UL (ref 4.6–6.2)
RBC # BLD AUTO: 2.38 M/UL (ref 4.6–6.2)
SAMPLE: ABNORMAL
SITE: ABNORMAL
SODIUM BLD-SCNC: 147 MMOL/L (ref 136–145)
SODIUM SERPL-SCNC: 144 MMOL/L (ref 136–145)
SODIUM SERPL-SCNC: 147 MMOL/L (ref 136–145)
SP GR UR STRIP: 1.02 (ref 1–1.03)
TRANS PLATPHERESIS VOL PATIENT: NORMAL ML
UNIT NUMBER: NORMAL
URN SPEC COLLECT METH UR: NORMAL
UROBILINOGEN UR STRIP-ACNC: NEGATIVE EU/DL
WBC # BLD AUTO: 11.25 K/UL (ref 3.9–12.7)
WBC # BLD AUTO: 13.74 K/UL (ref 3.9–12.7)

## 2020-03-08 PROCEDURE — 63600175 PHARM REV CODE 636 W HCPCS: Performed by: RADIOLOGY

## 2020-03-08 PROCEDURE — 63600175 PHARM REV CODE 636 W HCPCS: Performed by: NURSE PRACTITIONER

## 2020-03-08 PROCEDURE — 43255 PR EGD, FLEX, W/CTRL BLEED, ANY METHOD: ICD-10-PCS | Mod: ,,, | Performed by: INTERNAL MEDICINE

## 2020-03-08 PROCEDURE — 85027 COMPLETE CBC AUTOMATED: CPT

## 2020-03-08 PROCEDURE — 85014 HEMATOCRIT: CPT | Mod: 91

## 2020-03-08 PROCEDURE — 37000009 HC ANESTHESIA EA ADD 15 MINS: Performed by: INTERNAL MEDICINE

## 2020-03-08 PROCEDURE — P9012 CRYOPRECIPITATE EACH UNIT: HCPCS

## 2020-03-08 PROCEDURE — 80048 BASIC METABOLIC PNL TOTAL CA: CPT

## 2020-03-08 PROCEDURE — 20000000 HC ICU ROOM

## 2020-03-08 PROCEDURE — 85025 COMPLETE CBC W/AUTO DIFF WBC: CPT

## 2020-03-08 PROCEDURE — 25000003 PHARM REV CODE 250: Performed by: FAMILY MEDICINE

## 2020-03-08 PROCEDURE — 99292 PR CRITICAL CARE, ADDL 30 MIN: ICD-10-PCS | Mod: 25,,, | Performed by: NURSE PRACTITIONER

## 2020-03-08 PROCEDURE — 85018 HEMOGLOBIN: CPT | Mod: 91

## 2020-03-08 PROCEDURE — C9113 INJ PANTOPRAZOLE SODIUM, VIA: HCPCS | Performed by: NURSE PRACTITIONER

## 2020-03-08 PROCEDURE — 85384 FIBRINOGEN ACTIVITY: CPT

## 2020-03-08 PROCEDURE — 83605 ASSAY OF LACTIC ACID: CPT

## 2020-03-08 PROCEDURE — C1769 GUIDE WIRE: HCPCS | Performed by: RADIOLOGY

## 2020-03-08 PROCEDURE — 25000003 PHARM REV CODE 250: Performed by: NURSE ANESTHETIST, CERTIFIED REGISTERED

## 2020-03-08 PROCEDURE — 27201012 HC FORCEPS, HOT/COLD, DISP: Performed by: INTERNAL MEDICINE

## 2020-03-08 PROCEDURE — 36556 INSERT NON-TUNNEL CV CATH: CPT | Mod: ,,, | Performed by: NURSE PRACTITIONER

## 2020-03-08 PROCEDURE — 36415 COLL VENOUS BLD VENIPUNCTURE: CPT

## 2020-03-08 PROCEDURE — 99291 CRITICAL CARE FIRST HOUR: CPT | Mod: 25,,, | Performed by: NURSE PRACTITIONER

## 2020-03-08 PROCEDURE — 83735 ASSAY OF MAGNESIUM: CPT

## 2020-03-08 PROCEDURE — 99233 SBSQ HOSP IP/OBS HIGH 50: CPT | Mod: ,,, | Performed by: INTERNAL MEDICINE

## 2020-03-08 PROCEDURE — 63600175 PHARM REV CODE 636 W HCPCS: Performed by: FAMILY MEDICINE

## 2020-03-08 PROCEDURE — 63600175 PHARM REV CODE 636 W HCPCS: Performed by: NURSE ANESTHETIST, CERTIFIED REGISTERED

## 2020-03-08 PROCEDURE — 27201423 OPTIME MED/SURG SUP & DEVICES STERILE SUPPLY: Performed by: RADIOLOGY

## 2020-03-08 PROCEDURE — C1887 CATHETER, GUIDING: HCPCS | Performed by: RADIOLOGY

## 2020-03-08 PROCEDURE — 25000003 PHARM REV CODE 250

## 2020-03-08 PROCEDURE — 96376 TX/PRO/DX INJ SAME DRUG ADON: CPT | Performed by: FAMILY MEDICINE

## 2020-03-08 PROCEDURE — 80048 BASIC METABOLIC PNL TOTAL CA: CPT | Mod: 91

## 2020-03-08 PROCEDURE — 36430 TRANSFUSION BLD/BLD COMPNT: CPT

## 2020-03-08 PROCEDURE — 27800903 OPTIME MED/SURG SUP & DEVICES OTHER IMPLANTS: Performed by: RADIOLOGY

## 2020-03-08 PROCEDURE — 94002 VENT MGMT INPAT INIT DAY: CPT

## 2020-03-08 PROCEDURE — P9016 RBC LEUKOCYTES REDUCED: HCPCS

## 2020-03-08 PROCEDURE — 99233 PR SUBSEQUENT HOSPITAL CARE,LEVL III: ICD-10-PCS | Mod: ,,, | Performed by: INTERNAL MEDICINE

## 2020-03-08 PROCEDURE — 99900035 HC TECH TIME PER 15 MIN (STAT)

## 2020-03-08 PROCEDURE — 99291 PR CRITICAL CARE, E/M 30-74 MINUTES: ICD-10-PCS | Mod: 25,,, | Performed by: NURSE PRACTITIONER

## 2020-03-08 PROCEDURE — 86965 POOLING BLOOD PLATELETS: CPT

## 2020-03-08 PROCEDURE — 25000003 PHARM REV CODE 250: Performed by: NURSE PRACTITIONER

## 2020-03-08 PROCEDURE — 81003 URINALYSIS AUTO W/O SCOPE: CPT

## 2020-03-08 PROCEDURE — P9017 PLASMA 1 DONOR FRZ W/IN 8 HR: HCPCS

## 2020-03-08 PROCEDURE — 43255 EGD CONTROL BLEEDING ANY: CPT | Mod: ,,, | Performed by: INTERNAL MEDICINE

## 2020-03-08 PROCEDURE — C1894 INTRO/SHEATH, NON-LASER: HCPCS | Performed by: RADIOLOGY

## 2020-03-08 PROCEDURE — 96366 THER/PROPH/DIAG IV INF ADDON: CPT | Performed by: FAMILY MEDICINE

## 2020-03-08 PROCEDURE — 37000008 HC ANESTHESIA 1ST 15 MINUTES: Performed by: INTERNAL MEDICINE

## 2020-03-08 PROCEDURE — 25000003 PHARM REV CODE 250: Performed by: RADIOLOGY

## 2020-03-08 PROCEDURE — 85018 HEMOGLOBIN: CPT

## 2020-03-08 PROCEDURE — 99292 CRITICAL CARE ADDL 30 MIN: CPT | Mod: 25,,, | Performed by: NURSE PRACTITIONER

## 2020-03-08 PROCEDURE — 36556 PR INSERT NON-TUNNEL CV CATH 5+ YRS OLD: ICD-10-PCS | Mod: ,,, | Performed by: NURSE PRACTITIONER

## 2020-03-08 PROCEDURE — P9035 PLATELET PHERES LEUKOREDUCED: HCPCS

## 2020-03-08 PROCEDURE — 43255 EGD CONTROL BLEEDING ANY: CPT | Performed by: INTERNAL MEDICINE

## 2020-03-08 DEVICE — AZUR
Type: IMPLANTABLE DEVICE | Site: ARTERIAL | Status: FUNCTIONAL
Brand: AZUR DETACHABLE HELICAL HYDROCOIL

## 2020-03-08 DEVICE — AZUR
Type: IMPLANTABLE DEVICE | Site: ARTERIAL | Status: FUNCTIONAL
Brand: AZUR CX DETACHABLE

## 2020-03-08 RX ORDER — PHENYLEPHRINE HCL IN 0.9% NACL 20MG/250ML
0.5 PLASTIC BAG, INJECTION (ML) INTRAVENOUS CONTINUOUS
Status: DISCONTINUED | OUTPATIENT
Start: 2020-03-08 | End: 2020-03-08

## 2020-03-08 RX ORDER — MORPHINE SULFATE 4 MG/ML
2 INJECTION, SOLUTION INTRAMUSCULAR; INTRAVENOUS EVERY 4 HOURS PRN
Status: DISCONTINUED | OUTPATIENT
Start: 2020-03-08 | End: 2020-03-10

## 2020-03-08 RX ORDER — PROPOFOL 10 MG/ML
VIAL (ML) INTRAVENOUS
Status: DISCONTINUED | OUTPATIENT
Start: 2020-03-08 | End: 2020-03-08

## 2020-03-08 RX ORDER — LIDOCAINE HYDROCHLORIDE 20 MG/ML
INJECTION, SOLUTION EPIDURAL; INFILTRATION; INTRACAUDAL; PERINEURAL
Status: COMPLETED | OUTPATIENT
Start: 2020-03-08 | End: 2020-03-08

## 2020-03-08 RX ORDER — SUCCINYLCHOLINE CHLORIDE 20 MG/ML
INJECTION INTRAMUSCULAR; INTRAVENOUS
Status: DISCONTINUED | OUTPATIENT
Start: 2020-03-08 | End: 2020-03-08

## 2020-03-08 RX ORDER — ROCURONIUM BROMIDE 10 MG/ML
INJECTION, SOLUTION INTRAVENOUS
Status: DISCONTINUED | OUTPATIENT
Start: 2020-03-08 | End: 2020-03-08

## 2020-03-08 RX ORDER — MIDAZOLAM HYDROCHLORIDE 1 MG/ML
INJECTION INTRAMUSCULAR; INTRAVENOUS
Status: DISCONTINUED | OUTPATIENT
Start: 2020-03-08 | End: 2020-03-08

## 2020-03-08 RX ORDER — PROPOFOL 10 MG/ML
15 INJECTION, EMULSION INTRAVENOUS CONTINUOUS
Status: DISCONTINUED | OUTPATIENT
Start: 2020-03-08 | End: 2020-03-09

## 2020-03-08 RX ORDER — PHENYLEPHRINE HYDROCHLORIDE 10 MG/ML
200 INJECTION INTRAVENOUS CONTINUOUS
Status: DISCONTINUED | OUTPATIENT
Start: 2020-03-08 | End: 2020-03-08

## 2020-03-08 RX ORDER — SIMVASTATIN 20 MG/1
20 TABLET, FILM COATED ORAL NIGHTLY
Status: DISCONTINUED | OUTPATIENT
Start: 2020-03-09 | End: 2020-03-08

## 2020-03-08 RX ORDER — PHENYLEPHRINE HYDROCHLORIDE 10 MG/ML
INJECTION INTRAVENOUS
Status: DISCONTINUED | OUTPATIENT
Start: 2020-03-08 | End: 2020-03-08

## 2020-03-08 RX ORDER — LEVETIRACETAM 5 MG/ML
500 INJECTION INTRAVASCULAR EVERY 12 HOURS
Status: DISCONTINUED | OUTPATIENT
Start: 2020-03-08 | End: 2020-03-11 | Stop reason: HOSPADM

## 2020-03-08 RX ORDER — NOREPINEPHRINE BITARTRATE/D5W 4MG/250ML
0.06 PLASTIC BAG, INJECTION (ML) INTRAVENOUS CONTINUOUS
Status: DISCONTINUED | OUTPATIENT
Start: 2020-03-08 | End: 2020-03-09

## 2020-03-08 RX ORDER — CARBIDOPA AND LEVODOPA 25; 100 MG/1; MG/1
1 TABLET ORAL 3 TIMES DAILY
Status: DISCONTINUED | OUTPATIENT
Start: 2020-03-08 | End: 2020-03-11 | Stop reason: HOSPADM

## 2020-03-08 RX ORDER — HYDROCODONE BITARTRATE AND ACETAMINOPHEN 500; 5 MG/1; MG/1
TABLET ORAL
Status: DISCONTINUED | OUTPATIENT
Start: 2020-03-08 | End: 2020-03-08

## 2020-03-08 RX ORDER — CHLORHEXIDINE GLUCONATE ORAL RINSE 1.2 MG/ML
15 SOLUTION DENTAL 2 TIMES DAILY
Status: DISCONTINUED | OUTPATIENT
Start: 2020-03-08 | End: 2020-03-09

## 2020-03-08 RX ADMIN — PROPOFOL 20 MG: 10 INJECTION, EMULSION INTRAVENOUS at 09:03

## 2020-03-08 RX ADMIN — PROPOFOL 20 MG: 10 INJECTION, EMULSION INTRAVENOUS at 08:03

## 2020-03-08 RX ADMIN — CHLORHEXIDINE GLUCONATE 0.12% ORAL RINSE 15 ML: 1.2 LIQUID ORAL at 08:03

## 2020-03-08 RX ADMIN — PHENYLEPHRINE HYDROCHLORIDE 200 MCG: 10 INJECTION INTRAVENOUS at 10:03

## 2020-03-08 RX ADMIN — LEVETIRACETAM 500 MG: 5 INJECTION INTRAVENOUS at 02:03

## 2020-03-08 RX ADMIN — ROCURONIUM BROMIDE 25 MG: 10 INJECTION, SOLUTION INTRAVENOUS at 10:03

## 2020-03-08 RX ADMIN — Medication 0.02 MCG/KG/MIN: at 01:03

## 2020-03-08 RX ADMIN — PROPOFOL 15 MCG/KG/MIN: 10 INJECTION, EMULSION INTRAVENOUS at 02:03

## 2020-03-08 RX ADMIN — SODIUM CHLORIDE: 0.9 INJECTION, SOLUTION INTRAVENOUS at 02:03

## 2020-03-08 RX ADMIN — LEVETIRACETAM 500 MG: 5 INJECTION INTRAVENOUS at 08:03

## 2020-03-08 RX ADMIN — SODIUM CHLORIDE 250 ML: 0.9 INJECTION, SOLUTION INTRAVENOUS at 09:03

## 2020-03-08 RX ADMIN — DEXTROSE 8 MG/HR: 50 INJECTION, SOLUTION INTRAVENOUS at 05:03

## 2020-03-08 RX ADMIN — DEXTROSE 8 MG/HR: 50 INJECTION, SOLUTION INTRAVENOUS at 08:03

## 2020-03-08 RX ADMIN — SUCCINYLCHOLINE CHLORIDE 100 MG: 20 INJECTION, SOLUTION INTRAMUSCULAR; INTRAVENOUS at 09:03

## 2020-03-08 RX ADMIN — PHENYLEPHRINE HYDROCHLORIDE 200 MCG: 10 INJECTION INTRAVENOUS at 09:03

## 2020-03-08 RX ADMIN — SODIUM CHLORIDE 500 ML: 0.9 INJECTION, SOLUTION INTRAVENOUS at 09:03

## 2020-03-08 RX ADMIN — Medication 0.1 MCG/KG/MIN: at 11:03

## 2020-03-08 RX ADMIN — PIPERACILLIN AND TAZOBACTAM 4.5 G: 4; .5 INJECTION, POWDER, LYOPHILIZED, FOR SOLUTION INTRAVENOUS; PARENTERAL at 02:03

## 2020-03-08 RX ADMIN — PROPOFOL 40 MG: 10 INJECTION, EMULSION INTRAVENOUS at 08:03

## 2020-03-08 RX ADMIN — SUCRALFATE 1 G: 1 SUSPENSION ORAL at 06:03

## 2020-03-08 RX ADMIN — LIDOCAINE HYDROCHLORIDE 10 ML: 20 INJECTION, SOLUTION EPIDURAL; INFILTRATION; INTRACAUDAL; PERINEURAL at 10:03

## 2020-03-08 RX ADMIN — MIDAZOLAM HYDROCHLORIDE 2 MG: 1 INJECTION INTRAMUSCULAR; INTRAVENOUS at 10:03

## 2020-03-08 RX ADMIN — PIPERACILLIN AND TAZOBACTAM 4.5 G: 4; .5 INJECTION, POWDER, LYOPHILIZED, FOR SOLUTION INTRAVENOUS; PARENTERAL at 06:03

## 2020-03-08 RX ADMIN — CARBIDOPA AND LEVODOPA 1 TABLET: 25; 100 TABLET ORAL at 09:03

## 2020-03-08 NOTE — ANESTHESIA POSTPROCEDURE EVALUATION
Anesthesia Post Evaluation    Patient: Adebayo Oneil    Procedure(s) Performed: Procedure(s) (LRB):  EGD (ESOPHAGOGASTRODUODENOSCOPY) (N/A)    Final Anesthesia Type: general    Patient location during evaluation: Cath Lab  Patient participation: No - Unable to Participate, Intubation  Level of consciousness: sedated  Post-procedure vital signs: reviewed and stable  Pain management: adequate  Airway patency: patent    PONV status at discharge: No PONV  Anesthetic complications: no      Cardiovascular status: blood pressure returned to baseline  Respiratory status: intubated and ventilator  Hydration status: euvolemic  Follow-up not needed.          Vitals Value Taken Time   /64 3/8/2020 10:56 AM   Temp 36 °C (96.8 °F) 3/8/2020 10:56 AM   Pulse 94 3/8/2020 10:56 AM   Resp 18 3/8/2020 10:56 AM   SpO2 100 % 3/8/2020 10:56 AM         No case tracking events are documented in the log.      Pain/Jaclyn Score: No data recorded

## 2020-03-08 NOTE — NURSING
Pt admitted to tele room 209 from ED this shift via stretcher.  Pt is alert but only mumbles and nods head to questions.  ED nurse stated that the family would be present later and will be able to answer admit questions.  Tele monitor applied to pt and verified with monitor tech.   VSS.  Bed in lowest position. Side rails raised x2. Call bell within reach.

## 2020-03-08 NOTE — HOSPITAL COURSE
Patient was admitted for anemia secondary to upper GI bleed.  EGD was performed and bleeding gastric ulcers were noted.  Patient developed large bleed during EGD which required IR consult.  Angiography was performed and bleeding was successfully stopped.  Patient required large transfusion protocol during stay.  H&H remained stable post transfusion. PPI was started and sucralfate was added.  Recommendations were made to continue PPI therapy b.i.d. for at least weeks and sucralfate q.i.d. for least 1 week.  Liquid solution of sucralfate was ordered and discussed with caretaker at if sucralfate tablets were to be used recommended dissolving tablets in water.  Of note patient's mental status was a concern during stay.  Altered mental status workup was initiated however patient's mentation returned quickly to baseline. Patient was  discharged to [facility].

## 2020-03-08 NOTE — NURSING
Monitor 8623 applied and verified. Vital signs obtained. Admit questions answered by SUAD Razo. Will continue to monitor.

## 2020-03-08 NOTE — OR NURSING
Dr Jiménez IR here speaking with Dr Mast; 2nd unit of Blood hung per anesthesia; see anesthesia for blood documentation

## 2020-03-08 NOTE — CONSULTS
[facility]  Critical Care Medicine  Consult Note    Patient Name: Adebayo Oneil  MRN: [id number]  Admission Date: 3/7/2020  Hospital Length of Stay: 0 days  Code Status: DNR  Attending Physician: Joao Cano MD   Primary Care Provider: Provider Notinsystem   Principal Problem: Acute upper GI bleed      Subjective:     HPI:  Mr Oneil is a elderly 68 yo WM with a PMH of AAA, ELDA, chronic constipation, HTN, GERD/Esophagitis, HLD, Seizure, Parkinon's disease and UC.  He is a resident at [facility] and DNR.  He was recently discharged on 3 days ago post admit for Upper GI bleed and ABL anemia undergoing EGD on 3/5 revealing non bleeding gastric ulcer with adherent clot.  He received 3 units PRBCs at that time and on day of discharge H/H 7.7/25 and home with PPI.  He returned to ED 3/7 via EMS about 1300 hr here at [facility] after being found unresponsive about 0930 hr yesterday morning.  Upon EMS arrival he was awake but lethargic and confused with mumbled speech and BP 75/43.  In ED BP 89/66, WBC 23, LA 3.7.  CT head unremarkable for acute process.  Also had CT chest/Abd/Pelvis revealing elevated right HD, RLL atelectasis and high grade impaction.  BP improved with IVFs and PRBCs and admitted with GI consult.  He received 2 units PRBCs yesterday afternoon.  Yesterday evening about 2145 hr had large clot hematemesis started on PPI and given another 2 units PRBCs.  This AM taken to Endo per GI undergoing EGD.  EGD this morning: The duodenal bulb and second portion of the duodenum were normal.       Two oozing cratered gastric ulcers with visible vessels in each        ulcer were found in the gastric fundus. The smaller ulcer was 20mm        in size and a adherent clot was appreciated. Clot removal with        forceps was attempted and successful. This ulcer was injected with        6cc of 1:10,000 solution of epinephrine and then treated with        thermal bipolar therapy. hemostasis was  achieved for the smaller        ulcer. The largest ulcer was 30 mm in largest dimension with two        large visible vessels. Area was unsuccessfully injected with 10 mL        of a 1:10,000 solution of epinephrine for hemostasis. Coagulation        for hemostasis using bipolar probe was unsuccessful. The ulcer's        vessel began to actively spurt with failed endoscopic attempts. The        stomach began to fill with bright red blood. Procedure was aborted        and anesthesia electively intubated the patient for airway control.        Two units of pRBCs were administered during this procedure.       No gross lesions were noted in the entire esophagus.  IR consulted and taken to cath lab revealing: Active bleed off short gastric artery off supperior trunk of the splenic artery which was embolized to stasis with coils and particles.  He received 4 more units PRBCs between Endo and IR per report as well as 1 Plt and FFP X 2.  Admitted from IR to ICU.       Hospital/ICU Course:  3/8 - Arrived to ICU supine and unresponsive intubated on mech ventilation still receiving FFP.  Right groin arterial sheath still in place and VSS on IVFs and Kaleb infusion.  He had OG replaced revealing blood and ABG w/ lytes revealed POCT Hct < 15.  3 more units PRBCs ordered emergently with Cryo and Kaleb changed to Levophed due to mild bradycardia.  Left femoral CL placed emergently and transducer connected to right femoral sheath for continuous arterial pressure monitoring.  Dr. Cano at bedside and case discussed in detail.      Past Medical History:   Diagnosis Date    Abdominal aortic aneurysm     Acute bronchospasm     Anxiety disorder due to known physiological condition     Atherosclerosis     Candidiasis of skin and nail     Chronic constipation     Conduct disorder, unspecified     Deficiency of other specified B group vitamins     Dehydration     Essential hypertension     Extended spectrum beta lactamase (ESBL)  resistance     Generalized anxiety disorder     GERD with esophagitis     Hyperlipidemia, mixed     Other seasonal allergic rhinitis     Other seizures     Parkinson disease     Seizure     Ulcerative colitis     Vitamin D deficiency        Past Surgical History:   Procedure Laterality Date    ESOPHAGOGASTRODUODENOSCOPY N/A 3/5/2020    Procedure: EGD (ESOPHAGOGASTRODUODENOSCOPY);  Surgeon: Dane Ashraf III, MD;  Location: [facility];  Service: Endoscopy;  Laterality: N/A;       Review of patient's allergies indicates:  No Known Allergies    Family History     None        Tobacco Use    Smoking status: Unknown If Ever Smoked   Substance and Sexual Activity    Alcohol use: Not on file    Drug use: Never    Sexual activity: Not Currently         Review of Systems   Unable to perform ROS: Intubated     Objective:     Vital Signs (Most Recent):  Temp: 97.9 °F (36.6 °C) (03/08/20 1315)  Pulse: 81 (03/08/20 1345)  Resp: 18 (03/08/20 1345)  BP: (!) 114/57 (03/08/20 1345)  SpO2: 100 % (03/08/20 1345) Vital Signs (24h Range):  Temp:  [96.8 °F (36 °C)-99 °F (37.2 °C)] 97.9 °F (36.6 °C)  Pulse:  [] 81  Resp:  [15-19] 18  SpO2:  [93 %-100 %] 100 %  BP: ()/() 114/57     Weight: 65 kg (143 lb 4.8 oz)  Body mass index is 20.56 kg/m².      Intake/Output Summary (Last 24 hours) at 3/8/2020 1357  Last data filed at 3/8/2020 1100  Gross per 24 hour   Intake 3058 ml   Output 2000 ml   Net 1058 ml       Physical Exam   Constitutional: He appears well-developed and well-nourished.  Non-toxic appearance. He has a sickly appearance. He appears ill. No distress. He is intubated and restrained.   HENT:   Head: Normocephalic and atraumatic.   Mouth/Throat: Oropharynx is clear and moist and mucous membranes are normal.   Eyes: Pupils are equal, round, and reactive to light. Lids are normal.   Neck: Trachea normal. Neck supple. Carotid bruit is not present.   Cardiovascular: Normal rate and regular rhythm. Exam  reveals distant heart sounds.   Pulses:       Radial pulses are 1+ on the right side, and 1+ on the left side.        Dorsalis pedis pulses are 1+ on the right side, and 1+ on the left side.   Pulmonary/Chest: Effort normal and breath sounds normal. No accessory muscle usage. No tachypnea. He is intubated. No respiratory distress.   Abdominal: Soft. Normal appearance. He exhibits no distension. Bowel sounds are absent. There is no tenderness.   Genitourinary: Penis normal.   Genitourinary Comments: Adhikari in place   Musculoskeletal:        Right foot: There is no deformity.        Left foot: There is no deformity.   No edema   Lymphadenopathy:     He has no cervical adenopathy.     He has no axillary adenopathy.   Neurological: He is unresponsive.   PERRLA.  No corneal reflex and no withdrawal to pain.  Weak gag reflex.  No spont or purposeful movements.  No seizure activity   Skin: Skin is warm and dry. Capillary refill takes 2 to 3 seconds. No rash noted. No cyanosis.        Pale       Vents:  Vent Mode: A/C (03/08/20 1220)  Ventilator Initiated: Yes (03/08/20 1037)  Set Rate: 16 BPM (03/08/20 1220)  Vt Set: 0 mL (03/08/20 1220)  Pressure Support: 0 cmH20 (03/08/20 1220)  PEEP/CPAP: 8 cmH20 (03/08/20 1220)  Oxygen Concentration (%): 35 (03/08/20 1244)  Peak Airway Pressure: 24 cmH2O (03/08/20 1220)  Plateau Pressure: 0 cmH20 (03/08/20 1220)  Total Ve: 9.26 mL (03/08/20 1220)  F/VT Ratio<105 (RSBI): (!) 27.59 (03/08/20 1220)    Lines/Drains/Airways     Central Venous Catheter Line            Percutaneous Central Line Insertion/Assessment - Triple Lumen  03/08/20 left femoral vein less than 1 day          Drain                 NG/OG Tube 03/08/20 less than 1 day         Sheath 03/08/20 Right less than 1 day         Urethral Catheter 03/08/20 less than 1 day          Airway                 Airway - Non-Surgical 03/08/20 0944 less than 1 day          Peripheral Intravenous Line                 Peripheral IV - Single  Lumen 03/07/20 1308 18 G Left Antecubital less than 1 day         Peripheral IV - Single Lumen 03/07/20 1350 20 G Right Forearm less than 1 day         Peripheral IV - Single Lumen 03/08/20 0918 18 G Left Wrist less than 1 day                Significant Labs:    CBC/Anemia Profile:  Recent Labs   Lab 03/07/20  1251  03/08/20  0746 03/08/20  1128 03/08/20  1258   WBC 23.34*  --  11.25 13.74*  --    HGB 6.6*   < > 6.6* 7.3* 6.1*   HCT 21.3*   < > 21.1* 21.7* 16.9*     --  203 197  --    MCV 91  --  92 91  --    RDW 16.7*  --  16.7* 16.4*  --     < > = values in this interval not displayed.        Chemistries:  Recent Labs   Lab 03/07/20  1251 03/08/20  0413   * 147*   K 4.4 4.1   * 114*   CO2 18* 20*   BUN 59* 62*   CREATININE 1.3 1.1   CALCIUM 8.0* 7.7*   ALBUMIN 2.4*  --    PROT 5.2*  --    BILITOT 0.2  --    ALKPHOS 81  --    ALT 15  --    AST 16  --      Coagulation:   Recent Labs   Lab 03/07/20  1251   INR 1.0     Lactic Acid:   Recent Labs   Lab 03/07/20  1333 03/07/20  1902   LACTATE 3.7* 2.2     All pertinent labs within the past 24 hours have been reviewed.    Significant Imaging:   CT: I have reviewed all pertinent results/findings within the past 24 hours and my personal findings are:  chest/abd/pelvis: elevated right HD and RLL atelectasis, high grade impaction    Assessment/Plan:     Neuro  Other seizures  Cont Keppra IVPB    Parkinson disease  Cont Sinemet    Psychiatric  ELDA (generalized anxiety disorder)  PRN Ativan    Pulmonary  On mechanically assisted ventilation  Cont full vent support   SAT/SBT once hemodynamically stable and alert  VAP prophylaxis  Vent settings reviewed and adjusted    Oncology  Acute blood loss anemia  Massive transfusion protocol  Has had 8 units PRBCs since yesterday and 11 units PRBCs last 4 days  Received FFP and Plts  Giving another 3 units PRBCs and Cryo now  H/H Q 4 hours  Monitor closely for bleeding and follow coags    GI  * Acute upper GI bleed of  gastric artery  S/P coiling of gastric artery per IR post fail attempt at Epi inj via EGD  H/H Q 4 hours  Monitor for bleeding  If family wishes to continue aggressive Rx and H/H does not stabilize and/or continued GI bleeding will need surgery consult  PPI infusion, blood products, IVFs    Fecal impaction of colon  Mg Citrate per OG once more stable    Other  Shock circulatory  Blood products and IVFs  Titrate Levophed infusion for MAP > 65  ICU hemodynamic monitoring       Preventive Measures and Monitoring:   Stress Ulcer: Pepcid  Nutrition: NPO  Glucose control: stable  Bowel prophylaxis: Mg Citrate once stable  DVT prophylaxis: SCDs  Hx CAD on B-Blocker: no hx CAD  Head of Bed/Reposition: Elevate HOB and turn Q1-2 hours   Early Mobility: bed rest  SAT/SBT: once more stable  Vent Day: #1  OG Day: #1  Central Line Left Femoral Day: #1  Right Femoral Arterial Sheath Day: #1  Adhikari Day: #1  IVAB Day: #2  Code Status: DNR  Pneumonia Vaccine: assume UTD at NH  Flu Vaccine: assume UTD at NH    Counseling/Consultation:I have discussed the care of this patient in detail with the bedside nursing staff and Dr. Bishop and Dr. Cano    Critical Care Time: 90 minutes  Critical secondary to Patient has a condition that poses threat to life and bodily function: Shock, massive GI bleed and intubated on mech ventilation  Patient is currently on drug therapy requiring intensive monitoring for toxicity: Levophed infusion  Patient is currently receiving parenteral controlled substances: Ativan and Morphine     Critical care was time spent personally by me on the following activities: development of treatment plan with patient or surrogate and bedside caregivers, discussions with consultants, evaluation of patient's response to treatment, examination of patient, ordering and performing treatments and interventions, ordering and review of laboratory studies, ordering and review of radiographic studies, pulse oximetry, re-evaluation of  "patient's condition. This critical care time did not overlap with that of any other provider or involve time for any procedures.    Patient assessed.  Soft bilat wrist restraints ordered due to risk of pulling lines, tubes and/or climbing OOB.    Advance Care Planning   Had conference at bedside with SUAD Baires, and discussed patient's current condition in detail with all questions answered.  Danni claims patient would not want any long term support and [facility]" then he would want comfort care only with palliative extubation.  Also discussed if patient worsens or continues to have GI bleeding and Danni states patient would not want emergent surgery or any surgical intervention therefore will not consult surgery.  Danni wishes to leave DNR status in event patient worsens or suffers Cardiac arrest.    CECE Gonzalez [facility]       Thank you for your consult. I will follow-up with patient. Please contact us if you have any additional questions.     Mickey Gonzalez NP  Critical Care Medicine  [facility]  "

## 2020-03-08 NOTE — PROGRESS NOTES
"[facility] Hospital Medicine  Progress Note    Patient Name: Adebayo Oneil  MRN: [id number]  Patient Class: IP- Inpatient   Admission Date: 3/7/2020  Length of Stay: 0 days  Attending Physician: Joao Cano MD  Primary Care Provider: Provider Not[employer]ystem        Subjective:     Principal Problem:Acute upper GI bleed        HPI:  Adebayo Oneil is a 69 y.o. 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 Pt was unresponsive at 9:30 this morning and [facility] was called. Upon AMS arrival pt was responsive but mumbled his speech and was "more confused" than baseline. Pt was discharged yesterday with hytrin. [facility] states that pt's blood pressure was 75/43 upon arrival and 80/35 en route. Associated sxs include confusion.   In the ED, pt was initial hypotensive, WBCs 23.34,  hemoglobin of 6.6, creatinine of 1.3 and BUN of 59, albumin of 2.4 and lactic acid of 3.7. CT head negative for acute findings. CT abdomen showed prominent elevation of the right diaphragm with compressive atelectasis right lung base. Aneurysmal dilatation of the aortic root measuring 4 cm. Constipation with little high-grade rectal fecal impaction. Abdominal aortic ectasia with maximum luminal diameter 3.1 cm. His code status is noted to be DNR and Danni Howell is his power of .   He will be kept on OBS for lactic acidosis under the care of Hospital Medicine    Overview/Hospital Course:  3/8:  Patient underwent EGD by GI this a.m. bleeding ulcers noted.  She acute bleeding noted during procedure which required intervention " radiology consult.  Angiography was performed and bleeding was stopped with coils and particles.  Patient was intubated during procedure.  Rapid transfusion protocol was initiated due to severity of blood loss.  Patient was transferred to ICU.    Interval History:  EGD performed this morning.  Bleeding gastric ulcers noted ulcer was injected with epinephrine the largest ulcer was unsuccessfully treated with epinephrine.  IR was consulted and angiography was performed.  Hemostasis was achieved.     Review of Systems   Unable to perform ROS: Intubated     Objective:     Vital Signs (Most Recent):  Temp: 97.7 °F (36.5 °C) (03/08/20 1420)  Pulse: 72 (03/08/20 1420)  Resp: 15 (03/08/20 1420)  BP: (!) 129/54 (03/08/20 1400)  SpO2: 100 % (03/08/20 1420) Vital Signs (24h Range):  Temp:  [96.8 °F (36 °C)-99 °F (37.2 °C)] 97.7 °F (36.5 °C)  Pulse:  [] 72  Resp:  [15-19] 15  SpO2:  [93 %-100 %] 100 %  BP: ()/() 129/54     Weight: 65 kg (143 lb 4.8 oz)  Body mass index is 20.56 kg/m².    Intake/Output Summary (Last 24 hours) at 3/8/2020 1428  Last data filed at 3/8/2020 1415  Gross per 24 hour   Intake 3358 ml   Output 2000 ml   Net 1358 ml      Physical Exam   Constitutional: No distress.   Chronically ill appearing    HENT:   Head: Normocephalic and atraumatic.   Cardiovascular: Normal rate, regular rhythm and normal heart sounds. Exam reveals no gallop and no friction rub.   No murmur heard.  Pulmonary/Chest: Effort normal and breath sounds normal. No stridor. No respiratory distress. He has no wheezes. He has no rales.   Abdominal: Soft. Bowel sounds are normal. He exhibits no distension and no mass. There is no tenderness. There is no guarding.   Musculoskeletal: He exhibits no edema.   Neurological:   Currently not responsive, not on sedation   Skin: He is not diaphoretic.       Significant Labs:   Recent Lab Results       03/08/20  1258   03/08/20  1128   03/08/20  0746   03/08/20  0450   03/08/20  0413         Unit Blood Type Code               Unit Expiration               Unit Blood Type               Anion Gap 9       13     Appearance, UA               Baso #     0.02         Basophil%     0.2         Bilirubin (UA)               Blood Culture, Routine               BUN, Bld 50       62     Calcium 6.6  Comment:  CA critical result(s) called and verbal readback obtained from Nuno Ling RN by TMW2 03/08/2020 14:29         7.7     Chloride 115       114     CO2 20       20     CODING SYSTEM               Color, UA               Creatinine 1.0       1.1     Differential Method     Automated         DISPENSE STATUS               eGFR if  >60       >60     eGFR if non  >60  Comment:  Calculation used to obtain the estimated glomerular filtration  rate (eGFR) is the CKD-EPI equation.          >60  Comment:  Calculation used to obtain the estimated glomerular filtration  rate (eGFR) is the CKD-EPI equation.        Eos #     0.0         Eosinophil%     0.1         Glucose 182       111     Glucose, UA               Gran # (ANC)     9.3         Gran%     82.9         Group & Rh               Hematocrit 16.9  Comment:  HCT critical result(s) called and verbal readback obtained from   SADIE NUNEZ RN by UZMAM 03/08/2020 13:23   21.7 21.1 26.0       Hemoglobin 6.1 7.3 6.6  Comment:  Results confirmed, test repeated 8.8       Immature Grans (Abs)     0.05  Comment:  Mild elevation in immature granulocytes is non specific and   can be seen in a variety of conditions including stress response,   acute inflammation, trauma and pregnancy. Correlation with other   laboratory and clinical findings is essential.           Immature Granulocytes     0.4         INDIRECT NAT               Ketones, UA               Lactate, Scott 2.7  Comment:  Falsely low lactic acid results can be found in samples   containing >=13.0 mg/dL total bilirubin and/or >=3.5 mg/dL   direct bilirubin.               Leukocytes,  UA               Lymph #     0.7         Lymph%     5.9         Magnesium 1.8             MCH   30.7 28.7         MCHC   33.6 31.3         MCV   91 92         Mono #     1.2         Mono%     10.5         MPV   10.5 10.1         NITRITE UA               nRBC     0         Occult Blood UA               pH, UA               Platelets   197 203         Potassium 3.4       4.1     Product Code               Protein, UA               RBC   2.38 2.30         RDW   16.4 16.7         Sodium 144       147     Specific [place], UA               Specimen UA               UNIT NUMBER               UROBILINOGEN UA               WBC   13.74 11.25                          03/07/20  2212   03/07/20  1902   03/07/20  1621   03/07/20  1357   03/07/20  1355        Unit Blood Type Code               Unit Expiration               Unit Blood Type               Anion Gap               Appearance, UA     Clear         Baso #               Basophil%               Bilirubin (UA)     Negative         Blood Culture, Routine       No Growth to date[P] No Growth to date[P]     BUN, Bld               Calcium               Chloride               CO2               CODING SYSTEM               Color, UA     Yellow         Creatinine               Differential Method               DISPENSE STATUS               eGFR if                eGFR if non                Eos #               Eosinophil%               Glucose               Glucose, UA     Negative         Gran # (ANC)               Gran%               Group & Rh               Hematocrit 22.3             Hemoglobin 6.9             Immature Grans (Abs)               Immature Granulocytes               INDIRECT NAT               Ketones, UA     Negative         Lactate, Scott   2.2  Comment:  Falsely low lactic acid results can be found in samples   containing >=13.0 mg/dL total bilirubin and/or >=3.5 mg/dL   direct bilirubin.             Leukocytes, UA     Negative          Lymph #               Lymph%               Magnesium               MCH               MCHC               MCV               Mono #               Mono%               MPV               NITRITE UA     Negative         nRBC               Occult Blood UA     Negative         pH, UA     5.0         Platelets               Potassium               Product Code               Protein, UA     Negative  Comment:  Recommend a 24 hour urine protein or a urine   protein/creatinine ratio if globulin induced proteinuria is  clinically suspected.           RBC               RDW               Sodium               Specific Gravity, UA     1.020         Specimen UA     Urine, Catheterized         UNIT NUMBER               UROBILINOGEN UA     Negative         WBC                                03/07/20  1333        Unit Blood Type Code E000[P]      6200[P]      6200[P]      6200[P]      6200[P]      6200[P]      5100[P]      6200[P]      6200[P]      6200[P]      6200[P]      6200      6200     Unit Expiration [id number][P]      202004062359[P]      202004062359[P]      202004062359[P]      [id number][P]      202003132359[P]      202003102359[P]      202004062359[P]      202004062359[P]      202004062359[P]      202004062359[P]      202004062359 202004062359     Unit Blood Type X POS[P]      A POS[P]      A POS[P]      A POS[P]      A POS[P]      A POS[P]      O POS[P]      A POS[P]      A POS[P]      A POS[P]      A POS[P]      A POS      A POS     Anion Gap       Appearance, UA       Baso #       Basophil%       Bilirubin (UA)       Blood Culture, Routine       BUN, Bld       Calcium       Chloride       CO2       CODING SYSTEM QMPC520[P]      KVVV876[P]      PPYN544[P]      SQOH528[P]      BKNO249[P]      JGNK628[P]      UIMB467[P]      MLIM358[P]      PXNF163[P]      UMGP558[P]      RHYI537[P]      KPTD036      IVFG430     Color, UA       Creatinine       Differential Method       DISPENSE STATUS CROSSMATCHED[P]       ISSUED[P]      CROSSMATCHED[P]      ISSUED[P]      ISSUED[P]      ISSUED[P]      ISSUED[P]      ISSUED[P]      ISSUED[P]      ISSUED[P]      ISSUED[P]      TRANSFUSED      TRANSFUSED     eGFR if        eGFR if non        Eos #       Eosinophil%       Glucose       Glucose, UA       Gran # (ANC)       Gran%       Group & Rh A POS     Hematocrit       Hemoglobin       Immature Grans (Abs)       Immature Granulocytes       INDIRECT NAT NEG     Ketones, UA       Lactate, Scott 3.7  Comment:  Falsely low lactic acid results can be found in samples   containing >=13.0 mg/dL total bilirubin and/or >=3.5 mg/dL   direct bilirubin.  LA  critical result(s) called and verbal readback obtained from MALDONADO WALTON RN by BRIDGER 03/07/2020 14:14       Leukocytes, UA       Lymph #       Lymph%       Magnesium       MCH       MCHC       MCV       Mono #       Mono%       MPV       NITRITE UA       nRBC       Occult Blood UA       pH, UA       Platelets       Potassium       Product Code M6698W21[P]      I5821J65[P]      L6421E67[P]      X9646P71[P]      Y2258J17[P]      P6567Y50[P]      Q4059V69[P]      K0059G60[P]      P4661E24[P]      G0899A14[P]      N3701T82[P]      X2537G18      A4241K10     Protein, UA       RBC       RDW       Sodium       Specific Gravity, UA       Specimen UA       UNIT NUMBER [id number][P]      [id number][P]      [id number][P]      X524407928597[P]      P436914792216[P]      S266186032548[P]      U108052757443[P]      J131901774130[P]      K579346337767[P]      R458881634720[P]      E590770145356[P]      [id number]      [id number]     UROBILINOGEN UA       WBC           All pertinent labs within the past 24 hours have been reviewed.    Significant Imaging: I have reviewed all pertinent imaging results/findings within the past 24 hours.      Assessment/Plan:      * Acute upper GI bleed of gastric artery  3/8:  Continue to transfuse packed RBCs as needed  Goal to  keep hemoglobin above 8  Continue to monitor H&H every 4-6 hours  IR was successful in cauterizing and bleed of short gastric arteries  GI successful in cauterizing and gastric ulcer bleed  Continue Protonix drip      On mechanically assisted ventilation  3/8:  Currently intubated  Was intubated during egd  Of note patient is DNR  Will plan wean vent as tolerated  Plan extubated soon as possible      Shock circulatory  3/8:  Currently on Levophed  Continue blood transfusions as indicated  Shock likely secondary to acute blood loss      Leukocytosis  --unsure of source  --IV zosyn empirically  --IVF's  --monitor    3/8:  Will continue Zosyn for now  Blood cultures pending  No source of infection  Plan to DC/de-escalate abx therapy tomorrow    Essential hypertension  Bp stable   Hold Hytrin   Monitor       Hyperlipidemia, mixed  Continue Statin       Other seizures  Continue Keppra.  Seizure precautions         Lactic acidosis  --IVF's  --repeat lactic pending  --monitor    3/8:  Lactic acid is still elevated  Likely secondary to drop in H&H along with hypotension  Will continue to monitor  Plan to repeat lactate acid      Acute blood loss anemia  Transfuse 2 units PRBCs.   -Monitor hemoglobin and hematocrit.         VTE Risk Mitigation (From admission, onward)         Ordered     Place sequential compression device  Until discontinued      03/08/20 1249     IP VTE HIGH RISK PATIENT  Once      03/08/20 1249     Place KISHORE hose  Until discontinued      03/07/20 1741                Critical care time spent on the evaluation and treatment of severe organ dysfunction, review of pertinent labs and imaging studies, discussions with consulting providers and discussions with patient/family: 40 minutes.      Joao Cano MD  Department of Hospital Medicine   [facility]

## 2020-03-08 NOTE — ASSESSMENT & PLAN NOTE
S/P coiling of gastric artery per IR post fail attempt at Epi inj via EGD  H/H Q 4 hours  Monitor for bleeding  If family wishes to continue aggressive Rx and H/H does not stabilize and/or continued GI bleeding will need surgery consult  PPI infusion, blood products, IVFs

## 2020-03-08 NOTE — OR NURSING
Await arrival of cath lab team; ambu 100% O2 per CRNA; see anesthesia for blood documentation and vital signs;

## 2020-03-08 NOTE — H&P
"[facility] Hospital Medicine  History & Physical    Patient Name: Adebayo Oneil  MRN: [id number]  Admission Date: 3/7/2020  Attending Physician: Trang Strickland MD   Primary Care Provider: Provider Not[employer]         Patient information was obtained from patient, past medical records and ER records.     Subjective:     Principal Problem:Lactic acidosis    Chief Complaint:   Chief Complaint   Patient presents with    Altered Mental Status     patient with sudden onset of unresponsive at 0930, upon [facility] arrival patient responsive but speech mummbled and " more confused"        HPI: Adebayo Oneil is a 69 y.o. 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 Pt was unresponsive at 9:30 this morning and [facility] was called. Upon [facility] arrival pt was responsive but mumbled his speech and was "more confused" than baseline. Pt was discharged yesterday with hytrin. [facility] states that pt's blood pressure was 75/43 upon arrival and 80/35 en route. Associated sxs include confusion.   In the ED, pt was initial hypotensive, WBCs 23.34,  hemoglobin of 6.6, creatinine of 1.3 and BUN of 59, albumin of 2.4 and lactic acid of 3.7. CT head negative for acute findings. CT abdomen showed prominent elevation of the right diaphragm with compressive atelectasis right lung base. Aneurysmal dilatation of the aortic root measuring 4 cm. Constipation with little high-grade rectal fecal impaction. Abdominal aortic ectasia with maximum luminal diameter 3.1 cm. His code status is noted to be DNR and Danni Howell is his " power of .   He will be kept on OBS for lactic acidosis under the care of Hospital Medicine    Past Medical History:   Diagnosis Date    Abdominal aortic aneurysm     Acute bronchospasm     Anxiety disorder due to known physiological condition     Atherosclerosis     Candidiasis of skin and nail     Chronic constipation     Conduct disorder, unspecified     Deficiency of other specified B group vitamins     Dehydration     Essential hypertension     Extended spectrum beta lactamase (ESBL) resistance     Generalized anxiety disorder     GERD with esophagitis     Hyperlipidemia, mixed     Other seasonal allergic rhinitis     Other seizures     Parkinson disease     Seizure     Ulcerative colitis     Vitamin D deficiency        Past Surgical History:   Procedure Laterality Date    ESOPHAGOGASTRODUODENOSCOPY N/A 3/5/2020    Procedure: EGD (ESOPHAGOGASTRODUODENOSCOPY);  Surgeon: Dane Ashraf III, MD;  Location: [facility];  Service: Endoscopy;  Laterality: N/A;       Review of patient's allergies indicates:  No Known Allergies    Current Facility-Administered Medications on File Prior to Encounter   Medication    [DISCONTINUED] 0.9%  NaCl infusion (for blood administration)    [DISCONTINUED] 0.9%  NaCl infusion (for blood administration)    [DISCONTINUED] 0.9%  NaCl infusion (for blood administration)    [DISCONTINUED] acetaminophen tablet 650 mg    [DISCONTINUED] carbidopa-levodopa  mg per tablet 1 tablet    [DISCONTINUED] hydrALAZINE injection 10 mg    [DISCONTINUED] HYDROcodone-acetaminophen 5-325 mg per tablet 1 tablet    [DISCONTINUED] levETIRAcetam 100 mg/mL solution 500 mg    [DISCONTINUED] ondansetron disintegrating tablet 8 mg    [DISCONTINUED] pantoprazole injection 40 mg    [DISCONTINUED] simvastatin tablet 20 mg    [DISCONTINUED] sodium chloride 0.9% flush 10 mL     Current Outpatient Medications on File Prior to Encounter   Medication Sig    acetaminophen  (TYLENOL) 650 MG TbSR Take 650 mg by mouth every 6 (six) hours as needed.    aspirin (ECOTRIN) 81 MG EC tablet aspirin 81 mg tablet,delayed release   Direction: 1 tablet; 81 MG; Once a day;  Dispense Unit: Tablet Delayed Release;  Dose Route: Orally  Date Received : 06/03/2016    carbidopa-levodopa  mg (SINEMET)  mg per tablet Take 1 tablet by mouth 3 (three) times daily.     cyanocobalamin 1,000 mcg/mL injection Inject 1,000 mcg into the muscle.     levETIRAcetam (KEPPRA) 100 mg/mL Soln Take 500 mg by mouth 2 (two) times daily.     pantoprazole (PROTONIX) 40 MG tablet Take 1 tablet (40 mg total) by mouth 2 (two) times daily.    potassium chloride SA (K-DUR,KLOR-CON) 20 MEQ tablet Take 20 mEq by mouth.    simvastatin (ZOCOR) 20 MG tablet Take 20 mg by mouth every evening.     cholecalciferol, vitamin D3, 125 mcg (5,000 unit) Tab Take 5,000 Units by mouth.    [DISCONTINUED] CALCIUM CARBONATE ORAL Take 500 mg by mouth once daily.    [DISCONTINUED] terazosin (HYTRIN) 2 MG capsule Take 2 mg by mouth every evening.      Family History     Reviewed and not pertinent        Tobacco Use    Smoking status: Unknown If Ever Smoked   Substance and Sexual Activity    Alcohol use: Not on file    Drug use: Never    Sexual activity: Not Currently     Review of Systems   Unable to perform ROS: Dementia     Objective:     Vital Signs (Most Recent):  Temp: 97.5 °F (36.4 °C) (03/07/20 1235)  Pulse: 79 (03/07/20 1515)  Resp: 19 (03/07/20 1515)  BP: 112/68 (03/07/20 1515)  SpO2: 100 % (03/07/20 1515) Vital Signs (24h Range):  Temp:  [97.5 °F (36.4 °C)] 97.5 °F (36.4 °C)  Pulse:  [76-85] 79  Resp:  [12-20] 19  SpO2:  [98 %-100 %] 100 %  BP: ()/(56-68) 112/68     Weight: 64.1 kg (141 lb 4.8 oz)  There is no height or weight on file to calculate BMI.    Physical Exam   Constitutional: He appears well-developed and well-nourished.   Frail    HENT:   Head: Normocephalic and atraumatic.   Eyes: Conjunctivae are  normal.   Neck: Neck supple. No JVD present.   Cardiovascular: Normal rate, regular rhythm and normal heart sounds.   Pulmonary/Chest: Effort normal and breath sounds normal. He has no wheezes.   Abdominal: Soft. Bowel sounds are normal. He exhibits no distension. There is no tenderness.   Musculoskeletal: Normal range of motion.   Bilateral upper and lower extremity contractures noted.    Neurological: He is alert.   Oriented to person. Disoriented to place and time.    Skin: Skin is warm and dry. No rash noted. There is pallor.   Bilateral lower extremities with scratches and bruising    Psychiatric: He has a normal mood and affect. His behavior is normal. Thought content normal.   Nursing note and vitals reviewed.          Significant Labs:   Blood Culture: No results for input(s): LABBLOO in the last 48 hours.  BMP:   Recent Labs   Lab 03/06/20 0523 03/07/20  1251   GLU 92 126*    146*   K 3.9 4.4   * 111*   CO2 23 18*   BUN 45* 59*   CREATININE 1.0 1.3   CALCIUM 7.8* 8.0*   MG 1.8  --      CBC:   Recent Labs   Lab 03/06/20  0523 03/07/20  1251   WBC 8.11 23.34*   HGB 7.7* 6.6*   HCT 25.0* 21.3*    274     CMP:   Recent Labs   Lab 03/06/20  0523 03/07/20  1251    146*   K 3.9 4.4   * 111*   CO2 23 18*   GLU 92 126*   BUN 45* 59*   CREATININE 1.0 1.3   CALCIUM 7.8* 8.0*   PROT  --  5.2*   ALBUMIN  --  2.4*   BILITOT  --  0.2   ALKPHOS  --  81   AST  --  16   ALT  --  15   ANIONGAP 5* 17*   EGFRNONAA >60 55*     Urine Studies:   Recent Labs   Lab 03/07/20  1621   COLORU Yellow   APPEARANCEUA Clear   PHUR 5.0   SPECGRAV 1.020   PROTEINUA Negative   GLUCUA Negative   KETONESU Negative   BILIRUBINUA Negative   OCCULTUA Negative   NITRITE Negative   UROBILINOGEN Negative   LEUKOCYTESUR Negative     All pertinent labs within the past 24 hours have been reviewed.    Significant Imaging:   Imaging Results          CT Chest Abdoment Pelvis Without Contrast (XPD) (Final result)  Result time  03/07/20 15:44:20    Final result by Adebayo Carter MD (03/07/20 15:44:20)                 Impression:      Prominent elevation of the right diaphragm with compressive atelectasis right lung base.  Nonspecific atelectasis left lung base.  Aneurysmal dilatation of the aortic root measuring 4 cm.    Constipation with little high-grade rectal fecal impaction.    Abdominal aortic ectasia with maximum luminal diameter 3.1 cm.      Electronically signed by: Adebayo Carter MD  Date:    03/07/2020  Time:    15:44             Narrative:    EXAMINATION:  CT CHEST ABDOMEN PELVIS WITHOUT CONTRAST(XPD)    CLINICAL HISTORY:  Abdominal aortic aneurysm.    TECHNIQUE:  Standard CT technique.  All CT scans at this facility are performed  using dose modulation techniques as appropriate to performed exam including the following:  automated exposure control; adjustment of mA and/or kV according to the patients size (this includes techniques or standardized protocols for targeted exams where dose is matched to indication/reason for exam: i.e. extremities or head);  iterative reconstruction technique.    COMPARISON:  None    FINDINGS:  CT chest: The cardiac size is normal.  Minor coronary artery calcification is present.  Mild aneurysmal dilatation of the ascending aorta is present measuring 4 cm in diameter.  There is prominent elevation of the left diaphragm with compressive type atelectasis of the right lung base.  Minor subpleural atelectasis left lower lobe.  No pleural effusion.    CT abdomen and pelvis: Elevation of the right diaphragm is noted.  There is large bowel interposed between the liver and the diaphragm.  There is a large amount of large bowel stool compatible with constipation.  In the rectum there is a large rectal fecal impaction.    The liver and spleen are unremarkable.  The gallbladder reveals several small densities which could represent stones.    The kidneys are grossly normal with no stones or  hydronephrosis.    The abdominal aorta reveals mild ectasia with a maximum diameter of 3.1 cm.  Mild atherosclerotic calcification of the iliac vessels is noted as well.    No free fluid.                               X-Ray Chest 1 View (Final result)  Result time 03/07/20 13:38:18    Final result by Adebayo Carter MD (03/07/20 13:38:18)                 Impression:      Prominent elevation of the right diaphragm with increasing discoid atelectasis right lung base.      Electronically signed by: Adebayo Carter MD  Date:    03/07/2020  Time:    13:38             Narrative:    EXAMINATION:  XR CHEST 1 VIEW    CLINICAL HISTORY:  Respiratory distress.,    COMPARISON:  03/04/2020.    FINDINGS:  There is prominent elevation of the right diaphragm with large bowel subjacent to the diaphragm.    There is associated compressive atelectasis at the right lung base.    The heart size is mildly enlarged.    The left lung is grossly clear.                               CT Head Without Contrast (Final result)  Result time 03/07/20 13:35:29    Final result by Adebayo Carter MD (03/07/20 13:35:29)                 Impression:      No acute findings.  Mild-to-moderate atrophy.      Electronically signed by: Adebayo Carter MD  Date:    03/07/2020  Time:    13:35             Narrative:    EXAMINATION:  CT HEAD WITHOUT CONTRAST    CLINICAL HISTORY:  Unresponsive.  Slurred speech.  Confusion.  TIA.    TECHNIQUE:  Standard noncontrast CT of the brain.    All CT scans at this facility are performed  using dose modulation techniques as appropriate to performed exam including the following:  automated exposure control; adjustment of mA and/or kV according to the patients size (this includes techniques or standardized protocols for targeted exams where dose is matched to indication/reason for exam: i.e. extremities or head);  iterative reconstruction technique.    COMPARISON:  03/04/2020    FINDINGS:  The ventricles are  moderately enlarged as are the extra-axial CSF spaces.    No change since the previous exam.    No acute hemorrhage, edema or mass effect is identified.    The skull is grossly normal.                              Assessment/Plan:     Leukocytosis  --unsure of source  --IV zosyn empirically  --IVF's  --monitor      Essential hypertension  Bp stable   Hold Hytrin   Monitor       Hyperlipidemia, mixed  Continue Statin       Other seizures  Continue Keppra.  Seizure precautions         Lactic acidosis  --IVF's  --repeat lactic pending  --monitor      Symptomatic anemia  Transfuse 2 units PRBCs.   -Monitor hemoglobin and hematocrit.         VTE Risk Mitigation (From admission, onward)         Ordered     Place KISHORE hose  Until discontinued      03/07/20 1741     IP VTE HIGH RISK PATIENT  Once      03/07/20 1741     Place sequential compression device  Until discontinued      03/07/20 1741                   KAYCE Purcell  Department of Hospital Medicine   [facility]

## 2020-03-08 NOTE — DISCHARGE SUMMARY
See Provations report for details.    EGD findings:  1. Two large excavated ulcers in the fundus of the stomach with visible vessels in both ulcers. First ulcer 20mm treated with epinephrine 1:10,000 and thermal therapy. Large second ulcer 30mm in size with visible vessels that began to bleed after epinephrine injection. Thermal therapies unsuccessful despite multiple application. Administered 2U pRBCs during endoscopy given brisk GI hemorrhage. Procedure aborted. Patient electively intubated for airway control.     2. Normal duodenum  3. Normal esophagus      Plan:  1. IR consult for angiography and coil embolization  2. Additional 2U pRBCs ordered  3. Continue Protonix gtt      Discussed with Dr. Cano, hospitalist and Dr. Abdul of Interventional Radiology. Family, Danni patient's MPOA also updated. Questions answered. Understands proposed plan.      Gilda Mast MD  Inpatient Gastroenterology

## 2020-03-08 NOTE — NURSING
Spoke with poli boogie in lab regarding patient calcium level 6.6. Primary nurse, Sonal, notified as well as JUVENAL Gonzalez.

## 2020-03-08 NOTE — INTERVAL H&P NOTE
The patient has been examined and the H&P has been reviewed:    I concur with the findings and no changes have occurred since H&P was written.    Anesthesia/Surgery risks, benefits and alternative options discussed and understood by patient/family.          Active Hospital Problems    Diagnosis  POA    *Lactic acidosis [E87.2]  Yes    Leukocytosis [D72.829]  Yes    Symptomatic anemia [D64.9]  Yes    Hyperlipidemia, mixed [E78.2]  Yes    Essential hypertension [I10]  Yes    Other seizures [G40.89]  Yes      Resolved Hospital Problems   No resolved problems to display.

## 2020-03-08 NOTE — PROCEDURES
"Adebayo Oneil is a 69 y.o. male patient.    Temp: 97.9 °F (36.6 °C) (03/08/20 1315)  Pulse: 81 (03/08/20 1345)  Resp: 18 (03/08/20 1345)  BP: (!) 114/57 (03/08/20 1345)  SpO2: 100 % (03/08/20 1345)  Weight: 65 kg (143 lb 4.8 oz) (03/08/20 0416)  Height: 5' 10" (177.8 cm) (03/07/20 7367)       Central Line  Date/Time: 3/8/2020 12:15 PM  Location procedure was performed: [facility] INTENSIVE CARE UNIT  Performed by: Mickey Gonzalez NP  Pre-operative Diagnosis: circulatory shock  Post-operative diagnosis: circulatory shock  Consent Done: Emergent Situation  Time out: Immediately prior to procedure a "time out" was called to verify the correct patient, procedure, equipment, support staff and site/side marked as required.  Indications: vascular access  Anesthesia: local infiltration    Anesthesia:  Local Anesthetic: lidocaine 1% without epinephrine  Anesthetic total: 2 mL  Preparation: skin prepped with ChloraPrep  Skin prep agent dried: skin prep agent completely dried prior to procedure  Sterile barriers: all five maximum sterile barriers used - cap, mask, sterile gown, sterile gloves, and large sterile sheet  Hand hygiene: hand hygiene performed prior to central venous catheter insertion  Location details: left femoral  Site selection rationale: emergent with severe symptomatic anemia  Catheter type: triple lumen  Catheter size: 7 Fr  Catheter Length: 20cm    Ultrasound guidance: yes  Vessel Caliber: medium, patent, compressibility normal  Vascular Doppler: not done  Needle advanced into vessel with real time Ultrasound guidance.  Guidewire confirmed in vessel.  Sterile sheath used.  Manometry: No   Number of attempts: 1  Assessment: successful placement  Complications: none  Estimated blood loss (mL): 1  Specimens: No  Implants: No  Post-procedure: line sutured,  chlorhexidine patch,  sterile dressing applied and blood return through all ports  Complications: No  Comments: Placement confirmed in femoral vein with "           Mickye Gonzalez  3/8/2020

## 2020-03-08 NOTE — HOSPITAL COURSE
3/8 - Arrived to ICU supine and unresponsive intubated on mech ventilation still receiving FFP.  Right groin arterial sheath still in place and VSS on IVFs and Kaleb infusion.  He had OG replaced revealing blood and ABG w/ lytes revealed POCT Hct < 15.  3 more units PRBCs ordered emergently with Cryo and Kaleb changed to Levophed due to mild bradycardia.  Left femoral CL placed emergently and transducer connected to right femoral sheath for continuous arterial pressure monitoring.  Dr. Cano at bedside and case discussed in detail.    3/9- Supine in bed with no acute distress intubated. Patient placed on SBT this AM. Extubated this AM. Patient's H&H stable this AM with no plans to transfuse. Levophed infusion off last PM with stable hemodynamics.   3/10 - Supine in bed somnolent in no distress with VSS.  Tolerated extubation yesterday.  No active bleeding.  Off pressor for 36 hours.

## 2020-03-08 NOTE — ANESTHESIA PROCEDURE NOTES
Intubation  Performed by: Rajan Dueñas CRNA  Authorized by: Rajan Dueñas CRNA     Intubation:     Induction:  Intravenous    Intubated:  Postinduction    Mask Ventilation:  Easy mask    Attempts:  1    Attempted By:  CRNA    Method of Intubation:  Direct    Blade:  Pavon 2    Laryngeal View Grade: Grade I - full view of chords      Difficult Airway Encountered?: No      Complications:  None    Airway Device:  Direct and oral endotracheal tube    Airway Device Size:  7.5    Style/Cuff Inflation:  Cuffed (inflated to minimal occlusive pressure)    Tube secured:  22    Secured at:  The lips    Placement Verified By:  Auscultation and Capnometry    Complicating Factors:  None    Findings Post-Intubation:  Bilateral breath sounds, positive ETCO2 and atraumatic / condition of teeth unchanged  Notes:      Pt intubated following EGD in which GI bleeding occurred

## 2020-03-08 NOTE — OR NURSING
Additional 2 units PRBC's requested from blood bank; order placed to stay two units ahead; awaiting cath lab team; continous monitoring; Ambu per Anesthesia; see anesthesia for vitals

## 2020-03-08 NOTE — SUBJECTIVE & OBJECTIVE
Past Medical History:   Diagnosis Date    Abdominal aortic aneurysm     Acute bronchospasm     Anxiety disorder due to known physiological condition     Atherosclerosis     Candidiasis of skin and nail     Chronic constipation     Conduct disorder, unspecified     Deficiency of other specified B group vitamins     Dehydration     Essential hypertension     Extended spectrum beta lactamase (ESBL) resistance     Generalized anxiety disorder     GERD with esophagitis     Hyperlipidemia, mixed     Other seasonal allergic rhinitis     Other seizures     Parkinson disease     Seizure     Ulcerative colitis     Vitamin D deficiency        Past Surgical History:   Procedure Laterality Date    ESOPHAGOGASTRODUODENOSCOPY N/A 3/5/2020    Procedure: EGD (ESOPHAGOGASTRODUODENOSCOPY);  Surgeon: Dane Ashraf III, MD;  Location: [facility];  Service: Endoscopy;  Laterality: N/A;       Review of patient's allergies indicates:  No Known Allergies    Family History     None        Tobacco Use    Smoking status: Unknown If Ever Smoked   Substance and Sexual Activity    Alcohol use: Not on file    Drug use: Never    Sexual activity: Not Currently         Review of Systems   Unable to perform ROS: Intubated     Objective:     Vital Signs (Most Recent):  Temp: 97.9 °F (36.6 °C) (03/08/20 1315)  Pulse: 81 (03/08/20 1345)  Resp: 18 (03/08/20 1345)  BP: (!) 114/57 (03/08/20 1345)  SpO2: 100 % (03/08/20 1345) Vital Signs (24h Range):  Temp:  [96.8 °F (36 °C)-99 °F (37.2 °C)] 97.9 °F (36.6 °C)  Pulse:  [] 81  Resp:  [15-19] 18  SpO2:  [93 %-100 %] 100 %  BP: ()/() 114/57     Weight: 65 kg (143 lb 4.8 oz)  Body mass index is 20.56 kg/m².      Intake/Output Summary (Last 24 hours) at 3/8/2020 1357  Last data filed at 3/8/2020 1100  Gross per 24 hour   Intake 3058 ml   Output 2000 ml   Net 1058 ml       Physical Exam   Constitutional: He appears well-developed and well-nourished.  Non-toxic  appearance. He has a sickly appearance. He appears ill. No distress. He is intubated and restrained.   HENT:   Head: Normocephalic and atraumatic.   Mouth/Throat: Oropharynx is clear and moist and mucous membranes are normal.   Eyes: Pupils are equal, round, and reactive to light. Lids are normal.   Neck: Trachea normal. Neck supple. Carotid bruit is not present.   Cardiovascular: Normal rate and regular rhythm. Exam reveals distant heart sounds.   Pulses:       Radial pulses are 1+ on the right side, and 1+ on the left side.        Dorsalis pedis pulses are 1+ on the right side, and 1+ on the left side.   Pulmonary/Chest: Effort normal and breath sounds normal. No accessory muscle usage. No tachypnea. He is intubated. No respiratory distress.   Abdominal: Soft. Normal appearance. He exhibits no distension. Bowel sounds are absent. There is no tenderness.   Genitourinary: Penis normal.   Genitourinary Comments: Adhikari in place   Musculoskeletal:        Right foot: There is no deformity.        Left foot: There is no deformity.   No edema   Lymphadenopathy:     He has no cervical adenopathy.     He has no axillary adenopathy.   Neurological: He is unresponsive.   PERRLA.  No corneal reflex and no withdrawal to pain.  Weak gag reflex.  No spont or purposeful movements.  No seizure activity   Skin: Skin is warm and dry. Capillary refill takes 2 to 3 seconds. No rash noted. No cyanosis.        Pale       Vents:  Vent Mode: A/C (03/08/20 1220)  Ventilator Initiated: Yes (03/08/20 1037)  Set Rate: 16 BPM (03/08/20 1220)  Vt Set: 0 mL (03/08/20 1220)  Pressure Support: 0 cmH20 (03/08/20 1220)  PEEP/CPAP: 8 cmH20 (03/08/20 1220)  Oxygen Concentration (%): 35 (03/08/20 1244)  Peak Airway Pressure: 24 cmH2O (03/08/20 1220)  Plateau Pressure: 0 cmH20 (03/08/20 1220)  Total Ve: 9.26 mL (03/08/20 1220)  F/VT Ratio<105 (RSBI): (!) 27.59 (03/08/20 1220)    Lines/Drains/Airways     Central Venous Catheter Line             Percutaneous Central Line Insertion/Assessment - Triple Lumen  03/08/20 left femoral vein less than 1 day          Drain                 NG/OG Tube 03/08/20 less than 1 day         Sheath 03/08/20 Right less than 1 day         Urethral Catheter 03/08/20 less than 1 day          Airway                 Airway - Non-Surgical 03/08/20 0944 less than 1 day          Peripheral Intravenous Line                 Peripheral IV - Single Lumen 03/07/20 1308 18 G Left Antecubital less than 1 day         Peripheral IV - Single Lumen 03/07/20 1350 20 G Right Forearm less than 1 day         Peripheral IV - Single Lumen 03/08/20 0918 18 G Left Wrist less than 1 day                Significant Labs:    CBC/Anemia Profile:  Recent Labs   Lab 03/07/20  1251  03/08/20  0746 03/08/20  1128 03/08/20  1258   WBC 23.34*  --  11.25 13.74*  --    HGB 6.6*   < > 6.6* 7.3* 6.1*   HCT 21.3*   < > 21.1* 21.7* 16.9*     --  203 197  --    MCV 91  --  92 91  --    RDW 16.7*  --  16.7* 16.4*  --     < > = values in this interval not displayed.        Chemistries:  Recent Labs   Lab 03/07/20  1251 03/08/20  0413   * 147*   K 4.4 4.1   * 114*   CO2 18* 20*   BUN 59* 62*   CREATININE 1.3 1.1   CALCIUM 8.0* 7.7*   ALBUMIN 2.4*  --    PROT 5.2*  --    BILITOT 0.2  --    ALKPHOS 81  --    ALT 15  --    AST 16  --      Coagulation:   Recent Labs   Lab 03/07/20  1251   INR 1.0     Lactic Acid:   Recent Labs   Lab 03/07/20  1333 03/07/20  1902   LACTATE 3.7* 2.2     All pertinent labs within the past 24 hours have been reviewed.    Significant Imaging:   CT: I have reviewed all pertinent results/findings within the past 24 hours and my personal findings are:  chest/abd/pelvis: elevated right HD and RLL atelectasis, high grade impaction

## 2020-03-08 NOTE — PLAN OF CARE
Patient's SUAD Simmonsna contacted via phone; spoke with Dr Mast who explained procedure; risks and benefits; answered questions; verbal consent obtained via phone per Dr Mast and witnessed per this RN

## 2020-03-08 NOTE — ASSESSMENT & PLAN NOTE
3/8:  Currently on Levophed  Continue blood transfusions as indicated  Shock likely secondary to acute blood loss

## 2020-03-08 NOTE — OR NURSING
To cath lab via stretcher; ambu with 100% O2 per CRNA; monitor in use enroute; Unit 4 PRBC's infusing; report to cath lab team

## 2020-03-08 NOTE — HPI
Mr Oneil is a elderly 70 yo WM with a PMH of AAA, ELDA, chronic constipation, HTN, GERD/Esophagitis, HLD, Seizure, Parkinon's disease and UC.  He is a resident at [facility] and DNR.  He was recently discharged on 3 days ago post admit for Upper GI bleed and ABL anemia undergoing EGD on 3/5 revealing non bleeding gastric ulcer with adherent clot.  He received 3 units PRBCs at that time and on day of discharge H/H 7.7/25 and home with PPI.  He returned to ED 3/7 via EMS about 1300 hr here at [facility] after being found unresponsive about 0930 hr yesterday morning.  Upon EMS arrival he was awake but lethargic and confused with mumbled speech and BP 75/43.  In ED BP 89/66, WBC 23, LA 3.7.  CT head unremarkable for acute process.  Also had CT chest/Abd/Pelvis revealing elevated right HD, RLL atelectasis and high grade impaction.  BP improved with IVFs and PRBCs and admitted with GI consult.  He received 2 units PRBCs yesterday afternoon.  Yesterday evening about 2145 hr had large clot hematemesis started on PPI and given another 2 units PRBCs.  This AM taken to Endo per GI undergoing EGD.  EGD this morning: The duodenal bulb and second portion of the duodenum were normal.       Two oozing cratered gastric ulcers with visible vessels in each        ulcer were found in the gastric fundus. The smaller ulcer was 20mm        in size and a adherent clot was appreciated. Clot removal with        forceps was attempted and successful. This ulcer was injected with        6cc of 1:10,000 solution of epinephrine and then treated with        thermal bipolar therapy. hemostasis was achieved for the smaller        ulcer. The largest ulcer was 30 mm in largest dimension with two        large visible vessels. Area was unsuccessfully injected with 10 mL        of a 1:10,000 solution of epinephrine for hemostasis. Coagulation        for hemostasis using bipolar probe was unsuccessful. The ulcer's        vessel began to  actively spurt with failed endoscopic attempts. The        stomach began to fill with bright red blood. Procedure was aborted        and anesthesia electively intubated the patient for airway control.        Two units of pRBCs were administered during this procedure.       No gross lesions were noted in the entire esophagus.  IR consulted and taken to cath lab revealing: Active bleed off short gastric artery off supperior trunk of the splenic artery which was embolized to stasis with coils and particles.  He received 4 more units PRBCs between Endo and IR per report as well as 1 Plt and FFP X 2.  Admitted from IR to ICU.

## 2020-03-08 NOTE — OR NURSING
Contacted Blood bank; two units available; units released and blood bank tabs to Blood Bank per Noni Arredondo RN; 18g cathlon to Left forearm per CRNA

## 2020-03-08 NOTE — [employer] PATIENT INSTRUCTIONS
Discharge Summary/Instructions after an Endoscopic Procedure  Patient Name: Adebayo Oneil  Patient MRN: [id number]  Patient YOB: 1950 Sunday, March 08, 2020 Gilda Mast MD  RESTRICTIONS:  During your procedure today, you received medications for sedation.  These   medications may affect your judgment, balance and coordination.  Therefore,   for 24 hours, you have the following restrictions:   - DO NOT drive a car, operate machinery, make legal/financial decisions,   sign important papers or drink alcohol.    ACTIVITY:  Today: no heavy lifting, straining or running due to procedural   sedation/anesthesia.  The following day: return to full activity including work.  DIET:  Eat and drink normally unless instructed otherwise.     TREATMENT FOR COMMON SIDE EFFECTS:  - Mild abdominal pain, nausea, belching, bloating or excessive gas:  rest,   eat lightly and use a heating pad.  - Sore Throat: treat with throat lozenges and/or gargle with warm salt   water.  - Because air was used during the procedure, expelling large amounts of air   from your rectum or belching is normal.  - If a bowel prep was taken, you may not have a bowel movement for 1-3 days.    This is normal.  SYMPTOMS TO WATCH FOR AND REPORT TO YOUR PHYSICIAN:  1. Abdominal pain or bloating, other than gas cramps.  2. Chest pain.  3. Back pain.  4. Signs of infection such as: chills or fever occurring within 24 hours   after the procedure.  5. Rectal bleeding, which would show as bright red, maroon, or black stools.   (A tablespoon of blood from the rectum is not serious, especially if   hemorrhoids are present.)  6. Vomiting.  7. Weakness or dizziness.  GO DIRECTLY TO THE NEAREST EMERGENCY ROOM IF YOU HAVE ANY OF THE FOLLOWING:      Difficulty breathing              Chills and/or fever over 101 F   Persistent vomiting and/or vomiting blood   Severe abdominal pain   Severe chest pain   Black, tarry stools   Bleeding- more than one  tablespoon   Any other symptom or condition that you feel may need urgent attention  Your doctor recommends these additional instructions:  If any biopsies were taken, your doctors clinic will contact you in 1 to 2   weeks with any results.  - Transport patient to IR.   - Angiography with coil embolization  - The findings and recommendations were discussed with the patient's primary   hospital physician, Dr. Cano.   - The findings and recommendations were discussed with the patient's family,   CATHRYN Razo.   - Discussed with Interventional Radiology, Dr. Abdul.  For questions, problems or results please call your physician Gilda Mast MD at Work:  [phone]  If you have any questions about the above instructions, call the GI   department at [phone] or call the endoscopy unit at [phone]   from 7am until 3 pm.  [facility] [place], EMERGENCY ROOM PHONE NUMBER:   [phone]  IF A COMPLICATION OR EMERGENCY SITUATION ARISES AND YOU ARE UNABLE TO REACH   YOUR PHYSICIAN - GO DIRECTLY TO THE EMERGENCY ROOM.  I have read or have had read to me these discharge instructions for my   procedure and have received a written copy.  I understand these   instructions and will follow-up with my physician if I have any questions.     __________________________________       _____________________________________  Nurse Signature                                          Patient/Designated   Responsible Party Signature  MD Gilda Murillo MD  3/8/2020 10:10:16 AM  This report has been verified and signed electronically.  [employer]

## 2020-03-08 NOTE — NURSING
Very large blood clot noted in emesis after one episode of vomiting.     Notified JUVENAL Roberts     Pt receiving 1 of 2 units of RBC.    See new orders    GI consulted as well

## 2020-03-08 NOTE — PROGRESS NOTES
Received PT in cath lab. Placed on [id number]. All alarms functioning and  ET tube is secured 23 at lip. No distress obs. Rt at bs while procedure is going on.

## 2020-03-08 NOTE — ASSESSMENT & PLAN NOTE
3/8:  Continue to transfuse packed RBCs as needed  Goal to keep hemoglobin above 8  Continue to monitor H&H every 4-6 hours  IR was successful in cauterizing and bleed of short gastric arteries  GI successful in cauterizing and gastric ulcer bleed  Continue Protonix drip

## 2020-03-08 NOTE — OR NURSING
House Supervisor contacted; request to contact IR immediately due to arterial bleed; 1st unit of PRBC's hung per anesthesia

## 2020-03-08 NOTE — CONSULTS
[facility] -   Gastroenterology  Consult Note    Patient Name: Adebayo Oneil  MRN: [id number]  Admission Date: 3/7/2020  Hospital Length of Stay: 0 days  Code Status: DNR   Attending Provider: Joao Cano MD   Consulting Provider: Gilda Mast MD  Primary Care Physician: Provider Notinsystem  Principal Problem:Lactic acidosis    Consults  Subjective:     HPI: 69 y.o male with history of known gastric ulcer diagnosed on recent EGD on 3/5/20 by Dr. Ashraf of Gastroenterology. Findings at that time identified a adherent clot on a 14mm ulcer. No therapy was performed. Patient was discharged the following day with a H&H of 7.7/25 and acid suppression therapy. A repeat endoscopy recommended in the outpatient setting to confirm healing.    Patient was brought back to the ED today via EMS after being found altered and decrease level of consciousness. Furthermore vitals demonstrated hypotension at 75/43 and 64/48. He was resuscitated with fluids and his pressures improved. Labs today showed his H&H dropped from 7.7/25 to 6.6/21.3. BUN also increased and abnormal at 59/1.3.     I was consulted this evening after the patient was witnessed to throw up a large fresh clot. His vitals remained stable and he is currently being transfused his first unit of pRBCs. He is scheduled to receive 2. He has also been placed on a Protonix gtt. Patient previously was on Protonix IV BID. Patient is unaware of his surroundings and appears confused when discussing his known gastric ulcer diagnosed a few days ago.       Past Medical History:   Diagnosis Date    Abdominal aortic aneurysm     Acute bronchospasm     Anxiety disorder due to known physiological condition     Atherosclerosis     Candidiasis of skin and nail     Chronic constipation     Conduct disorder, unspecified     Deficiency of other specified B group vitamins     Dehydration     Essential hypertension     Extended spectrum beta lactamase (ESBL)  resistance     Generalized anxiety disorder     GERD with esophagitis     Hyperlipidemia, mixed     Other seasonal allergic rhinitis     Other seizures     Parkinson disease     Seizure     Ulcerative colitis     Vitamin D deficiency        Past Surgical History:   Procedure Laterality Date    ESOPHAGOGASTRODUODENOSCOPY N/A 3/5/2020    Procedure: EGD (ESOPHAGOGASTRODUODENOSCOPY);  Surgeon: Dane Ashraf III, MD;  Location: [facility];  Service: Endoscopy;  Laterality: N/A;       Review of patient's allergies indicates:  No Known Allergies  Family History     None        Tobacco Use    Smoking status: Unknown If Ever Smoked   Substance and Sexual Activity    Alcohol use: Not on file    Drug use: Never    Sexual activity: Not Currently     Review of Systems  Objective:     Vital Signs (Most Recent):  Temp: 98.6 °F (37 °C) (03/07/20 2217)  Pulse: 89 (03/07/20 2217)  Resp: 18 (03/07/20 2217)  BP: 118/69 (03/07/20 2217)  SpO2: 96 % (03/07/20 2217) Vital Signs (24h Range):  Temp:  [97.5 °F (36.4 °C)-98.9 °F (37.2 °C)] 98.6 °F (37 °C)  Pulse:  [76-94] 89  Resp:  [12-20] 18  SpO2:  [96 %-100 %] 96 %  BP: ()/(56-69) 118/69     Weight: 65.1 kg (143 lb 8.3 oz) (03/07/20 1847)  Body mass index is 20.59 kg/m².    No intake or output data in the 24 hours ending 03/07/20 2302    Lines/Drains/Airways     Peripheral Intravenous Line                 Peripheral IV - Single Lumen 03/07/20 1308 18 G Left Antecubital less than 1 day         Peripheral IV - Single Lumen 03/07/20 1350 20 G Right Forearm less than 1 day                Physical Exam   HENT:   Head: Normocephalic and atraumatic.   Cardiovascular: Regular rhythm and normal heart sounds.   Pulmonary/Chest: Effort normal and breath sounds normal.   Abdominal: Soft. Bowel sounds are normal. There is no tenderness. There is no guarding.   Skin: Skin is warm and dry. There is pallor.   Psychiatric: He has a normal mood and affect. His behavior is normal.    Vitals reviewed.      Significant Labs:  Recent Lab Results       03/07/20  2212   03/07/20  1902   03/07/20  1621   03/07/20  1333   03/07/20  1312        Influenza A, Molecular         Negative     Influenza B, Molecular         Negative     Unit Blood Type Code       6200[P]              6200[P]       Unit Expiration       [id number][P]              [id number][P]       Procalcitonin               Unit Blood Type       A POS[P]              A POS[P]       Albumin               Alkaline Phosphatase               ALT               Anion Gap               Aniso               Appearance, UA     Clear         AST               Baso #               Basophil%               Bilirubin (UA)     Negative         BILIRUBIN TOTAL               BNP               BUN, Bld               Matheus Cells               Calcium               Chloride               CO2               CODING SYSTEM       VVNU727[P]              SDSX208[P]       Color, UA     Yellow         Creatinine               Differential Method               DISPENSE STATUS       CROSSMATCHED[P]              ISSUED[P]       eGFR if                eGFR if non                Eos #               Eosinophil%               Flu A & B Source         Nasal swab     Glucose               Glucose, UA     Negative         Gran # (ANC)               Gran%               Group & Rh       A POS       Hematocrit 22.3             Hemoglobin 6.9             Immature Grans (Abs)               Immature Granulocytes               INDIRECT NAT       NEG       INR               Ketones, UA     Negative         Lactate, Scott   2.2  Comment:  Falsely low lactic acid results can be found in samples   containing >=13.0 mg/dL total bilirubin and/or >=3.5 mg/dL   direct bilirubin.     3.7  Comment:  Falsely low lactic acid results can be found in samples   containing >=13.0 mg/dL total bilirubin and/or >=3.5 mg/dL   direct bilirubin.  LA  critical result(s)  called and verbal readback obtained from MALDONADO WALTON RN by BRIDGER 03/07/2020 14:14         Leukocytes, UA     Negative         Lymph #               Lymph%               MCH               MCHC               MCV               Mono #               Mono%               MPV               NITRITE UA     Negative         nRBC               Occult Blood UA     Negative         Ovalocytes               pH, UA     5.0         Platelet Estimate               Platelets               Poik               Potassium               Product Code       R4440G25[P]              [id number][P]       PROTEIN TOTAL               Protein, UA     Negative  Comment:  Recommend a 24 hour urine protein or a urine   protein/creatinine ratio if globulin induced proteinuria is  clinically suspected.           Protime               RBC               RDW               Sodium               Specific Gravity, UA     1.020         Specimen UA     Urine, Catheterized         Troponin I               UNIT NUMBER       [id number][P]              [id number][P]       UROBILINOGEN UA     Negative         WBC                                03/07/20  1251        Influenza A, Molecular       Influenza B, Molecular       Unit Blood Type Code       Unit Expiration       Procalcitonin 0.14  Comment:  A concentration < 0.25 ng/mL represents a low risk bacterial   infection.  Procalcitonin may not be accurate among patients with localized   infection, recent trauma or major surgery, immunosuppressed state,   invasive fungal infection, renal dysfunction. Decisions regarding   initiation or continuation of antibiotic therapy should not be based   solely on procalcitonin levels.       Unit Blood Type       Albumin 2.4     Alkaline Phosphatase 81     ALT 15     Anion Gap 17     Aniso Slight     Appearance, UA       AST 16     Baso # 0.04     Basophil% 0.2     Bilirubin (UA)       BILIRUBIN TOTAL 0.2  Comment:  For infants and newborns, interpretation of results should  be based  on gestational age, weight and in agreement with clinical  observations.  Premature Infant recommended reference ranges:  Up to 24 hours.............<8.0 mg/dL  Up to 48 hours............<12.0 mg/dL  3-5 days..................<15.0 mg/dL  6-29 days.................<15.0 mg/dL       BNP 31  Comment:  Values of less than 100 pg/ml are consistent with non-CHF populations.     BUN, Bld 59     Nathalie Cells Occasional     Calcium 8.0     Chloride 111     CO2 18     CODING SYSTEM       Color, UA       Creatinine 1.3     Differential Method Automated     DISPENSE STATUS       eGFR if  >60     eGFR if non  55  Comment:  Calculation used to obtain the estimated glomerular filtration  rate (eGFR) is the CKD-EPI equation.        Eos # 0.0     Eosinophil% 0.1     Flu A & B Source       Glucose 126     Glucose, UA       Gran # (ANC) 21.2     Gran% 90.7     Group & Rh       Hematocrit 21.3     Hemoglobin 6.6     Immature Grans (Abs) 0.18  Comment:  Mild elevation in immature granulocytes is non specific and   can be seen in a variety of conditions including stress response,   acute inflammation, trauma and pregnancy. Correlation with other   laboratory and clinical findings is essential.       Immature Granulocytes 0.8     INDIRECT NAT       INR 1.0  Comment:  Coumadin Therapy:  2.0 - 3.0 for INR for all indicators except mechanical heart valves  and antiphospholipid syndromes which should use 2.5 - 3.5.       Ketones, UA       Lactate, Scott       Leukocytes, UA       Lymph # 0.6     Lymph% 2.4     MCH 28.3     MCHC 31.0     MCV 91     Mono # 1.4     Mono% 5.8     MPV 10.6     NITRITE UA       nRBC 0     Occult Blood UA       Ovalocytes Occasional     pH, UA       Platelet Estimate Appears normal     Platelets 274  Comment:  Results confirmed, test repeated     Poik Slight     Potassium 4.4     Product Code       PROTEIN TOTAL 5.2     Protein, UA       Protime 10.8     RBC 2.33     RDW  16.7     Sodium 146     Specific Gravity, UA       Specimen UA       Troponin I <0.006  Comment:  The reference interval for Troponin I represents the 99th percentile   cutoff   for our facility and is consistent with 3rd generation assay   performance.       UNIT NUMBER       UROBILINOGEN UA       WBC 23.34           Significant Imaging:  Imaging results within the past 24 hours have been reviewed.    Assessment/Plan:     Active Diagnoses:    Diagnosis Date Noted POA    PRINCIPAL PROBLEM:  Lactic acidosis [E87.2] 03/04/2020 Yes    Leukocytosis [D72.829] 03/07/2020 Yes    Symptomatic anemia [D64.9] 03/04/2020 Yes    Hyperlipidemia, mixed [E78.2]  Yes    Essential hypertension [I10]  Yes    Other seizures [G40.89]  Yes      Problems Resolved During this Admission:       1. Recurrent UGIB  - 2/2     Recommendations:  1. Keep NPO  2. Agree with resuscitation efforts with colloid products  3. Agree with Protonix gtt  4. No heparin products  5. EGD with MAC in AM      Thank you for your consult. I will follow the patient. Please contact me overnight if the patient becomes hemodynamically unstable or there are any questions regarding management. Discussed with ESME Schroeder.       Gilda Mast MD  Gastroenterology  [facility] [place]

## 2020-03-08 NOTE — PLAN OF CARE
Pt oriented to self. Avasys in room  Plan of care reviewed SUAD amber at [facility]. Verbalized understanding  Pt remained free from falls. All fall and safety precautions in place  NSR on tele monitor  PIV, CDI   VSS  Call bell and personal items within reach.  Hourly rounding complete.   Continue current plan of care   Pt npo  Egd today  2 units RBC complete. H&H pending    Problem: Wound  Goal: Optimal Wound Healing  3/8/2020 0513 by Carla Cantu RN  Outcome: Ongoing, Progressing  3/8/2020 0305 by Carla Cantu RN  Outcome: Ongoing, Progressing     Problem: Fall Injury Risk  Goal: Absence of Fall and Fall-Related Injury  3/8/2020 0513 by Carla Cantu RN  Outcome: Ongoing, Progressing  3/8/2020 0305 by Carla Cantu RN  Outcome: Ongoing, Progressing     Problem: Adult Inpatient Plan of Care  Goal: Plan of Care Review  3/8/2020 0513 by Carla Cantu RN  Outcome: Ongoing, Progressing  Flowsheets (Taken 3/8/2020 0513)  Plan of Care Reviewed With: patient  3/8/2020 0410 by Carla Cantu RN  Flowsheets (Taken 3/8/2020 0306)  Plan of Care Reviewed With: patient  3/8/2020 0305 by Carla Cantu RN  Outcome: Ongoing, Progressing  Goal: Patient-Specific Goal (Individualization)  3/8/2020 0513 by Carla Cantu RN  Outcome: Ongoing, Progressing  3/8/2020 0305 by Carla Cantu RN  Outcome: Ongoing, Progressing  Goal: Absence of Hospital-Acquired Illness or Injury  3/8/2020 0513 by Carla Cantu RN  Outcome: Ongoing, Progressing  3/8/2020 0305 by Carla Cantu RN  Outcome: Ongoing, Progressing  Goal: Optimal Comfort and Wellbeing  3/8/2020 0513 by Carla Cantu RN  Outcome: Ongoing, Progressing  3/8/2020 0305 by Carla Cantu RN  Outcome: Ongoing, Progressing  Goal: Readiness for Transition of Care  3/8/2020 0513 by Carla Cantu RN  Outcome: Ongoing, Progressing  3/8/2020 0305 by Carla Cantu RN  Outcome: Ongoing, Progressing  Goal: Rounds/Family Conference  3/8/2020 0513 by Carla PYLE  MARSHALL Cantu  Outcome: Ongoing, Progressing  3/8/2020 0305 by Carla Cantu RN  Outcome: Ongoing, Progressing     Problem: Skin Injury Risk Increased  Goal: Skin Health and Integrity  3/8/2020 0513 by Carla Cantu RN  Outcome: Ongoing, Progressing  3/8/2020 0305 by Carla Cantu RN  Outcome: Ongoing, Progressing     Problem: Anemia  Goal: Anemia Symptom Improvement  Outcome: Ongoing, Progressing

## 2020-03-08 NOTE — ASSESSMENT & PLAN NOTE
Cont full vent support   SAT/SBT once hemodynamically stable and alert  VAP prophylaxis  Vent settings reviewed and adjusted

## 2020-03-08 NOTE — H&P (VIEW-ONLY)
[facility] -   Gastroenterology  Consult Note    Patient Name: Adebayo Oneil  MRN: [id number]  Admission Date: 3/7/2020  Hospital Length of Stay: 0 days  Code Status: DNR   Attending Provider: Joao Cano MD   Consulting Provider: Gilda Mast MD  Primary Care Physician: Provider Notinsystem  Principal Problem:Lactic acidosis    Consults  Subjective:     HPI: 69 y.o male with history of known gastric ulcer diagnosed on recent EGD on 3/5/20 by Dr. Ashraf of Gastroenterology. Findings at that time identified a adherent clot on a 14mm ulcer. No therapy was performed. Patient was discharged the following day with a H&H of 7.7/25 and acid suppression therapy. A repeat endoscopy recommended in the outpatient setting to confirm healing.    Patient was brought back to the ED today via EMS after being found altered and decrease level of consciousness. Furthermore vitals demonstrated hypotension at 75/43 and 64/48. He was resuscitated with fluids and his pressures improved. Labs today showed his H&H dropped from 7.7/25 to 6.6/21.3. BUN also increased and abnormal at 59/1.3.     I was consulted this evening after the patient was witnessed to throw up a large fresh clot. His vitals remained stable and he is currently being transfused his first unit of pRBCs. He is scheduled to receive 2. He has also been placed on a Protonix gtt. Patient previously was on Protonix IV BID. Patient is unaware of his surroundings and appears confused when discussing his known gastric ulcer diagnosed a few days ago.       Past Medical History:   Diagnosis Date    Abdominal aortic aneurysm     Acute bronchospasm     Anxiety disorder due to known physiological condition     Atherosclerosis     Candidiasis of skin and nail     Chronic constipation     Conduct disorder, unspecified     Deficiency of other specified B group vitamins     Dehydration     Essential hypertension     Extended spectrum beta lactamase (ESBL)  resistance     Generalized anxiety disorder     GERD with esophagitis     Hyperlipidemia, mixed     Other seasonal allergic rhinitis     Other seizures     Parkinson disease     Seizure     Ulcerative colitis     Vitamin D deficiency        Past Surgical History:   Procedure Laterality Date    ESOPHAGOGASTRODUODENOSCOPY N/A 3/5/2020    Procedure: EGD (ESOPHAGOGASTRODUODENOSCOPY);  Surgeon: Dane Ashraf III, MD;  Location: [facility];  Service: Endoscopy;  Laterality: N/A;       Review of patient's allergies indicates:  No Known Allergies  Family History     None        Tobacco Use    Smoking status: Unknown If Ever Smoked   Substance and Sexual Activity    Alcohol use: Not on file    Drug use: Never    Sexual activity: Not Currently     Review of Systems  Objective:     Vital Signs (Most Recent):  Temp: 98.6 °F (37 °C) (03/07/20 2217)  Pulse: 89 (03/07/20 2217)  Resp: 18 (03/07/20 2217)  BP: 118/69 (03/07/20 2217)  SpO2: 96 % (03/07/20 2217) Vital Signs (24h Range):  Temp:  [97.5 °F (36.4 °C)-98.9 °F (37.2 °C)] 98.6 °F (37 °C)  Pulse:  [76-94] 89  Resp:  [12-20] 18  SpO2:  [96 %-100 %] 96 %  BP: ()/(56-69) 118/69     Weight: 65.1 kg (143 lb 8.3 oz) (03/07/20 1847)  Body mass index is 20.59 kg/m².    No intake or output data in the 24 hours ending 03/07/20 2302    Lines/Drains/Airways     Peripheral Intravenous Line                 Peripheral IV - Single Lumen 03/07/20 1308 18 G Left Antecubital less than 1 day         Peripheral IV - Single Lumen 03/07/20 1350 20 G Right Forearm less than 1 day                Physical Exam   HENT:   Head: Normocephalic and atraumatic.   Cardiovascular: Regular rhythm and normal heart sounds.   Pulmonary/Chest: Effort normal and breath sounds normal.   Abdominal: Soft. Bowel sounds are normal. There is no tenderness. There is no guarding.   Skin: Skin is warm and dry. There is pallor.   Psychiatric: He has a normal mood and affect. His behavior is normal.    Vitals reviewed.      Significant Labs:  Recent Lab Results       03/07/20  2212   03/07/20  1902   03/07/20  1621   03/07/20  1333   03/07/20  1312        Influenza A, Molecular         Negative     Influenza B, Molecular         Negative     Unit Blood Type Code       6200[P]              6200[P]       Unit Expiration       [id number][P]              [id number][P]       Procalcitonin               Unit Blood Type       A POS[P]              A POS[P]       Albumin               Alkaline Phosphatase               ALT               Anion Gap               Aniso               Appearance, UA     Clear         AST               Baso #               Basophil%               Bilirubin (UA)     Negative         BILIRUBIN TOTAL               BNP               BUN, Bld               Matheus Cells               Calcium               Chloride               CO2               CODING SYSTEM       [id number][P]              [id number][P]       Color, UA     Yellow         Creatinine               Differential Method               DISPENSE STATUS       CROSSMATCHED[P]              ISSUED[P]       eGFR if                eGFR if non                Eos #               Eosinophil%               Flu A & B Source         Nasal swab     Glucose               Glucose, UA     Negative         Gran # (ANC)               Gran%               Group & Rh       A POS       Hematocrit 22.3             Hemoglobin 6.9             Immature Grans (Abs)               Immature Granulocytes               INDIRECT NAT       NEG       INR               Ketones, UA     Negative         Lactate, Scott   2.2  Comment:  Falsely low lactic acid results can be found in samples   containing >=13.0 mg/dL total bilirubin and/or >=3.5 mg/dL   direct bilirubin.     3.7  Comment:  Falsely low lactic acid results can be found in samples   containing >=13.0 mg/dL total bilirubin and/or >=3.5 mg/dL   direct bilirubin.  LA  critical result(s)  called and verbal readback obtained from MALDONADO WALTON RN by BRIDGER 03/07/2020 14:14         Leukocytes, UA     Negative         Lymph #               Lymph%               MCH               MCHC               MCV               Mono #               Mono%               MPV               NITRITE UA     Negative         nRBC               Occult Blood UA     Negative         Ovalocytes               pH, UA     5.0         Platelet Estimate               Platelets               Poik               Potassium               Product Code       [id number][P]              [id number][P]       PROTEIN TOTAL               Protein, UA     Negative  Comment:  Recommend a 24 hour urine protein or a urine   protein/creatinine ratio if globulin induced proteinuria is  clinically suspected.           Protime               RBC               RDW               Sodium               Specific Gravity, UA     1.020         Specimen UA     Urine, Catheterized         Troponin I               UNIT NUMBER       [id number][P]              [id number][P]       UROBILINOGEN UA     Negative         WBC                                03/07/20  1251        Influenza A, Molecular       Influenza B, Molecular       Unit Blood Type Code       Unit Expiration       Procalcitonin 0.14  Comment:  A concentration < 0.25 ng/mL represents a low risk bacterial   infection.  Procalcitonin may not be accurate among patients with localized   infection, recent trauma or major surgery, immunosuppressed state,   invasive fungal infection, renal dysfunction. Decisions regarding   initiation or continuation of antibiotic therapy should not be based   solely on procalcitonin levels.       Unit Blood Type       Albumin 2.4     Alkaline Phosphatase 81     ALT 15     Anion Gap 17     Aniso Slight     Appearance, UA       AST 16     Baso # 0.04     Basophil% 0.2     Bilirubin (UA)       BILIRUBIN TOTAL 0.2  Comment:  For infants and newborns, interpretation of results should  be based  on gestational age, weight and in agreement with clinical  observations.  Premature Infant recommended reference ranges:  Up to 24 hours.............<8.0 mg/dL  Up to 48 hours............<12.0 mg/dL  3-5 days..................<15.0 mg/dL  6-29 days.................<15.0 mg/dL       BNP 31  Comment:  Values of less than 100 pg/ml are consistent with non-CHF populations.     BUN, Bld 59     Avant Cells Occasional     Calcium 8.0     Chloride 111     CO2 18     CODING SYSTEM       Color, UA       Creatinine 1.3     Differential Method Automated     DISPENSE STATUS       eGFR if  >60     eGFR if non  55  Comment:  Calculation used to obtain the estimated glomerular filtration  rate (eGFR) is the CKD-EPI equation.        Eos # 0.0     Eosinophil% 0.1     Flu A & B Source       Glucose 126     Glucose, UA       Gran # (ANC) 21.2     Gran% 90.7     Group & Rh       Hematocrit 21.3     Hemoglobin 6.6     Immature Grans (Abs) 0.18  Comment:  Mild elevation in immature granulocytes is non specific and   can be seen in a variety of conditions including stress response,   acute inflammation, trauma and pregnancy. Correlation with other   laboratory and clinical findings is essential.       Immature Granulocytes 0.8     INDIRECT NAT       INR 1.0  Comment:  Coumadin Therapy:  2.0 - 3.0 for INR for all indicators except mechanical heart valves  and antiphospholipid syndromes which should use 2.5 - 3.5.       Ketones, UA       Lactate, Scott       Leukocytes, UA       Lymph # 0.6     Lymph% 2.4     MCH 28.3     MCHC 31.0     MCV 91     Mono # 1.4     Mono% 5.8     MPV 10.6     NITRITE UA       nRBC 0     Occult Blood UA       Ovalocytes Occasional     pH, UA       Platelet Estimate Appears normal     Platelets 274  Comment:  Results confirmed, test repeated     Poik Slight     Potassium 4.4     Product Code       PROTEIN TOTAL 5.2     Protein, UA       Protime 10.8     RBC 2.33     RDW  16.7     Sodium 146     Specific Gravity, UA       Specimen UA       Troponin I <0.006  Comment:  The reference interval for Troponin I represents the 99th percentile   cutoff   for our facility and is consistent with 3rd generation assay   performance.       UNIT NUMBER       UROBILINOGEN UA       WBC 23.34           Significant Imaging:  Imaging results within the past 24 hours have been reviewed.    Assessment/Plan:     Active Diagnoses:    Diagnosis Date Noted POA    PRINCIPAL PROBLEM:  Lactic acidosis [E87.2] 03/04/2020 Yes    Leukocytosis [D72.829] 03/07/2020 Yes    Symptomatic anemia [D64.9] 03/04/2020 Yes    Hyperlipidemia, mixed [E78.2]  Yes    Essential hypertension [I10]  Yes    Other seizures [G40.89]  Yes      Problems Resolved During this Admission:       1. Recurrent UGIB  - 2/2     Recommendations:  1. Keep NPO  2. Agree with resuscitation efforts with colloid products  3. Agree with Protonix gtt  4. No heparin products  5. EGD with MAC in AM      Thank you for your consult. I will follow the patient. Please contact me overnight if the patient becomes hemodynamically unstable or there are any questions regarding management. Discussed with ESME Schroeder.       Gilda Mast MD  Gastroenterology  [facility]

## 2020-03-08 NOTE — ASSESSMENT & PLAN NOTE
3/8:  Currently intubated  Was intubated during egd  Of note patient is DNR  Will plan wean vent as tolerated  Plan extubated soon as possible

## 2020-03-08 NOTE — ANESTHESIA PREPROCEDURE EVALUATION
03/08/2020  Adebayo Oneil is a 69 y.o., male  H/o AMS, significant anemia.  Parkinsons.  SZ d/.o.  For eval by MINO BATES , suspected GI bleed    Anesthesia Evaluation    I have reviewed the Patient Summary Reports.    I have reviewed the Nursing Notes.   I have reviewed the Medications.     Review of Systems  Anesthesia Hx:  No problems with previous Anesthesia    Hematology/Oncology:         -- Anemia:   4cm AAA  Anxiety,  Sz d/o  Atherosclerosis  Candidiasis  Chronic constipation.  Admitted distended with stool  Dehydration  H/o essential HTN  GERD, esophagitis  HLD  Parkinson's  SZ d/o  UC  Vit D defic.  Other vit defficiency        Physical Exam  General:  Cachexia    Airway/Jaw/Neck:  Airway Findings: Mouth Opening: Normal Mallampati: III     Eyes/Ears/Nose:  Eyes/Ears/Nose Findings: (Dry mouth)     Chest/Lungs:  Chest/Lungs Findings: Decreased Breathe Sounds Right     Heart/Vascular:  Heart Findings: Rhythm: Regular Rhythm        Mental Status:  Mental Status Findings: (Opens eyes to name.  Mumbles)  Confusion, Lethargic       Lab Results   Component Value Date    WBC 11.25 03/08/2020    HGB 6.6 (L) 03/08/2020    HCT 21.1 (L) 03/08/2020    MCV 92 03/08/2020     03/08/2020       Chemistry        Component Value Date/Time     (H) 03/08/2020 0413    K 4.1 03/08/2020 0413     (H) 03/08/2020 0413    CO2 20 (L) 03/08/2020 0413    BUN 62 (H) 03/08/2020 0413    CREATININE 1.1 03/08/2020 0413     (H) 03/08/2020 0413        Component Value Date/Time    CALCIUM 7.7 (L) 03/08/2020 0413    ALKPHOS 81 03/07/2020 1251    AST 16 03/07/2020 1251    ALT 15 03/07/2020 1251    BILITOT 0.2 03/07/2020 1251    ESTGFRAFRICA >60 03/08/2020 0413    EGFRNONAA >60 03/08/2020 0413        INR 1.0    EKG SR 78 Non spec T abn    Anesthesia Plan  Type of Anesthesia, risks & benefits  discussed:  Anesthesia Type:  MAC, general  Patient's Preference:   Intra-op Monitoring Plan: standard ASA monitors  Intra-op Monitoring Plan Comments:   Post Op Pain Control Plan: multimodal analgesia  Post Op Pain Control Plan Comments:   Induction:   IV  Beta Blocker:         Informed Consent: Patient representative understands risks and agrees with Anesthesia plan.  Questions answered.   ASA Score: 4     Day of Surgery Review of History & Physical:            Ready For Surgery From Anesthesia Perspective.

## 2020-03-08 NOTE — SEDATION DOCUMENTATION
Patient received directly from Endoscopy.  Consent verified.  LUDMILA Tolentino at bedside monitoring hemodynamics, medication, and airway.  VSS stable.  4/4 unit of PRBC transfusing.  FFP and Platelets, per lab, will be brought when they are ready within 30 minutes.

## 2020-03-08 NOTE — SIGNIFICANT EVENT
Notified by nursing staff that patient vomited a large blood clot. Recent upper GI showed a non-bleeding gastric ulcer with adherent clot. Patient is currently hemodynamically stable. Patient is receiving 2 units PRBCs. GI consulted, Dr. Mast at bedside to evaluate the patient. Protonix gtt started. Plans for repeat endoscopy in am. Will monitor serial H/H.

## 2020-03-08 NOTE — ASSESSMENT & PLAN NOTE
--IVF's  --repeat lactic pending  --monitor    3/8:  Lactic acid is still elevated  Likely secondary to drop in H&H along with hypotension  Will continue to monitor  Plan to repeat lactate acid

## 2020-03-08 NOTE — TRANSFER OF CARE
"Anesthesia Transfer of Care Note    Patient: Adebayo Oneil    Procedure(s) Performed: Procedure(s) (LRB):  EGD (ESOPHAGOGASTRODUODENOSCOPY) (N/A)    Patient location: Other: IR    Anesthesia Type: general    Transport from OR: Transported from OR intubated on 100% O2 by AMBU with adequate controlled ventilation. Upon arrival to PACU/ICU, patient attached to ventilator and auscultated to confirm bilateral breath sounds and adequate TV. Continuous ECG monitoring in transport. Continuous SpO2 monitoring in transport. Continuos invasive BP monitoring in transport    Post pain: adequate analgesia    Post assessment: no apparent anesthetic complications and tolerated procedure well    Post vital signs: stable    Level of consciousness: sedated    Nausea/Vomiting: no nausea/vomiting    Complications: none    Transfer of care protocol was followed      Last vitals:   Visit Vitals  /64   Pulse 94   Temp 36 °C (96.8 °F)   Resp 18   Ht 5' 10" (1.778 m)   Wt 65 kg (143 lb 4.8 oz)   SpO2 100%   BMI 20.56 kg/m²     "

## 2020-03-08 NOTE — ASSESSMENT & PLAN NOTE
Massive transfusion protocol  Has had 8 units PRBCs since yesterday and 11 units PRBCs last 4 days  Received FFP and Plts  Giving another 3 units PRBCs and Cryo now  H/H Q 4 hours  Monitor closely for bleeding and follow coags

## 2020-03-08 NOTE — ASSESSMENT & PLAN NOTE
--unsure of source  --IV zosyn empirically  --IVF's  --monitor    3/8:  Will continue Zosyn for now  Blood cultures pending  No source of infection  Plan to DC/de-escalate abx therapy tomorrow

## 2020-03-08 NOTE — OR NURSING
Verbal consent for anesthesia obtained from POA/Danni  per Dr Gan witnessed per Noni Arredondo RN

## 2020-03-09 PROBLEM — L30.8 DERMATITIS ASSOCIATED WITH MOISTURE: Status: ACTIVE | Noted: 2020-03-09

## 2020-03-09 LAB
ALBUMIN SERPL BCP-MCNC: 1.8 G/DL (ref 3.5–5.2)
ALLENS TEST: ABNORMAL
ALP SERPL-CCNC: 49 U/L (ref 55–135)
ALT SERPL W/O P-5'-P-CCNC: 5 U/L (ref 10–44)
ANION GAP SERPL CALC-SCNC: 9 MMOL/L (ref 8–16)
AST SERPL-CCNC: 18 U/L (ref 10–40)
BACTERIA BLD CULT: NORMAL
BACTERIA BLD CULT: NORMAL
BASOPHILS # BLD AUTO: 0.03 K/UL (ref 0–0.2)
BASOPHILS NFR BLD: 0.3 % (ref 0–1.9)
BILIRUB SERPL-MCNC: 0.4 MG/DL (ref 0.1–1)
BUN SERPL-MCNC: 53 MG/DL (ref 8–23)
CA-I BLDV-SCNC: 1.12 MMOL/L (ref 1.06–1.42)
CALCIUM SERPL-MCNC: 6.9 MG/DL (ref 8.7–10.5)
CHLORIDE SERPL-SCNC: 116 MMOL/L (ref 95–110)
CO2 SERPL-SCNC: 21 MMOL/L (ref 23–29)
CREAT SERPL-MCNC: 0.8 MG/DL (ref 0.5–1.4)
DELSYS: ABNORMAL
DIFFERENTIAL METHOD: ABNORMAL
EOSINOPHIL # BLD AUTO: 0.3 K/UL (ref 0–0.5)
EOSINOPHIL NFR BLD: 2.7 % (ref 0–8)
ERYTHROCYTE [DISTWIDTH] IN BLOOD BY AUTOMATED COUNT: 17.7 % (ref 11.5–14.5)
ERYTHROCYTE [SEDIMENTATION RATE] IN BLOOD BY WESTERGREN METHOD: 16 MM/H
EST. GFR  (AFRICAN AMERICAN): >60 ML/MIN/1.73 M^2
EST. GFR  (NON AFRICAN AMERICAN): >60 ML/MIN/1.73 M^2
FIBRINOGEN PPP-MCNC: 370 MG/DL (ref 182–366)
FIO2: 35
GLUCOSE SERPL-MCNC: 95 MG/DL (ref 70–110)
HCO3 UR-SCNC: 22.7 MMOL/L (ref 24–28)
HCT VFR BLD AUTO: 27.1 % (ref 40–54)
HCT VFR BLD AUTO: 27.5 % (ref 40–54)
HCT VFR BLD AUTO: 27.5 % (ref 40–54)
HCT VFR BLD AUTO: 28 % (ref 40–54)
HCT VFR BLD AUTO: 28 % (ref 40–54)
HCT VFR BLD AUTO: 29.1 % (ref 40–54)
HGB BLD-MCNC: 10 G/DL (ref 14–18)
HGB BLD-MCNC: 9.3 G/DL (ref 14–18)
HGB BLD-MCNC: 9.7 G/DL (ref 14–18)
HGB BLD-MCNC: 9.9 G/DL (ref 14–18)
IMM GRANULOCYTES # BLD AUTO: 0.06 K/UL (ref 0–0.04)
IMM GRANULOCYTES NFR BLD AUTO: 0.6 % (ref 0–0.5)
INR PPP: 1.1 (ref 0.8–1.2)
IP: 16
IT: 0.84
LACTATE SERPL-SCNC: 1 MMOL/L (ref 0.5–2.2)
LYMPHOCYTES # BLD AUTO: 1.1 K/UL (ref 1–4.8)
LYMPHOCYTES NFR BLD: 10.8 % (ref 18–48)
MAGNESIUM SERPL-MCNC: 1.8 MG/DL (ref 1.6–2.6)
MCH RBC QN AUTO: 32.3 PG (ref 27–31)
MCHC RBC AUTO-ENTMCNC: 35.3 G/DL (ref 32–36)
MCV RBC AUTO: 92 FL (ref 82–98)
MODE: ABNORMAL
MONOCYTES # BLD AUTO: 1.6 K/UL (ref 0.3–1)
MONOCYTES NFR BLD: 16 % (ref 4–15)
NEUTROPHILS # BLD AUTO: 7 K/UL (ref 1.8–7.7)
NEUTROPHILS NFR BLD: 69.6 % (ref 38–73)
NRBC BLD-RTO: 1 /100 WBC
PCO2 BLDA: 35 MMHG (ref 35–45)
PEEP: 8
PH SMN: 7.42 [PH] (ref 7.35–7.45)
PIP: 24
PLATELET # BLD AUTO: 138 K/UL (ref 150–350)
PMV BLD AUTO: 10.7 FL (ref 9.2–12.9)
PO2 BLDA: 163 MMHG (ref 80–100)
POC BE: -2 MMOL/L
POC SATURATED O2: 100 % (ref 95–100)
POTASSIUM SERPL-SCNC: 3.2 MMOL/L (ref 3.5–5.1)
PROT SERPL-MCNC: 3.9 G/DL (ref 6–8.4)
PROTHROMBIN TIME: 11.2 SEC (ref 9–12.5)
RBC # BLD AUTO: 3 M/UL (ref 4.6–6.2)
SAMPLE: ABNORMAL
SITE: ABNORMAL
SODIUM SERPL-SCNC: 146 MMOL/L (ref 136–145)
WBC # BLD AUTO: 10.08 K/UL (ref 3.9–12.7)

## 2020-03-09 PROCEDURE — 85018 HEMOGLOBIN: CPT

## 2020-03-09 PROCEDURE — 20000000 HC ICU ROOM

## 2020-03-09 PROCEDURE — 83605 ASSAY OF LACTIC ACID: CPT

## 2020-03-09 PROCEDURE — 85384 FIBRINOGEN ACTIVITY: CPT

## 2020-03-09 PROCEDURE — 63600175 PHARM REV CODE 636 W HCPCS: Performed by: NURSE PRACTITIONER

## 2020-03-09 PROCEDURE — 99291 PR CRITICAL CARE, E/M 30-74 MINUTES: ICD-10-PCS | Mod: ,,, | Performed by: NURSE PRACTITIONER

## 2020-03-09 PROCEDURE — 63600175 PHARM REV CODE 636 W HCPCS: Performed by: FAMILY MEDICINE

## 2020-03-09 PROCEDURE — 94003 VENT MGMT INPAT SUBQ DAY: CPT

## 2020-03-09 PROCEDURE — 25000003 PHARM REV CODE 250: Performed by: INTERNAL MEDICINE

## 2020-03-09 PROCEDURE — 80053 COMPREHEN METABOLIC PANEL: CPT

## 2020-03-09 PROCEDURE — 82803 BLOOD GASES ANY COMBINATION: CPT

## 2020-03-09 PROCEDURE — 94760 N-INVAS EAR/PLS OXIMETRY 1: CPT

## 2020-03-09 PROCEDURE — 85014 HEMATOCRIT: CPT | Mod: 91

## 2020-03-09 PROCEDURE — 85610 PROTHROMBIN TIME: CPT

## 2020-03-09 PROCEDURE — C9113 INJ PANTOPRAZOLE SODIUM, VIA: HCPCS | Performed by: NURSE PRACTITIONER

## 2020-03-09 PROCEDURE — 99900035 HC TECH TIME PER 15 MIN (STAT)

## 2020-03-09 PROCEDURE — 85018 HEMOGLOBIN: CPT | Mod: 91

## 2020-03-09 PROCEDURE — 27000221 HC OXYGEN, UP TO 24 HOURS

## 2020-03-09 PROCEDURE — 83735 ASSAY OF MAGNESIUM: CPT

## 2020-03-09 PROCEDURE — 85025 COMPLETE CBC W/AUTO DIFF WBC: CPT

## 2020-03-09 PROCEDURE — 85014 HEMATOCRIT: CPT

## 2020-03-09 PROCEDURE — 82330 ASSAY OF CALCIUM: CPT

## 2020-03-09 PROCEDURE — 63600175 PHARM REV CODE 636 W HCPCS: Performed by: INTERNAL MEDICINE

## 2020-03-09 PROCEDURE — 37799 UNLISTED PX VASCULAR SURGERY: CPT

## 2020-03-09 PROCEDURE — 99291 CRITICAL CARE FIRST HOUR: CPT | Mod: ,,, | Performed by: NURSE PRACTITIONER

## 2020-03-09 RX ORDER — POTASSIUM CHLORIDE 14.9 MG/ML
60 INJECTION INTRAVENOUS
Status: DISCONTINUED | OUTPATIENT
Start: 2020-03-09 | End: 2020-03-10

## 2020-03-09 RX ORDER — MAGNESIUM SULFATE HEPTAHYDRATE 40 MG/ML
4 INJECTION, SOLUTION INTRAVENOUS
Status: DISCONTINUED | OUTPATIENT
Start: 2020-03-09 | End: 2020-03-10

## 2020-03-09 RX ORDER — MAGNESIUM SULFATE HEPTAHYDRATE 40 MG/ML
2 INJECTION, SOLUTION INTRAVENOUS
Status: DISCONTINUED | OUTPATIENT
Start: 2020-03-09 | End: 2020-03-10

## 2020-03-09 RX ORDER — POTASSIUM CHLORIDE 29.8 MG/ML
40 INJECTION INTRAVENOUS
Status: DISCONTINUED | OUTPATIENT
Start: 2020-03-09 | End: 2020-03-10

## 2020-03-09 RX ORDER — POTASSIUM CHLORIDE 29.8 MG/ML
80 INJECTION INTRAVENOUS
Status: DISCONTINUED | OUTPATIENT
Start: 2020-03-09 | End: 2020-03-10

## 2020-03-09 RX ADMIN — PIPERACILLIN AND TAZOBACTAM 4.5 G: 4; .5 INJECTION, POWDER, LYOPHILIZED, FOR SOLUTION INTRAVENOUS; PARENTERAL at 06:03

## 2020-03-09 RX ADMIN — LEVETIRACETAM 500 MG: 5 INJECTION INTRAVENOUS at 09:03

## 2020-03-09 RX ADMIN — LEVETIRACETAM 500 MG: 5 INJECTION INTRAVENOUS at 08:03

## 2020-03-09 RX ADMIN — PROPOFOL 20 MCG/KG/MIN: 10 INJECTION, EMULSION INTRAVENOUS at 02:03

## 2020-03-09 RX ADMIN — DEXTROSE 8 MG/HR: 50 INJECTION, SOLUTION INTRAVENOUS at 11:03

## 2020-03-09 RX ADMIN — CALCIUM GLUCONATE 1000 MG: 94 INJECTION, SOLUTION INTRAVENOUS at 06:03

## 2020-03-09 RX ADMIN — POTASSIUM CHLORIDE 60 MEQ: 200 INJECTION, SOLUTION INTRAVENOUS at 06:03

## 2020-03-09 RX ADMIN — MAGNESIUM SULFATE 2 G: 2 INJECTION INTRAVENOUS at 06:03

## 2020-03-09 RX ADMIN — DEXTROSE 8 MG/HR: 50 INJECTION, SOLUTION INTRAVENOUS at 12:03

## 2020-03-09 RX ADMIN — DEXTROSE 8 MG/HR: 50 INJECTION, SOLUTION INTRAVENOUS at 09:03

## 2020-03-09 RX ADMIN — DEXTROSE 8 MG/HR: 50 INJECTION, SOLUTION INTRAVENOUS at 03:03

## 2020-03-09 RX ADMIN — PIPERACILLIN AND TAZOBACTAM 4.5 G: 4; .5 INJECTION, POWDER, LYOPHILIZED, FOR SOLUTION INTRAVENOUS; PARENTERAL at 12:03

## 2020-03-09 RX ADMIN — DEXTROSE 8 MG/HR: 50 INJECTION, SOLUTION INTRAVENOUS at 06:03

## 2020-03-09 NOTE — ASSESSMENT & PLAN NOTE
S/P coiling of gastric artery per IR post fail attempt at Epi inj via EGD  H/H Q 6 hours  Monitor for bleeding  PPI infusion & IVFs  No transfusion indicated this AM  Family does not wish to pursue surgical intervention if re bleeding occurs.

## 2020-03-09 NOTE — PROGRESS NOTES
"[facility] Hospital Medicine  Progress Note    Patient Name: Adebayo Oneil  MRN: [id number]  Patient Class: IP- Inpatient   Admission Date: 3/7/2020  Length of Stay: 1 days  Attending Physician: Joao Cano MD  Primary Care Provider: Provider Notinsystem        Subjective:     Principal Problem:Acute upper GI bleed        HPI:  Adebayo Oneil is a 69 y.o. 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 Pt was unresponsive at 9:30 this morning and [facility] was called. Upon AMS arrival pt was responsive but mumbled his speech and was "more confused" than baseline. Pt was discharged yesterday with hytrin. [facility] states that pt's blood pressure was 75/43 upon arrival and 80/35 en route. Associated sxs include confusion.   In the ED, pt was initial hypotensive, WBCs 23.34,  hemoglobin of 6.6, creatinine of 1.3 and BUN of 59, albumin of 2.4 and lactic acid of 3.7. CT head negative for acute findings. CT abdomen showed prominent elevation of the right diaphragm with compressive atelectasis right lung base. Aneurysmal dilatation of the aortic root measuring 4 cm. Constipation with little high-grade rectal fecal impaction. Abdominal aortic ectasia with maximum luminal diameter 3.1 cm. His code status is noted to be DNR and Danni Howell is his power of .   He will be kept on OBS for lactic acidosis under the care of Hospital Medicine    Overview/Hospital Course:  3/8:  Patient underwent EGD by GI this a.m. bleeding ulcers noted.  She acute bleeding noted during procedure which required intervention " radiology consult.  Angiography was performed and bleeding was stopped with coils and particles.  Patient was intubated during procedure.  Rapid transfusion protocol was initiated due to severity of blood loss.  Patient was transferred to ICU.    3/9:  H&H is stable today.  Patient was extubated this morning.  Will need further monitoring in ICU.  Patient was lethargic and not really responding this morning will need to clarify with power of  all mental status at baseline.    Interval History:  No acute events reported overnight.  Patient was extubated this morning.  Currently tolerating extubation well.    Review of Systems   Unable to perform ROS: Mental status change     Objective:     Vital Signs (Most Recent):  Temp: 97.9 °F (36.6 °C) (03/09/20 0701)  Pulse: 64 (03/09/20 1100)  Resp: 14 (03/09/20 1100)  BP: (!) 108/54 (03/09/20 1100)  SpO2: 99 % (03/09/20 1100) Vital Signs (24h Range):  Temp:  [97.2 °F (36.2 °C)-98.4 °F (36.9 °C)] 97.9 °F (36.6 °C)  Pulse:  [63-81] 64  Resp:  [10-21] 14  SpO2:  [97 %-100 %] 99 %  BP: ()/() 108/54     Weight: 64.9 kg (143 lb 1.3 oz)  Body mass index is 20.53 kg/m².    Intake/Output Summary (Last 24 hours) at 3/9/2020 1128  Last data filed at 3/9/2020 1000  Gross per 24 hour   Intake 5570 ml   Output 1919 ml   Net 3651 ml      Physical Exam   Constitutional: No distress.   Chronically ill appearing    HENT:   Head: Normocephalic and atraumatic.   Cardiovascular: Normal rate, regular rhythm and normal heart sounds. Exam reveals no gallop and no friction rub.   No murmur heard.  Pulmonary/Chest: Effort normal and breath sounds normal. No stridor. No respiratory distress. He has no wheezes. He has no rales.   Abdominal: Soft. Bowel sounds are normal. He exhibits no distension and no mass. There is no tenderness. There is no guarding.   Musculoskeletal: He exhibits no edema.   Neurological:   Currently not responsive, not on sedation   Skin: He is not diaphoretic.        Significant Labs:   Recent Lab Results       03/09/20  0446   03/09/20  0401   03/09/20  0007   03/08/20 2004 03/08/20  1537        Albumin   1.8           Alkaline Phosphatase   49           Allens Test N/A             ALT   5           Anion Gap   9           Appearance, UA               AST   18           Baso #   0.03           Basophil%   0.3           Bilirubin (UA)               BILIRUBIN TOTAL   0.4  Comment:  For infants and newborns, interpretation of results should be based  on gestational age, weight and in agreement with clinical  observations.  Premature Infant recommended reference ranges:  Up to 24 hours.............<8.0 mg/dL  Up to 48 hours............<12.0 mg/dL  3-5 days..................<15.0 mg/dL  6-29 days.................<15.0 mg/dL             Site Ana/UAC             BUN, Bld   53           Calcium   6.9  Comment:  CA  critical result(s) called and verbal readback obtained from   KATHARINE SPENCER RN by LAURA 03/09/2020 04:46             Chloride   116           CO2   21           Color, UA               Creatinine   0.8           DelSys Adult Vent             Differential Method   Automated           eGFR if    >60           eGFR if non    >60  Comment:  Calculation used to obtain the estimated glomerular filtration  rate (eGFR) is the CKD-EPI equation.              Eos #   0.3           Eosinophil%   2.7           Fibrinogen   370     245     FiO2 35             Glucose   95           Glucose, UA               Gran # (ANC)   7.0           Gran%   69.6           Hematocrit   27.5 28.0 29.2          27.5           Hemoglobin   9.7 9.9 10.4  Comment:  Results confirmed, test repeated 9.3        9.7           Immature Grans (Abs)   0.06  Comment:  Mild elevation in immature granulocytes is non specific and   can be seen in a variety of conditions including stress response,   acute inflammation, trauma and pregnancy. Correlation with other   laboratory  and clinical findings is essential.             Immature Granulocytes   0.6           INR   1.1  Comment:  Coumadin Therapy:  2.0 - 3.0 for INR for all indicators except mechanical heart valves  and antiphospholipid syndromes which should use 2.5 - 3.5.             IP 16             IT .84             Ketones, UA               Lactate, Scott   1.0  Comment:  Falsely low lactic acid results can be found in samples   containing >=13.0 mg/dL total bilirubin and/or >=3.5 mg/dL   direct bilirubin.             Leukocytes, UA               Lymph #   1.1           Lymph%   10.8           Magnesium   1.8           MCH   32.3           MCHC   35.3           MCV   92           Mode PCV             Mono #   1.6           Mono%   16.0           MPV   10.7           NITRITE UA               nRBC   1           Occult Blood UA               PEEP 8             pH, UA               PiP 24             Platelets   138           POC BE -2             POC Glucose               POC HCO3 22.7             POC Hematocrit               POC Ionized Calcium               POC PCO2 35.0             POC PH 7.419             POC PO2 163             POC Potassium               POC SATURATED O2 100             POC Sodium               Potassium   3.2           PROTEIN TOTAL   3.9           Protein, UA               Protime   11.2           Rate 16             RBC   3.00           RDW   17.7           Sample ARTERIAL             Sodium   146           Specific [place], UA               Specimen UA               UROBILINOGEN UA               WBC   10.08                            03/08/20  1529   03/08/20  1258   03/08/20  1239        Albumin           Alkaline Phosphatase           Allens Test     N/A     ALT           Anion Gap   9       Appearance, UA Clear         AST           Baso #           Basophil%           Bilirubin (UA) Negative         BILIRUBIN TOTAL           Site     Ana/UAC     BUN, Bld   50       Calcium   6.6  Comment:  CA critical  result(s) called and verbal readback obtained from Nuno Ling RN by TMW2 03/08/2020 14:29         Chloride   115       CO2   20       Color, UA Yellow         Creatinine   1.0       DelSys     Adult Vent     Differential Method           eGFR if    >60       eGFR if non    >60  Comment:  Calculation used to obtain the estimated glomerular filtration  rate (eGFR) is the CKD-EPI equation.          Eos #           Eosinophil%           Fibrinogen           FiO2     50     Glucose   182       Glucose, UA Negative         Gran # (ANC)           Gran%           Hematocrit   16.9  Comment:  HCT critical result(s) called and verbal readback obtained from   SADIE NUNEZ RN by JLM 03/08/2020 13:23         Hemoglobin   6.1       Immature Grans (Abs)           Immature Granulocytes           INR           IP           IT     0.8     Ketones, UA Negative         Lactate, Scott   2.7  Comment:  Falsely low lactic acid results can be found in samples   containing >=13.0 mg/dL total bilirubin and/or >=3.5 mg/dL   direct bilirubin.         Leukocytes, UA Negative         Lymph #           Lymph%           Magnesium   1.8       MCH           MCHC           MCV           Mode     PCV     Mono #           Mono%           MPV           NITRITE UA Negative         nRBC           Occult Blood UA Negative         PEEP     5     pH, UA 5.0         PiP     16     Platelets           POC BE     -1     POC Glucose     186     POC HCO3     23.4     POC Hematocrit     <15     POC Ionized Calcium     1.01     POC PCO2     34.4     POC PH     7.439     POC PO2     258     POC Potassium     3.1     POC SATURATED O2     100     POC Sodium     147     Potassium   3.4       PROTEIN TOTAL           Protein, UA Negative  Comment:  Recommend a 24 hour urine protein or a urine   protein/creatinine ratio if globulin induced proteinuria is  clinically suspected.           Protime           Rate     16     RBC           RDW            Sample     ARTERIAL     Sodium   144       Specific [place], UA 1.020         Specimen UA Urine, Catheterized         UROBILINOGEN UA Negative         WBC               All pertinent labs within the past 24 hours have been reviewed.    Significant Imaging: I have reviewed all pertinent imaging results/findings within the past 24 hours.      Assessment/Plan:      * Acute upper GI bleed of gastric artery  3/8:  Continue to transfuse packed RBCs as needed  Goal to keep hemoglobin above 8  Continue to monitor H&H every 4-6 hours  IR was successful in cauterizing and bleed of short gastric arteries  GI successful in cauterizing and gastric ulcer bleed  Continue Protonix drip    3/9:  Hemoglobin stable this morning  Will continue to monitor hemoglobin every 6-8 hours  Transfuse as needed   continue Protonix drip    On mechanically assisted ventilation  3/8:  Currently intubated  Was intubated during egd  Of note patient is DNR  Will plan wean vent as tolerated  Plan extubated soon as possible    3/9:  Extubated this morning  Currently tolerating extubation  Will continue to monitor in ICU    Shock circulatory  3/8:  Currently on Levophed  Continue blood transfusions as indicated  Shock likely secondary to acute blood loss    3/9:  Patient currently off of Levophed  Likely cause of shock was secondary to acute blood loss  Continue to monitor    Leukocytosis  --unsure of source  --IV zosyn empirically  --IVF's  --monitor    3/8:  Will continue Zosyn for now  Blood cultures pending  No source of infection  Plan to DC/de-escalate abx therapy tomorrow    3/9:  Currently on Zosyn  Blood cultures no growth today  Leukocytosis resolved  Still unclear on source if any possible infection  We discussed with ICU on stopping antibiotics    Essential hypertension  Bp stable   Hold Hytrin   Monitor       Hyperlipidemia, mixed  Continue Statin       Other seizures  Continue Keppra.  Seizure precautions     3/9:  Continue  Keppra  Continue to monitor        Parkinson disease  3/9:  Home medication restarted      Lactic acidosis  --IVF's  --repeat lactic pending  --monitor    3/8:  Lactic acid is still elevated  Likely secondary to drop in H&H along with hypotension  Will continue to monitor  Plan to repeat lactate acid    3/9:  Resolved      Acute blood loss anemia  Transfuse 2 units PRBCs.   -Monitor hemoglobin and hematocrit.         VTE Risk Mitigation (From admission, onward)         Ordered     Place sequential compression device  Until discontinued      03/08/20 1249     IP VTE HIGH RISK PATIENT  Once      03/08/20 1249     Place KISHORE hose  Until discontinued      03/07/20 1741                Critical care time spent on the evaluation and treatment of severe organ dysfunction, review of pertinent labs and imaging studies, discussions with consulting providers and discussions with patient/family: 40 minutes.      Joao Cano MD  Department of Hospital Medicine   [facility]

## 2020-03-09 NOTE — ASSESSMENT & PLAN NOTE
Patient has received 11 PRBCs this admission, FFP, Plts, and Cryo.  H&H is stable this AM with no active signs of bleeding.   H&H Q 6 hours  Monitor closely for bleeding and follow coags.

## 2020-03-09 NOTE — ASSESSMENT & PLAN NOTE
S/p EGD and IR embolization with stabilization of clinical status.   Continue Protonix IV.   Continue to monitor H/H.

## 2020-03-09 NOTE — SUBJECTIVE & OBJECTIVE
Interval History:  No acute events reported overnight.  Patient was extubated this morning.  Currently tolerating extubation well.    Review of Systems   Unable to perform ROS: Mental status change     Objective:     Vital Signs (Most Recent):  Temp: 97.9 °F (36.6 °C) (03/09/20 0701)  Pulse: 64 (03/09/20 1100)  Resp: 14 (03/09/20 1100)  BP: (!) 108/54 (03/09/20 1100)  SpO2: 99 % (03/09/20 1100) Vital Signs (24h Range):  Temp:  [97.2 °F (36.2 °C)-98.4 °F (36.9 °C)] 97.9 °F (36.6 °C)  Pulse:  [63-81] 64  Resp:  [10-21] 14  SpO2:  [97 %-100 %] 99 %  BP: ()/() 108/54     Weight: 64.9 kg (143 lb 1.3 oz)  Body mass index is 20.53 kg/m².    Intake/Output Summary (Last 24 hours) at 3/9/2020 1128  Last data filed at 3/9/2020 1000  Gross per 24 hour   Intake 5570 ml   Output 1919 ml   Net 3651 ml      Physical Exam   Constitutional: No distress.   Chronically ill appearing    HENT:   Head: Normocephalic and atraumatic.   Cardiovascular: Normal rate, regular rhythm and normal heart sounds. Exam reveals no gallop and no friction rub.   No murmur heard.  Pulmonary/Chest: Effort normal and breath sounds normal. No stridor. No respiratory distress. He has no wheezes. He has no rales.   Abdominal: Soft. Bowel sounds are normal. He exhibits no distension and no mass. There is no tenderness. There is no guarding.   Musculoskeletal: He exhibits no edema.   Neurological:   Currently not responsive, not on sedation   Skin: He is not diaphoretic.       Significant Labs:   Recent Lab Results       03/09/20  0446   03/09/20  0401   03/09/20  0007   03/08/20  2004   03/08/20  1537        Albumin   1.8           Alkaline Phosphatase   49           Allens Test N/A             ALT   5           Anion Gap   9           Appearance, UA               AST   18           Baso #   0.03           Basophil%   0.3           Bilirubin (UA)               BILIRUBIN TOTAL   0.4  Comment:  For infants and newborns, interpretation of results  should be based  on gestational age, weight and in agreement with clinical  observations.  Premature Infant recommended reference ranges:  Up to 24 hours.............<8.0 mg/dL  Up to 48 hours............<12.0 mg/dL  3-5 days..................<15.0 mg/dL  6-29 days.................<15.0 mg/dL             Site [place]             BUN, Bld   53           Calcium   6.9  Comment:  CA  critical result(s) called and verbal readback obtained from   KATHARINE SPENCER RN by TAMIKO 03/09/2020 04:46             Chloride   116           CO2   21           Color, UA               Creatinine   0.8           DelSys Adult Vent             Differential Method   Automated           eGFR if    >60           eGFR if non    >60  Comment:  Calculation used to obtain the estimated glomerular filtration  rate (eGFR) is the CKD-EPI equation.              Eos #   0.3           Eosinophil%   2.7           Fibrinogen   370     245     FiO2 35             Glucose   95           Glucose, UA               Gran # (ANC)   7.0           Gran%   69.6           Hematocrit   27.5 28.0 29.2          27.5           Hemoglobin   9.7 9.9 10.4  Comment:  Results confirmed, test repeated 9.3        9.7           Immature Grans (Abs)   0.06  Comment:  Mild elevation in immature granulocytes is non specific and   can be seen in a variety of conditions including stress response,   acute inflammation, trauma and pregnancy. Correlation with other   laboratory and clinical findings is essential.             Immature Granulocytes   0.6           INR   1.1  Comment:  Coumadin Therapy:  2.0 - 3.0 for INR for all indicators except mechanical heart valves  and antiphospholipid syndromes which should use 2.5 - 3.5.             IP 16             IT .84             Ketones, UA               Lactate, Scott   1.0  Comment:  Falsely low lactic acid results can be found in samples   containing >=13.0 mg/dL total bilirubin and/or >=3.5 mg/dL    direct bilirubin.             Leukocytes, UA               Lymph #   1.1           Lymph%   10.8           Magnesium   1.8           MCH   32.3           MCHC   35.3           MCV   92           Mode PCV             Mono #   1.6           Mono%   16.0           MPV   10.7           NITRITE UA               nRBC   1           Occult Blood UA               PEEP 8             pH, UA               PiP 24             Platelets   138           POC BE -2             POC Glucose               POC HCO3 22.7             POC Hematocrit               POC Ionized Calcium               POC PCO2 35.0             POC PH 7.419             POC PO2 163             POC Potassium               POC SATURATED O2 100             POC Sodium               Potassium   3.2           PROTEIN TOTAL   3.9           Protein, UA               Protime   11.2           Rate 16             RBC   3.00           RDW   17.7           Sample ARTERIAL             Sodium   146           Specific [place], UA               Specimen UA               UROBILINOGEN UA               WBC   10.08                            03/08/20  1529   03/08/20  1258   03/08/20  1239        Albumin           Alkaline Phosphatase           Allens Test     N/A     ALT           Anion Gap   9       Appearance, UA Clear         AST           Baso #           Basophil%           Bilirubin (UA) Negative         BILIRUBIN TOTAL           Site     Ana/UAC     BUN, Bld   50       Calcium   6.6  Comment:  CA critical result(s) called and verbal readback obtained from Nuno Ling RN by TMW2 03/08/2020 14:29         Chloride   115       CO2   20       Color, UA Yellow         Creatinine   1.0       DelSys     Adult Vent     Differential Method           eGFR if    >60       eGFR if non    >60  Comment:  Calculation used to obtain the estimated glomerular filtration  rate (eGFR) is the CKD-EPI equation.          Eos #           Eosinophil%            Fibrinogen           FiO2     50     Glucose   182       Glucose, UA Negative         Gran # (ANC)           Gran%           Hematocrit   16.9  Comment:  HCT critical result(s) called and verbal readback obtained from   SADIE NUNEZ RN by MUNDO 03/08/2020 13:23         Hemoglobin   6.1       Immature Grans (Abs)           Immature Granulocytes           INR           IP           IT     0.8     Ketones, UA Negative         Lactate, Scott   2.7  Comment:  Falsely low lactic acid results can be found in samples   containing >=13.0 mg/dL total bilirubin and/or >=3.5 mg/dL   direct bilirubin.         Leukocytes, UA Negative         Lymph #           Lymph%           Magnesium   1.8       MCH           MCHC           MCV           Mode     PCV     Mono #           Mono%           MPV           NITRITE UA Negative         nRBC           Occult Blood UA Negative         PEEP     5     pH, UA 5.0         PiP     16     Platelets           POC BE     -1     POC Glucose     186     POC HCO3     23.4     POC Hematocrit     <15     POC Ionized Calcium     1.01     POC PCO2     34.4     POC PH     7.439     POC PO2     258     POC Potassium     3.1     POC SATURATED O2     100     POC Sodium     147     Potassium   3.4       PROTEIN TOTAL           Protein, UA Negative  Comment:  Recommend a 24 hour urine protein or a urine   protein/creatinine ratio if globulin induced proteinuria is  clinically suspected.           Protime           Rate     16     RBC           RDW           Sample     ARTERIAL     Sodium   144       Specific [place], UA 1.020         Specimen UA Urine, Catheterized         UROBILINOGEN UA Negative         WBC               All pertinent labs within the past 24 hours have been reviewed.    Significant Imaging: I have reviewed all pertinent imaging results/findings within the past 24 hours.

## 2020-03-09 NOTE — ASSESSMENT & PLAN NOTE
3/8:  Continue to transfuse packed RBCs as needed  Goal to keep hemoglobin above 8  Continue to monitor H&H every 4-6 hours  IR was successful in cauterizing and bleed of short gastric arteries  GI successful in cauterizing and gastric ulcer bleed  Continue Protonix drip    3/9:  Hemoglobin stable this morning  Will continue to monitor hemoglobin every 6-8 hours  Transfuse as needed   continue Protonix drip

## 2020-03-09 NOTE — PLAN OF CARE
NEURO: RASS -2, goal -2. Prop @ 20.    CARDIO: Rhythm NSR. Pulses palpable. BP stable off levophed gtt.   RESP: Intubated, on vent. O2 sats 100%.   GI: Voids per kauffman. Urine output 60s/hr.  : Abdomen flat, soft. Bowel sounds active. Large bowel movement x2, brown, soft. OGT output bright red. Protonix gtt at 8mg/hr.   SKIN: Pt turned Q2, heels floated.  Plan of care  discussed w/patient, needs reinforcement. Fall precautions in place, bed alarm on. Monitor alarms audible. Will report to oncoming RN.

## 2020-03-09 NOTE — ASSESSMENT & PLAN NOTE
--unsure of source  --IV zosyn empirically  --IVF's  --monitor    3/8:  Will continue Zosyn for now  Blood cultures pending  No source of infection  Plan to DC/de-escalate abx therapy tomorrow    3/9:  Currently on Zosyn  Blood cultures no growth today  Leukocytosis resolved  Still unclear on source if any possible infection  We discussed with ICU on stopping antibiotics

## 2020-03-09 NOTE — SUBJECTIVE & OBJECTIVE
Review of Systems   Unable to perform ROS: Intubated       Objective:     Vital Signs (Most Recent):  Temp: 97.9 °F (36.6 °C) (03/09/20 0701)  Pulse: 69 (03/09/20 0800)  Resp: 13 (03/09/20 0800)  BP: 95/60 (03/09/20 0800)  SpO2: 100 % (03/09/20 0800) Vital Signs (24h Range):  Temp:  [96.8 °F (36 °C)-98.4 °F (36.9 °C)] 97.9 °F (36.6 °C)  Pulse:  [63-94] 69  Resp:  [12-21] 13  SpO2:  [97 %-100 %] 100 %  BP: ()/() 95/60     Weight: 64.9 kg (143 lb 1.3 oz)  Body mass index is 20.53 kg/m².      Intake/Output Summary (Last 24 hours) at 3/9/2020 0850  Last data filed at 3/9/2020 0800  Gross per 24 hour   Intake 7216 ml   Output 3694 ml   Net 3522 ml       Physical Exam   Constitutional: He appears well-developed and well-nourished. He appears lethargic.  Non-toxic appearance. He has a sickly appearance. He appears ill. No distress. He is sedated, intubated and restrained.   HENT:   Head: Normocephalic and atraumatic.   Mouth/Throat: Oropharynx is clear and moist and mucous membranes are normal.   Eyes: Pupils are equal, round, and reactive to light. Conjunctivae, EOM and lids are normal.   Neck: Trachea normal. Neck supple. No JVD present. Carotid bruit is not present.   Cardiovascular: Normal rate, regular rhythm and normal heart sounds. Exam reveals no gallop and no friction rub.   No murmur heard.  Pulses:       Radial pulses are 2+ on the right side, and 2+ on the left side.        Dorsalis pedis pulses are 1+ on the right side, and 1+ on the left side.   Pulmonary/Chest: Effort normal and breath sounds normal. No accessory muscle usage. No tachypnea. He is intubated. No respiratory distress.   Abdominal: Soft. Normal appearance and bowel sounds are normal. He exhibits no distension. There is no tenderness.   Genitourinary: Penis normal.   Genitourinary Comments: Adhikari in place   Musculoskeletal:        Right foot: There is no deformity.        Left foot: There is no deformity.   No edema   Lymphadenopathy:      He has no cervical adenopathy.     He has no axillary adenopathy.   Neurological: He appears lethargic. GCS eye subscore is 2. GCS verbal subscore is 1. GCS motor subscore is 5.   Skin: Skin is warm, dry and intact. Capillary refill takes less than 2 seconds. No rash noted. No cyanosis.        Pale       Vents:  Vent Mode: Spont (03/09/20 0741)  Ventilator Initiated: Yes (03/08/20 1037)  Set Rate: 0 BPM (03/09/20 0741)  Vt Set: 0 mL (03/09/20 0741)  Pressure Support: 8 cmH20 (03/09/20 0741)  PEEP/CPAP: 5 cmH20 (03/09/20 0741)  Oxygen Concentration (%): 32 (03/09/20 0750)  Peak Airway Pressure: 13 cmH2O (03/09/20 0741)  Plateau Pressure: 0 cmH20 (03/09/20 0741)  Total Ve: 11.2 mL (03/09/20 0741)  F/VT Ratio<105 (RSBI): (!) 12.62 (03/09/20 0741)    Lines/Drains/Airways     Central Venous Catheter Line            Percutaneous Central Line Insertion/Assessment - Triple Lumen  03/08/20 left femoral vein 1 day          Drain                 NG/OG Tube 03/08/20 1 day         Sheath 03/08/20 Right 1 day         Urethral Catheter 03/08/20 1 day          Peripheral Intravenous Line                 Peripheral IV - Single Lumen 03/07/20 1308 18 G Left Antecubital 1 day         Peripheral IV - Single Lumen 03/07/20 1350 20 G Right Forearm 1 day         Peripheral IV - Single Lumen 03/08/20 0918 18 G Left Wrist less than 1 day                Significant Labs:    CBC/Anemia Profile:  Recent Labs   Lab 03/08/20  0746 03/08/20  1128  03/08/20  2004 03/09/20  0007 03/09/20  0401   WBC 11.25 13.74*  --   --   --  10.08   HGB 6.6* 7.3*   < > 10.4* 9.9* 9.7*  9.7*   HCT 21.1* 21.7*   < > 29.2* 28.0* 27.5*  27.5*    197  --   --   --  138*   MCV 92 91  --   --   --  92   RDW 16.7* 16.4*  --   --   --  17.7*    < > = values in this interval not displayed.        Chemistries:  Recent Labs   Lab 03/07/20  1251 03/08/20  0413 03/08/20  1258 03/09/20  0401   * 147* 144 146*   K 4.4 4.1 3.4* 3.2*   * 114* 115* 116*    CO2 18* 20* 20* 21*   BUN 59* 62* 50* 53*   CREATININE 1.3 1.1 1.0 0.8   CALCIUM 8.0* 7.7* 6.6* 6.9*   ALBUMIN 2.4*  --   --  1.8*   PROT 5.2*  --   --  3.9*   BILITOT 0.2  --   --  0.4   ALKPHOS 81  --   --  49*   ALT 15  --   --  5*   AST 16  --   --  18   MG  --   --  1.8 1.8       ABGs:   Recent Labs   Lab 03/09/20  0446   PH 7.419   PCO2 35.0   HCO3 22.7*   POCSATURATED 100   BE -2     Coagulation:   Recent Labs   Lab 03/09/20  0401   INR 1.1     Lactic Acid:   Recent Labs   Lab 03/07/20  1902 03/08/20  1258 03/09/20  0401   LACTATE 2.2 2.7* 1.0     Urine Studies:   Recent Labs   Lab 03/08/20  1529   COLORU Yellow   APPEARANCEUA Clear   PHUR 5.0   SPECGRAV 1.020   PROTEINUA Negative   GLUCUA Negative   KETONESU Negative   BILIRUBINUA Negative   OCCULTUA Negative   NITRITE Negative   UROBILINOGEN Negative   LEUKOCYTESUR Negative     All pertinent labs within the past 24 hours have been reviewed.    Significant Imaging:  CXR: I have reviewed all pertinent results/findings within the past 24 hours and my personal findings are:  Unchanged appearance of the chest compared to 03/08/2020.

## 2020-03-09 NOTE — NURSING
Pt transferred to ICU 4 from cath lab via bed. Report received from cath lab nurses. Pt was soiled upon arrival. Pt cleaned and sheets changed. Pt's OG tube was almost completely out of mouth. OG removed.  Pt's mouth had moderate amount of blood pouring out. Pt suctioned orally and through ET. New OG placed and placement verified by air bolus, content characteristics, and CXR. OG placed to low intermittent suction. Femoral sheath transduced for arterial pressures. Emergent CVC placed at bedside by Dawood Gonzalez NP. Pt on lore gtt upon arrival. VSS. Restraints were placed for pt's protection from line removal @ 1247. Pt non-responsive at this time - medically sedated and paralyzed in cath lab. Adhikari catheter also placed at bedside by Nallely Vargas RN.

## 2020-03-09 NOTE — PROGRESS NOTES
03/09/20 1015        Wound 03/09/20 Moisture associated dermatitis posterior Scrotum   Date First Assessed: 03/09/20   Primary Wound Type: Moisture associated dermatitis  Orientation: posterior  Location: Scrotum   Wound WDL ex   Dressing Appearance Open to air   Drainage Amount Scant   Drainage Characteristics/Odor Serous   Appearance Red;Moist   Tissue loss description Partial thickness   Wound Edges Open   Wound Length (cm) 1.5 cm   Wound Width (cm) 1.5 cm   Wound Depth (cm) 0.1 cm   Wound Volume (cm^3) 0.22 cm^3   Wound Surface Area (cm^2) 2.25 cm^2   Care Cleansed with:;Soap and water;Applied:;Skin Barrier  (critic aid paste)     High risk skin screening performed on this 70 y/o M patient due to HAPI bundle trigger, IV pressors. He is on waffle mattress overlay, heel offloading boots intact. Heels intact with no redness or breakdown noted. He has a healing wound to right medial great toe, left BIRD. Patient turned to left side with max assistance. Sacrum, coccyx, bilateral buttock intact with no breakdown or redness. Large melanous stool noted, incontinence care provided with bath wipes. MASD noted to posterior scrotum with moist red wound bed, partial thickness tissue loss, scar tissue to edges. Cleansed with bath wipes and thick layer critic aid paste moisture barrier applied. Patient then positioned with foam wedge. Please see below for wound care recommendations:    MASD scrotum:  1. Cleanse with bath wipes gently  2. Pat dry  3. Apply thin layer critic aid paste moisture barrier BID  4. Limit the amount of linen/underpad between patient and mattress surface to ONE fitted sheet and ONE covidien incontinence pad - NO DIAPERS  5. Obtain foam wedge from materials management to assist with maintaining proper position changes at least q 2 hours and document actual position in EPIC q 2 hours

## 2020-03-09 NOTE — PROGRESS NOTES
[facility]  Gastroenterology  Progress Note    Patient Name: Adebayo Oneil  MRN: [id number]  Admission Date: 3/7/2020  Hospital Length of Stay: 1 days  Code Status: DNR   Attending Provider: Joao Cano MD  Consulting Provider: Elie Cooley PA-C  Primary Care Physician: Provider Notinsystem  Principal Problem: Acute upper GI bleed      Subjective:     Interval History:   The patient is s/p EGD which was unsuccessful at controlling bleeding. He had IR embolization which was successful. Hgb stable at 9.9. Patient just extubated a few minutes ago. Off Levophed since yesterday evening. Currently with melena.     Review of Systems   Constitutional:        See Interval History for daily ROS.      Objective:     Vital Signs (Most Recent):  Temp: 97.4 °F (36.3 °C) (03/09/20 0305)  Pulse: 70 (03/09/20 0750)  Resp: 12 (03/09/20 0750)  BP: (!) 76/46 (03/09/20 0605)  SpO2: 100 % (03/09/20 0750) Vital Signs (24h Range):  Temp:  [96.8 °F (36 °C)-98.4 °F (36.9 °C)] 97.4 °F (36.3 °C)  Pulse:  [64-94] 70  Resp:  [12-21] 12  SpO2:  [97 %-100 %] 100 %  BP: ()/() 76/46     Weight: 64.9 kg (143 lb 1.3 oz) (03/09/20 0505)  Body mass index is 20.53 kg/m².      Intake/Output Summary (Last 24 hours) at 3/9/2020 0824  Last data filed at 3/9/2020 0629  Gross per 24 hour   Intake 7096 ml   Output 3549 ml   Net 3547 ml       Lines/Drains/Airways     Central Venous Catheter Line            Percutaneous Central Line Insertion/Assessment - Triple Lumen  03/08/20 left femoral vein 1 day          Drain                 NG/OG Tube 03/08/20 1 day         Sheath 03/08/20 Right 1 day         Urethral Catheter 03/08/20 1 day          Peripheral Intravenous Line                 Peripheral IV - Single Lumen 03/07/20 1308 18 G Left Antecubital 1 day         Peripheral IV - Single Lumen 03/07/20 1350 20 G Right Forearm 1 day         Peripheral IV - Single Lumen 03/08/20 0918 18 G Left Wrist less than 1 day                Physical  Exam   Constitutional: He appears well-developed. No distress.   HENT:   Head: Normocephalic and atraumatic.   Cardiovascular: Normal rate and regular rhythm.   Pulmonary/Chest: Effort normal and breath sounds normal. No respiratory distress. He has no wheezes.   Abdominal: Soft. Bowel sounds are normal. He exhibits no distension. There is no tenderness.   Neurological:   Just extubated. Doesn't open eyes or follow commands.    Skin: He is not diaphoretic.       Significant Labs:  CBC:   Recent Labs   Lab 03/08/20  0746 03/08/20  1128  03/08/20 2004 03/09/20  0007 03/09/20  0401   WBC 11.25 13.74*  --   --   --  10.08   HGB 6.6* 7.3*   < > 10.4* 9.9* 9.7*  9.7*   HCT 21.1* 21.7*   < > 29.2* 28.0* 27.5*  27.5*    197  --   --   --  138*    < > = values in this interval not displayed.     CMP:   Recent Labs   Lab 03/09/20  0401   GLU 95   CALCIUM 6.9*   ALBUMIN 1.8*   PROT 3.9*   *   K 3.2*   CO2 21*   *   BUN 53*   CREATININE 0.8   ALKPHOS 49*   ALT 5*   AST 18   BILITOT 0.4     Coagulation:   Recent Labs   Lab 03/09/20  0401   INR 1.1         Significant Imaging:  Imaging results within the past 24 hours have been reviewed.    Assessment/Plan:     * Acute upper GI bleed of gastric artery  S/p EGD and IR embolization with stabilization of clinical status.   Continue Protonix IV.   Continue to monitor H/H.     On mechanically assisted ventilation  Patient has been extubated.     Shock circulatory  BP stable off pressor support. He will remain in ICU today for monitoring.     Acute blood loss anemia  Hgb stable. Continue to monitor and transfuse additional units as needed.         Thank you for your consult. I will follow-up with patient. Please contact us if you have any additional questions.    Elie Cooley PA-C  Gastroenterology  [facility]

## 2020-03-09 NOTE — ASSESSMENT & PLAN NOTE
--IVF's  --repeat lactic pending  --monitor    3/8:  Lactic acid is still elevated  Likely secondary to drop in H&H along with hypotension  Will continue to monitor  Plan to repeat lactate acid    3/9:  Resolved

## 2020-03-09 NOTE — ASSESSMENT & PLAN NOTE
3/8:  Currently on Levophed  Continue blood transfusions as indicated  Shock likely secondary to acute blood loss    3/9:  Patient currently off of Levophed  Likely cause of shock was secondary to acute blood loss  Continue to monitor

## 2020-03-09 NOTE — ASSESSMENT & PLAN NOTE
Continue IVFs. Transfusion not indicated at this time.   Levophed weaned off  ICU hemodynamic monitoring

## 2020-03-09 NOTE — PROGRESS NOTES
[facility]  Critical Care Medicine  Progress Note    Patient Name: Adebayo Oneil  MRN: [id number]  Admission Date: 3/7/2020  Hospital Length of Stay: 1 days  Code Status: DNR  Attending Provider: Joao Cano MD  Primary Care Provider: Provider Notinsystem   Principal Problem: Acute upper GI bleed    Subjective:     HPI:  Mr Oneil is a elderly 68 yo WM with a PMH of AAA, ELDA, chronic constipation, HTN, GERD/Esophagitis, HLD, Seizure, Parkinon's disease and UC.  He is a resident at [facility] and DNR.  He was recently discharged on 3 days ago post admit for Upper GI bleed and ABL anemia undergoing EGD on 3/5 revealing non bleeding gastric ulcer with adherent clot.  He received 3 units PRBCs at that time and on day of discharge H/H 7.7/25 and home with PPI.  He returned to ED 3/7 via EMS about 1300 hr here at [facility] after being found unresponsive about 0930 hr yesterday morning.  Upon EMS arrival he was awake but lethargic and confused with mumbled speech and BP 75/43.  In ED BP 89/66, WBC 23, LA 3.7.  CT head unremarkable for acute process.  Also had CT chest/Abd/Pelvis revealing elevated right HD, RLL atelectasis and high grade impaction.  BP improved with IVFs and PRBCs and admitted with GI consult.  He received 2 units PRBCs yesterday afternoon.  Yesterday evening about 2145 hr had large clot hematemesis started on PPI and given another 2 units PRBCs.  This AM taken to Endo per GI undergoing EGD.  EGD this morning: The duodenal bulb and second portion of the duodenum were normal.       Two oozing cratered gastric ulcers with visible vessels in each        ulcer were found in the gastric fundus. The smaller ulcer was 20mm        in size and a adherent clot was appreciated. Clot removal with        forceps was attempted and successful. This ulcer was injected with        6cc of 1:10,000 solution of epinephrine and then treated with        thermal bipolar therapy. hemostasis was  achieved for the smaller        ulcer. The largest ulcer was 30 mm in largest dimension with two        large visible vessels. Area was unsuccessfully injected with 10 mL        of a 1:10,000 solution of epinephrine for hemostasis. Coagulation        for hemostasis using bipolar probe was unsuccessful. The ulcer's        vessel began to actively spurt with failed endoscopic attempts. The        stomach began to fill with bright red blood. Procedure was aborted        and anesthesia electively intubated the patient for airway control.        Two units of pRBCs were administered during this procedure.       No gross lesions were noted in the entire esophagus.  IR consulted and taken to cath lab revealing: Active bleed off short gastric artery off supperior trunk of the splenic artery which was embolized to stasis with coils and particles.  He received 4 more units PRBCs between Endo and IR per report as well as 1 Plt and FFP X 2.  Admitted from IR to ICU.       Hospital/ICU Course:  3/8 - Arrived to ICU supine and unresponsive intubated on mech ventilation still receiving FFP.  Right groin arterial sheath still in place and VSS on IVFs and Kaleb infusion.  He had OG replaced revealing blood and ABG w/ lytes revealed POCT Hct < 15.  3 more units PRBCs ordered emergently with Cryo and Kaleb changed to Levophed due to mild bradycardia.  Left femoral CL placed emergently and transducer connected to right femoral sheath for continuous arterial pressure monitoring.  Dr. Cano at bedside and case discussed in detail.    3/9- Supine in bed with no acute distress intubated. Patient placed on SBT this AM. Extubated this AM. Patient's H&H stable this AM with no plans to transfuse. Levophed infusion off last PM with stable hemodynamics.     Review of Systems   Unable to perform ROS: Intubated       Objective:     Vital Signs (Most Recent):  Temp: 97.9 °F (36.6 °C) (03/09/20 0701)  Pulse: 69 (03/09/20 0800)  Resp: 13 (03/09/20 0800)  BP:  95/60 (03/09/20 0800)  SpO2: 100 % (03/09/20 0800) Vital Signs (24h Range):  Temp:  [96.8 °F (36 °C)-98.4 °F (36.9 °C)] 97.9 °F (36.6 °C)  Pulse:  [63-94] 69  Resp:  [12-21] 13  SpO2:  [97 %-100 %] 100 %  BP: ()/() 95/60     Weight: 64.9 kg (143 lb 1.3 oz)  Body mass index is 20.53 kg/m².      Intake/Output Summary (Last 24 hours) at 3/9/2020 0850  Last data filed at 3/9/2020 0800  Gross per 24 hour   Intake 7216 ml   Output 3694 ml   Net 3522 ml       Physical Exam   Constitutional: He appears well-developed and well-nourished. He appears lethargic.  Non-toxic appearance. He has a sickly appearance. He appears ill. No distress. He is sedated, intubated and restrained.   HENT:   Head: Normocephalic and atraumatic.   Mouth/Throat: Oropharynx is clear and moist and mucous membranes are normal.   Eyes: Pupils are equal, round, and reactive to light. Conjunctivae, EOM and lids are normal.   Neck: Trachea normal. Neck supple. No JVD present. Carotid bruit is not present.   Cardiovascular: Normal rate, regular rhythm and normal heart sounds. Exam reveals no gallop and no friction rub.   No murmur heard.  Pulses:       Radial pulses are 2+ on the right side, and 2+ on the left side.        Dorsalis pedis pulses are 1+ on the right side, and 1+ on the left side.   Pulmonary/Chest: Effort normal and breath sounds normal. No accessory muscle usage. No tachypnea. He is intubated. No respiratory distress.   Abdominal: Soft. Normal appearance and bowel sounds are normal. He exhibits no distension. There is no tenderness.   Genitourinary: Penis normal.   Genitourinary Comments: Adhikari in place   Musculoskeletal:        Right foot: There is no deformity.        Left foot: There is no deformity.   No edema   Lymphadenopathy:     He has no cervical adenopathy.     He has no axillary adenopathy.   Neurological: He appears lethargic. GCS eye subscore is 2. GCS verbal subscore is 1. GCS motor subscore is 5.   Skin: Skin is  warm, dry and intact. Capillary refill takes less than 2 seconds. No rash noted. No cyanosis.        Pale       Vents:  Vent Mode: Spont (03/09/20 0741)  Ventilator Initiated: Yes (03/08/20 1037)  Set Rate: 0 BPM (03/09/20 0741)  Vt Set: 0 mL (03/09/20 0741)  Pressure Support: 8 cmH20 (03/09/20 0741)  PEEP/CPAP: 5 cmH20 (03/09/20 0741)  Oxygen Concentration (%): 32 (03/09/20 0750)  Peak Airway Pressure: 13 cmH2O (03/09/20 0741)  Plateau Pressure: 0 cmH20 (03/09/20 0741)  Total Ve: 11.2 mL (03/09/20 0741)  F/VT Ratio<105 (RSBI): (!) 12.62 (03/09/20 0741)    Lines/Drains/Airways     Central Venous Catheter Line            Percutaneous Central Line Insertion/Assessment - Triple Lumen  03/08/20 left femoral vein 1 day          Drain                 NG/OG Tube 03/08/20 1 day         Sheath 03/08/20 Right 1 day         Urethral Catheter 03/08/20 1 day          Peripheral Intravenous Line                 Peripheral IV - Single Lumen 03/07/20 1308 18 G Left Antecubital 1 day         Peripheral IV - Single Lumen 03/07/20 1350 20 G Right Forearm 1 day         Peripheral IV - Single Lumen 03/08/20 0918 18 G Left Wrist less than 1 day                Significant Labs:    CBC/Anemia Profile:  Recent Labs   Lab 03/08/20  0746 03/08/20  1128  03/08/20 2004 03/09/20  0007 03/09/20  0401   WBC 11.25 13.74*  --   --   --  10.08   HGB 6.6* 7.3*   < > 10.4* 9.9* 9.7*  9.7*   HCT 21.1* 21.7*   < > 29.2* 28.0* 27.5*  27.5*    197  --   --   --  138*   MCV 92 91  --   --   --  92   RDW 16.7* 16.4*  --   --   --  17.7*    < > = values in this interval not displayed.        Chemistries:  Recent Labs   Lab 03/07/20  1251 03/08/20  0413 03/08/20  1258 03/09/20  0401   * 147* 144 146*   K 4.4 4.1 3.4* 3.2*   * 114* 115* 116*   CO2 18* 20* 20* 21*   BUN 59* 62* 50* 53*   CREATININE 1.3 1.1 1.0 0.8   CALCIUM 8.0* 7.7* 6.6* 6.9*   ALBUMIN 2.4*  --   --  1.8*   PROT 5.2*  --   --  3.9*   BILITOT 0.2  --   --  0.4   ALKPHOS 81   --   --  49*   ALT 15  --   --  5*   AST 16  --   --  18   MG  --   --  1.8 1.8       ABGs:   Recent Labs   Lab 03/09/20  0446   PH 7.419   PCO2 35.0   HCO3 22.7*   POCSATURATED 100   BE -2     Coagulation:   Recent Labs   Lab 03/09/20  0401   INR 1.1     Lactic Acid:   Recent Labs   Lab 03/07/20  1902 03/08/20  1258 03/09/20  0401   LACTATE 2.2 2.7* 1.0     Urine Studies:   Recent Labs   Lab 03/08/20  1529   COLORU Yellow   APPEARANCEUA Clear   PHUR 5.0   SPECGRAV 1.020   PROTEINUA Negative   GLUCUA Negative   KETONESU Negative   BILIRUBINUA Negative   OCCULTUA Negative   NITRITE Negative   UROBILINOGEN Negative   LEUKOCYTESUR Negative     All pertinent labs within the past 24 hours have been reviewed.    Significant Imaging:  CXR: I have reviewed all pertinent results/findings within the past 24 hours and my personal findings are:  Unchanged appearance of the chest compared to 03/08/2020.      ABG  Recent Labs   Lab 03/09/20  0446   PH 7.419   PO2 163*   PCO2 35.0   HCO3 22.7*   BE -2     Assessment/Plan:     Neuro  Other seizures  Cont Keppra IVPB    Parkinson disease  Cont Sinemet    Psychiatric  ELDA (generalized anxiety disorder)  Holding meds at this time d/t post extubation lethargy.    Pulmonary  On mechanically assisted ventilation  SBT/SAT passed this AM. Extubated to nasal cannula, tolerating.  Lethargic but protecting away.  No distress  ICU pulmonary monitoring another 24 hours    Oncology  Acute blood loss anemia  Patient has received 11 PRBCs this admission, FFP, Plts, and Cryo.  H&H is stable this AM with no active signs of bleeding.   H&H Q 6 hours  Monitor closely for bleeding and follow coags.      GI  * Acute upper GI bleed of gastric artery  S/P coiling of gastric artery per IR post fail attempt at Epi inj via EGD  H/H Q 6 hours  Monitor for bleeding  PPI infusion & IVFs  No transfusion indicated this AM  Family does not wish to pursue surgical intervention if re bleeding occurs.      Fecal  impaction of colon  Mg Citrate per OG once more stable    Other  Shock circulatory  Continue IVFs. Transfusion not indicated at this time.   Levophed weaned off  ICU hemodynamic monitoring      Preventive Measures and Monitoring:   Stress Ulcer: Pepcid  Nutrition: NPO  Glucose control: stable  Bowel prophylaxis: Mg Citrate once stable  DVT prophylaxis: SCDs  Hx CAD on B-Blocker: no hx CAD  Head of Bed/Reposition: Elevate HOB and turn Q1-2 hours   Early Mobility: bed rest  SAT/SBT: passed and extubated  Vent Day: #2 remove today   OG Day: #2 but remove today  Central Line Left Femoral Day: #2  Right Femoral Arterial Sheath Day: #2  Adhikari Day: #2  IVAB Day: #3  Code Status: DNR  Pneumonia Vaccine: assume UTD at NH  Flu Vaccine: assume UTD at NH     Counseling/Consultation:I have discussed the care of this patient in detail with the bedside nursing staff and Dr. Schmidt and Dr. Cano     Critical Care Time: 52 minutes  Critical secondary to Patient has a condition that poses threat to life and bodily function: Shock, massive GI bleed and [facility]  Patient is currently on drug therapy requiring intensive monitoring for toxicity: Levophed infusion  Patient is currently receiving parenteral controlled substances: Propofol infusion.      Critical care was time spent personally by me on the following activities: development of treatment plan with patient or surrogate and bedside caregivers, discussions with consultants, evaluation of patient's response to treatment, examination of patient, ordering and performing treatments and interventions, ordering and review of laboratory studies, ordering and review of radiographic studies, pulse oximetry, re-evaluation of patient's condition. This critical care time did not overlap with that of any other provider or involve time for any procedures.     Mickey Gonzalez NP  Critical Care Medicine  [facility]

## 2020-03-09 NOTE — PROGRESS NOTES
Patient extubated per Dawood Gonzalez NP at 7:50 am w/o Parameters.   Patient placed on Nasal cannula and no resp distress noted at this time.  Ventilator discontinued , Will cont to monitor patient.

## 2020-03-09 NOTE — ASSESSMENT & PLAN NOTE
SBT/SAT passed this AM. Extubated to nasal cannula, tolerating.  Lethargic but protecting away.  No distress  ICU pulmonary monitoring another 24 hours

## 2020-03-09 NOTE — PLAN OF CARE
Patient returned to the [facility] in [place]     03/09/20 0767   Final Note   Assessment Type Final Discharge Note   Anticipated Discharge Disposition Home

## 2020-03-09 NOTE — ASSESSMENT & PLAN NOTE
3/8:  Currently intubated  Was intubated during egd  Of note patient is DNR  Will plan wean vent as tolerated  Plan extubated soon as possible    3/9:  Extubated this morning  Currently tolerating extubation  Will continue to monitor in ICU

## 2020-03-09 NOTE — PLAN OF CARE
"   03/09/20 1033   Discharge Assessment   Assessment Type Discharge Planning Assessment   Confirmed/corrected address and phone number on facesheet? Yes   Assessment information obtained from? Caregiver   Prior to hospitilization cognitive status: Not Oriented to Person;Not Oriented to Place;Not Oriented to Time   Prior to hospitalization functional status: Completely Dependent   Current cognitive status: Not Oriented to Person;Not Oriented to Place;Not Oriented to Time   Current Functional Status: Completely Dependent   Facility Arrived From: [facility] [place]   Lives With facility resident   Able to Return to Prior Arrangements yes   Is patient able to care for self after discharge? No   Who are your caregiver(s) and their phone number(s)? MARSHALL Jones [phone]   Patient's perception of discharge disposition nursing home   Readmission Within the Last 30 Days no previous admission in last 30 days   Patient currently being followed by outpatient case management? No   Patient currently receives any other outside agency services? Yes   How many hours a day does the patient receive services? 24   Name and contact number of agency or person providing outside services [facility] [phone]   Is it the patient/care giver preference to resume care with the current outside agency? No   Equipment Currently Used at Home wheelchair   Do you have any problems affording any of your prescribed medications? No   Is the patient taking medications as prescribed? yes   Does the patient have transportation home? Yes   Transportation Anticipated agency   Does the patient receive services at the [facility]? No   Discharge Plan A Return to nursing home   DME Needed Upon Discharge  none   Patient/Family in Agreement with Plan yes     Spoke with pt's RN at his NH (Karen) as pt is non-verbal. Pt lives at [facility] in [place], [place]. Pt is WC bound and is "total care." He can return to his " residence on DC. CM DC needs pending hospital course but he is unlikely to require further DC intervention. SWer provided a transitional care folder, information on advanced directives, information on pharmacy bedside delivery, and discharge planning begins on admission with contact information for any needs/questions. Pt will not require bedside medication delivery.     Jase Coleman LMSW 3/9/2020 10:39 AM

## 2020-03-09 NOTE — PLAN OF CARE
Pt was given multiple blood products. BP supported by levophed. Pain/restlessness under control with sedation. POC discussed with pt and family (POA) at bedside. Lab work done q4h. Pt turned q2h. OG tube and ET suctioning produce output of bright red blood. Dawood Gonzalez NP aware. UO remained adequate. Pt put in restraints for protection against pulling medical lines. Will continue to monitor progression.

## 2020-03-09 NOTE — SUBJECTIVE & OBJECTIVE
Subjective:     Interval History:   The patient is s/p EGD which was unsuccessful at controlling bleeding. He had IR embolization which was successful. Hgb stable at 9.9. Patient just extubated a few minutes ago. Off Levophed since yesterday evening. Currently with melena.     Review of Systems   Constitutional:        See Interval History for daily ROS.      Objective:     Vital Signs (Most Recent):  Temp: 97.4 °F (36.3 °C) (03/09/20 0305)  Pulse: 70 (03/09/20 0750)  Resp: 12 (03/09/20 0750)  BP: (!) 76/46 (03/09/20 0605)  SpO2: 100 % (03/09/20 0750) Vital Signs (24h Range):  Temp:  [96.8 °F (36 °C)-98.4 °F (36.9 °C)] 97.4 °F (36.3 °C)  Pulse:  [64-94] 70  Resp:  [12-21] 12  SpO2:  [97 %-100 %] 100 %  BP: ()/() 76/46     Weight: 64.9 kg (143 lb 1.3 oz) (03/09/20 0505)  Body mass index is 20.53 kg/m².      Intake/Output Summary (Last 24 hours) at 3/9/2020 0824  Last data filed at 3/9/2020 0629  Gross per 24 hour   Intake 7096 ml   Output 3549 ml   Net 3547 ml       Lines/Drains/Airways     Central Venous Catheter Line            Percutaneous Central Line Insertion/Assessment - Triple Lumen  03/08/20 left femoral vein 1 day          Drain                 NG/OG Tube 03/08/20 1 day         Sheath 03/08/20 Right 1 day         Urethral Catheter 03/08/20 1 day          Peripheral Intravenous Line                 Peripheral IV - Single Lumen 03/07/20 1308 18 G Left Antecubital 1 day         Peripheral IV - Single Lumen 03/07/20 1350 20 G Right Forearm 1 day         Peripheral IV - Single Lumen 03/08/20 0918 18 G Left Wrist less than 1 day                Physical Exam   Constitutional: He appears well-developed. No distress.   HENT:   Head: Normocephalic and atraumatic.   Cardiovascular: Normal rate and regular rhythm.   Pulmonary/Chest: Effort normal and breath sounds normal. No respiratory distress. He has no wheezes.   Abdominal: Soft. Bowel sounds are normal. He exhibits no distension. There is no  tenderness.   Neurological:   Just extubated. Doesn't open eyes or follow commands.    Skin: He is not diaphoretic.       Significant Labs:  CBC:   Recent Labs   Lab 03/08/20  0746 03/08/20  1128  03/08/20 2004 03/09/20  0007 03/09/20  0401   WBC 11.25 13.74*  --   --   --  10.08   HGB 6.6* 7.3*   < > 10.4* 9.9* 9.7*  9.7*   HCT 21.1* 21.7*   < > 29.2* 28.0* 27.5*  27.5*    197  --   --   --  138*    < > = values in this interval not displayed.     CMP:   Recent Labs   Lab 03/09/20  0401   GLU 95   CALCIUM 6.9*   ALBUMIN 1.8*   PROT 3.9*   *   K 3.2*   CO2 21*   *   BUN 53*   CREATININE 0.8   ALKPHOS 49*   ALT 5*   AST 18   BILITOT 0.4     Coagulation:   Recent Labs   Lab 03/09/20 0401   INR 1.1         Significant Imaging:  Imaging results within the past 24 hours have been reviewed.

## 2020-03-10 PROBLEM — E87.6 HYPOKALEMIA: Status: ACTIVE | Noted: 2020-03-10

## 2020-03-10 PROBLEM — G93.40 ACUTE ENCEPHALOPATHY: Status: ACTIVE | Noted: 2020-03-10

## 2020-03-10 PROBLEM — K92.2 ACUTE LOWER GI BLEEDING: Status: ACTIVE | Noted: 2020-03-10

## 2020-03-10 PROBLEM — R57.9 SHOCK CIRCULATORY: Status: RESOLVED | Noted: 2020-03-08 | Resolved: 2020-03-10

## 2020-03-10 PROBLEM — D72.829 LEUKOCYTOSIS: Status: RESOLVED | Noted: 2020-03-07 | Resolved: 2020-03-10

## 2020-03-10 PROBLEM — Z99.11 ON MECHANICALLY ASSISTED VENTILATION: Status: RESOLVED | Noted: 2020-03-08 | Resolved: 2020-03-10

## 2020-03-10 PROBLEM — E87.20 LACTIC ACIDOSIS: Status: RESOLVED | Noted: 2020-03-04 | Resolved: 2020-03-10

## 2020-03-10 PROBLEM — E87.6 HYPOKALEMIA: Status: RESOLVED | Noted: 2020-03-10 | Resolved: 2020-03-10

## 2020-03-10 LAB
ALBUMIN SERPL BCP-MCNC: 1.7 G/DL (ref 3.5–5.2)
ALP SERPL-CCNC: 60 U/L (ref 55–135)
ALT SERPL W/O P-5'-P-CCNC: 12 U/L (ref 10–44)
ANION GAP SERPL CALC-SCNC: 2 MMOL/L (ref 8–16)
AST SERPL-CCNC: 20 U/L (ref 10–40)
BASOPHILS # BLD AUTO: 0.03 K/UL (ref 0–0.2)
BASOPHILS NFR BLD: 0.4 % (ref 0–1.9)
BILIRUB SERPL-MCNC: 0.4 MG/DL (ref 0.1–1)
BUN SERPL-MCNC: 33 MG/DL (ref 8–23)
CALCIUM SERPL-MCNC: 7 MG/DL (ref 8.7–10.5)
CHLORIDE SERPL-SCNC: 119 MMOL/L (ref 95–110)
CO2 SERPL-SCNC: 24 MMOL/L (ref 23–29)
CREAT SERPL-MCNC: 0.8 MG/DL (ref 0.5–1.4)
DIFFERENTIAL METHOD: ABNORMAL
EOSINOPHIL # BLD AUTO: 0.4 K/UL (ref 0–0.5)
EOSINOPHIL NFR BLD: 4.8 % (ref 0–8)
ERYTHROCYTE [DISTWIDTH] IN BLOOD BY AUTOMATED COUNT: 18.9 % (ref 11.5–14.5)
EST. GFR  (AFRICAN AMERICAN): >60 ML/MIN/1.73 M^2
EST. GFR  (NON AFRICAN AMERICAN): >60 ML/MIN/1.73 M^2
GLUCOSE SERPL-MCNC: 81 MG/DL (ref 70–110)
HCT VFR BLD AUTO: 27.4 % (ref 40–54)
HCT VFR BLD AUTO: 27.4 % (ref 40–54)
HCT VFR BLD AUTO: 31.5 % (ref 40–54)
HGB BLD-MCNC: 10.2 G/DL (ref 14–18)
HGB BLD-MCNC: 9.1 G/DL (ref 14–18)
HGB BLD-MCNC: 9.1 G/DL (ref 14–18)
IMM GRANULOCYTES # BLD AUTO: 0.02 K/UL (ref 0–0.04)
IMM GRANULOCYTES NFR BLD AUTO: 0.2 % (ref 0–0.5)
LYMPHOCYTES # BLD AUTO: 0.8 K/UL (ref 1–4.8)
LYMPHOCYTES NFR BLD: 9.8 % (ref 18–48)
MAGNESIUM SERPL-MCNC: 2 MG/DL (ref 1.6–2.6)
MCH RBC QN AUTO: 31.5 PG (ref 27–31)
MCHC RBC AUTO-ENTMCNC: 33.2 G/DL (ref 32–36)
MCV RBC AUTO: 95 FL (ref 82–98)
MONOCYTES # BLD AUTO: 1.4 K/UL (ref 0.3–1)
MONOCYTES NFR BLD: 16.9 % (ref 4–15)
NEUTROPHILS # BLD AUTO: 5.7 K/UL (ref 1.8–7.7)
NEUTROPHILS NFR BLD: 67.9 % (ref 38–73)
NRBC BLD-RTO: 0 /100 WBC
PLATELET # BLD AUTO: 125 K/UL (ref 150–350)
PMV BLD AUTO: 10.8 FL (ref 9.2–12.9)
POTASSIUM SERPL-SCNC: 3.5 MMOL/L (ref 3.5–5.1)
PROT SERPL-MCNC: 4 G/DL (ref 6–8.4)
RBC # BLD AUTO: 2.89 M/UL (ref 4.6–6.2)
SODIUM SERPL-SCNC: 145 MMOL/L (ref 136–145)
TSH SERPL DL<=0.005 MIU/L-ACNC: 1.91 UIU/ML (ref 0.4–4)
WBC # BLD AUTO: 8.38 K/UL (ref 3.9–12.7)

## 2020-03-10 PROCEDURE — 85025 COMPLETE CBC W/AUTO DIFF WBC: CPT

## 2020-03-10 PROCEDURE — 85018 HEMOGLOBIN: CPT

## 2020-03-10 PROCEDURE — 63600175 PHARM REV CODE 636 W HCPCS: Performed by: NURSE PRACTITIONER

## 2020-03-10 PROCEDURE — 85014 HEMATOCRIT: CPT

## 2020-03-10 PROCEDURE — C9113 INJ PANTOPRAZOLE SODIUM, VIA: HCPCS | Performed by: NURSE PRACTITIONER

## 2020-03-10 PROCEDURE — 82746 ASSAY OF FOLIC ACID SERUM: CPT

## 2020-03-10 PROCEDURE — 83735 ASSAY OF MAGNESIUM: CPT

## 2020-03-10 PROCEDURE — 84443 ASSAY THYROID STIM HORMONE: CPT

## 2020-03-10 PROCEDURE — 99900035 HC TECH TIME PER 15 MIN (STAT)

## 2020-03-10 PROCEDURE — 36415 COLL VENOUS BLD VENIPUNCTURE: CPT

## 2020-03-10 PROCEDURE — 80053 COMPREHEN METABOLIC PANEL: CPT

## 2020-03-10 PROCEDURE — 99291 CRITICAL CARE FIRST HOUR: CPT | Mod: ,,, | Performed by: NURSE PRACTITIONER

## 2020-03-10 PROCEDURE — 20000000 HC ICU ROOM

## 2020-03-10 PROCEDURE — 99232 SBSQ HOSP IP/OBS MODERATE 35: CPT | Mod: ,,, | Performed by: PHYSICIAN ASSISTANT

## 2020-03-10 PROCEDURE — 63600175 PHARM REV CODE 636 W HCPCS: Performed by: INTERNAL MEDICINE

## 2020-03-10 PROCEDURE — 25000003 PHARM REV CODE 250: Performed by: NURSE PRACTITIONER

## 2020-03-10 PROCEDURE — 27000221 HC OXYGEN, UP TO 24 HOURS

## 2020-03-10 PROCEDURE — 99291 PR CRITICAL CARE, E/M 30-74 MINUTES: ICD-10-PCS | Mod: ,,, | Performed by: NURSE PRACTITIONER

## 2020-03-10 PROCEDURE — 82607 VITAMIN B-12: CPT

## 2020-03-10 PROCEDURE — 99232 PR SUBSEQUENT HOSPITAL CARE,LEVL II: ICD-10-PCS | Mod: ,,, | Performed by: PHYSICIAN ASSISTANT

## 2020-03-10 RX ORDER — PANTOPRAZOLE SODIUM 40 MG/10ML
40 INJECTION, POWDER, LYOPHILIZED, FOR SOLUTION INTRAVENOUS 2 TIMES DAILY
Status: DISCONTINUED | OUTPATIENT
Start: 2020-03-10 | End: 2020-03-11 | Stop reason: HOSPADM

## 2020-03-10 RX ADMIN — PANTOPRAZOLE SODIUM 40 MG: 40 INJECTION, POWDER, LYOPHILIZED, FOR SOLUTION INTRAVENOUS at 08:03

## 2020-03-10 RX ADMIN — LEVETIRACETAM 500 MG: 5 INJECTION INTRAVENOUS at 08:03

## 2020-03-10 RX ADMIN — SODIUM CHLORIDE: 0.9 INJECTION, SOLUTION INTRAVENOUS at 11:03

## 2020-03-10 RX ADMIN — CARBIDOPA AND LEVODOPA 1 TABLET: 25; 100 TABLET ORAL at 02:03

## 2020-03-10 RX ADMIN — DEXTROSE 8 MG/HR: 50 INJECTION, SOLUTION INTRAVENOUS at 02:03

## 2020-03-10 RX ADMIN — CARBIDOPA AND LEVODOPA 1 TABLET: 25; 100 TABLET ORAL at 08:03

## 2020-03-10 RX ADMIN — SODIUM CHLORIDE: 0.9 INJECTION, SOLUTION INTRAVENOUS at 01:03

## 2020-03-10 RX ADMIN — POTASSIUM CHLORIDE 40 MEQ: 29.8 INJECTION, SOLUTION INTRAVENOUS at 05:03

## 2020-03-10 NOTE — ASSESSMENT & PLAN NOTE
S/P coiling of gastric artery 3/8 per IR post failed attempt at Epi inj via EGD  H/H Q 12 hours  Monitor for bleeding  PPI BID  No transfusion indicated this AM  Family does not wish to pursue surgical intervention if re bleeding occurs.

## 2020-03-10 NOTE — PROGRESS NOTES
"[facility] Hospital Medicine  Progress Note    Patient Name: Adebayo Oneil  MRN: [id number]  Patient Class: IP- Inpatient   Admission Date: 3/7/2020  Length of Stay: 2 days  Attending Physician: Joao Cano MD  Primary Care Provider: Provider Notinsystem        Subjective:     Principal Problem:Acute upper GI bleed        HPI:  Adebayo Oneil is a 69 y.o. 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 Pt was unresponsive at 9:30 this morning and [facility] was called. Upon AMS arrival pt was responsive but mumbled his speech and was "more confused" than baseline. Pt was discharged yesterday with hytrin. [facility] states that pt's blood pressure was 75/43 upon arrival and 80/35 en route. Associated sxs include confusion.   In the ED, pt was initial hypotensive, WBCs 23.34,  hemoglobin of 6.6, creatinine of 1.3 and BUN of 59, albumin of 2.4 and lactic acid of 3.7. CT head negative for acute findings. CT abdomen showed prominent elevation of the right diaphragm with compressive atelectasis right lung base. Aneurysmal dilatation of the aortic root measuring 4 cm. Constipation with little high-grade rectal fecal impaction. Abdominal aortic ectasia with maximum luminal diameter 3.1 cm. His code status is noted to be DNR and Danni Howell is his power of .   He will be kept on OBS for lactic acidosis under the care of Hospital Medicine    Overview/Hospital Course:  3/8:  Patient underwent EGD by GI this a.m. bleeding ulcers noted.  She acute bleeding noted during procedure which required intervention " radiology consult.  Angiography was performed and bleeding was stopped with coils and particles.  Patient was intubated during procedure.  Rapid transfusion protocol was initiated due to severity of blood loss.  Patient was transferred to ICU.    3/9:  H&H is stable today.  Patient was extubated this morning.  Will need further monitoring in ICU.  Patient was lethargic and not really responding this morning will need to clarify with power of  all mental status at baseline.    3/10:  H&H remained stable.  Patient was stepped down from ICU today.  Currently still not responsive.  Will initiate workup altered mental status.  Possible brain MRI tomorrow.  Patient is likely hospice candidate at the 's home if his mentation improves.  If patient's mental status does not improve we will pursue inpatient hospice.     Interval History:  No acute events reported overnight.  Plan to step-down from ICU today.  Patient possible candidate for hospice.    Review of Systems   Unable to perform ROS: Mental status change     Objective:     Vital Signs (Most Recent):  Temp: 98.4 °F (36.9 °C) (03/10/20 1105)  Pulse: (!) 57 (03/10/20 1105)  Resp: 14 (03/10/20 1105)  BP: 125/60 (03/10/20 1105)  SpO2: 100 % (03/10/20 1105) Vital Signs (24h Range):  Temp:  [97.2 °F (36.2 °C)-98.5 °F (36.9 °C)] 98.4 °F (36.9 °C)  Pulse:  [57-78] 57  Resp:  [8-18] 14  SpO2:  [95 %-100 %] 100 %  BP: ()/(38-98) 125/60     Weight: 64.9 kg (143 lb 1.3 oz)  Body mass index is 20.53 kg/m².    Intake/Output Summary (Last 24 hours) at 3/10/2020 1143  Last data filed at 3/10/2020 1127  Gross per 24 hour   Intake 3505.83 ml   Output 1270 ml   Net 2235.83 ml      Physical Exam   Constitutional: No distress.   Chronically ill appearing    HENT:   Head: Normocephalic and atraumatic.   Cardiovascular: Normal rate, regular rhythm and normal heart sounds. Exam reveals no gallop and no friction rub.   No murmur heard.  Pulmonary/Chest: Effort normal and  breath sounds normal. No stridor. No respiratory distress. He has no wheezes. He has no rales.   Abdominal: Soft. Bowel sounds are normal. He exhibits no distension and no mass. There is no tenderness. There is no guarding.   Musculoskeletal: He exhibits no edema.   Neurological:   Currently not responsive, not on sedation   Skin: He is not diaphoretic.       Significant Labs:   Recent Lab Results       03/10/20  0411   03/09/20  2334   03/09/20  1800   03/09/20  1146        Albumin 1.7           Alkaline Phosphatase 60           ALT 12           Anion Gap 2           AST 20           Baso # 0.03           Basophil% 0.4           BILIRUBIN TOTAL 0.4  Comment:  For infants and newborns, interpretation of results should be based  on gestational age, weight and in agreement with clinical  observations.  Premature Infant recommended reference ranges:  Up to 24 hours.............<8.0 mg/dL  Up to 48 hours............<12.0 mg/dL  3-5 days..................<15.0 mg/dL  6-29 days.................<15.0 mg/dL             BUN, Bld 33           Calcium 7.0           Calcium, Ion       1.12     Chloride 119           CO2 24           Creatinine 0.8           Differential Method Automated           eGFR if  >60           eGFR if non  >60  Comment:  Calculation used to obtain the estimated glomerular filtration  rate (eGFR) is the CKD-EPI equation.              Eos # 0.4           Eosinophil% 4.8           Glucose 81           Gran # (ANC) 5.7           Gran% 67.9           Hematocrit 27.4 27.1 29.1 28.0      27.4           Hemoglobin 9.1 9.3 10.0 9.7      9.1           Immature Grans (Abs) 0.02  Comment:  Mild elevation in immature granulocytes is non specific and   can be seen in a variety of conditions including stress response,   acute inflammation, trauma and pregnancy. Correlation with other   laboratory and clinical findings is essential.             Immature Granulocytes 0.2           Lymph  # 0.8           Lymph% 9.8           Magnesium 2.0           MCH 31.5           MCHC 33.2           MCV 95           Mono # 1.4           Mono% 16.9           MPV 10.8           nRBC 0           Platelets 125           Potassium 3.5           PROTEIN TOTAL 4.0           RBC 2.89           RDW 18.9           Sodium 145           WBC 8.38               All pertinent labs within the past 24 hours have been reviewed.    Significant Imaging: I have reviewed all pertinent imaging results/findings within the past 24 hours.      Assessment/Plan:      * Acute upper GI bleed of gastric artery  3/8:  Continue to transfuse packed RBCs as needed  Goal to keep hemoglobin above 8  Continue to monitor H&H every 4-6 hours  IR was successful in cauterizing and bleed of short gastric arteries  GI successful in cauterizing and gastric ulcer bleed  Continue Protonix drip    3/9:  Hemoglobin stable this morning  Will continue to monitor hemoglobin every 6-8 hours  Transfuse as needed   continue Protonix drip    3/10:  H&H remained stable this morning  Will continue monitor  Continue Protonix bid  Transfuse as needed    Acute encephalopathy  3/10:  Acute encephalopathy to be related to progression of chronic disease  Patient does have Parkinson's  Will check B12 TSH and folate  Possible brain mri  Possible neuro evaluation  Plan to reassess in am       Essential hypertension  Bp stable   Hold Hytrin   Monitor       Hyperlipidemia, mixed  Continue Statin       Other seizures  Continue Keppra.  Seizure precautions     3/9:  Continue Keppra  Continue to monitor        Parkinson disease  3/9:  Home medication restarted    3/10:  Home medication resumed however patient unable to swallow  Current mental status could be due progression of disease  Will initiate workup for altered mental status including TSH, B12, folate  Will consider possible brain MRI  And even neurological consult if necessary  Patient possibly a hospice candidate at the  's home  However if mentation is not improved we will consider inpatient hospice    Acute blood loss anemia  Transfuse 2 units PRBCs.   -Monitor hemoglobin and hematocrit.         VTE Risk Mitigation (From admission, onward)         Ordered     Place sequential compression device  Until discontinued      03/08/20 1249     IP VTE HIGH RISK PATIENT  Once      03/08/20 1249                Critical care time spent on the evaluation and treatment of severe organ dysfunction, review of pertinent labs and imaging studies, discussions with consulting providers and discussions with patient/family: 40 minutes.      Joao Cano MD  Department of Hospital Medicine   [facility]

## 2020-03-10 NOTE — PLAN OF CARE
Pt awake with drowsy noted at times. Tolerating po intake. Pureed diet ordered per NP Carlos. Repositioned Q2 hr with wedge. VSS. Cholo heels elevated with cholo heel boots. Call light in reach. POA updated at bedside. Will continue to monitor.

## 2020-03-10 NOTE — ASSESSMENT & PLAN NOTE
3/10:  Acute encephalopathy to be related to progression of chronic disease  Patient does have Parkinson's  Will check B12 TSH and folate  Possible brain mri  Possible neuro evaluation  Plan to reassess in am

## 2020-03-10 NOTE — PROGRESS NOTES
[facility]  Gastroenterology  Progress Note    Patient Name: dAebayo Oneil  MRN: [id number]  Admission Date: 3/7/2020  Hospital Length of Stay: 2 days  Code Status: DNR   Attending Provider: Joao Cano MD  Consulting Provider: Elie Cooley PA-C  Primary Care Physician: Provider Not[employer]ystem  Principal Problem: Acute upper GI bleed      Subjective:     Interval History:   No acute events overnight. Hgb stable. BP low at times, but seems positional per nursing staff. He remains off pressors. Volume of black stools decreasing.     Review of Systems   Constitutional:        See Interval History for daily ROS.      Objective:     Vital Signs (Most Recent):  Temp: 98.5 °F (36.9 °C) (03/10/20 0305)  Pulse: 67 (03/10/20 0719)  Resp: 12 (03/10/20 0719)  BP: (!) 85/65 (03/10/20 0630)  SpO2: 100 % (03/10/20 0719) Vital Signs (24h Range):  Temp:  [97.2 °F (36.2 °C)-98.5 °F (36.9 °C)] 98.5 °F (36.9 °C)  Pulse:  [57-78] 67  Resp:  [8-18] 12  SpO2:  [95 %-100 %] 100 %  BP: ()/(38-98) 85/65     Weight: 64.9 kg (143 lb 1.3 oz) (03/09/20 0505)  Body mass index is 20.53 kg/m².      Intake/Output Summary (Last 24 hours) at 3/10/2020 0827  Last data filed at 3/10/2020 0705  Gross per 24 hour   Intake 3060 ml   Output 1570 ml   Net 1490 ml       Lines/Drains/Airways     Central Venous Catheter Line            Percutaneous Central Line Insertion/Assessment - Triple Lumen  03/08/20 left femoral vein 2 days          Peripheral Intravenous Line                 Peripheral IV - Single Lumen 03/07/20 1350 20 G Right Forearm 2 days         Peripheral IV - Single Lumen 03/08/20 0918 18 G Left Wrist 1 day                Physical Exam   Constitutional: He appears well-developed. No distress.   HENT:   Head: Normocephalic and atraumatic.   Eyes: EOM are normal.   Cardiovascular: Normal rate and regular rhythm.   Pulmonary/Chest: Effort normal and breath sounds normal. No respiratory distress. He has no wheezes.   Abdominal:  Soft. Bowel sounds are normal. He exhibits no distension. There is no tenderness.   Neurological:   Opens eyes today, but doesn't speak.    Skin: He is not diaphoretic.       Significant Labs:  CBC:   Recent Labs   Lab 03/08/20  1128  03/09/20  0401  03/09/20  1800 03/09/20  2334 03/10/20  0411   WBC 13.74*  --  10.08  --   --   --  8.38   HGB 7.3*   < > 9.7*  9.7*   < > 10.0* 9.3* 9.1*  9.1*   HCT 21.7*   < > 27.5*  27.5*   < > 29.1* 27.1* 27.4*  27.4*     --  138*  --   --   --  125*    < > = values in this interval not displayed.     CMP:   Recent Labs   Lab 03/10/20  0411   GLU 81   CALCIUM 7.0*   ALBUMIN 1.7*   PROT 4.0*      K 3.5   CO2 24   *   BUN 33*   CREATININE 0.8   ALKPHOS 60   ALT 12   AST 20   BILITOT 0.4         Significant Imaging:  Imaging results within the past 24 hours have been reviewed.    Assessment/Plan:     * Acute upper GI bleed of gastric artery  S/p EGD and IR embolization with stabilization of clinical status.   Continue Protonix IV.   Continue to monitor H/H.     Acute blood loss anemia  Continue to monitor and transfuse additional units as needed.   No active bleeding suspected.         Thank you for your consult. We will follow peripherally.     Elie Cooley PA-C  Gastroenterology  [facility]

## 2020-03-10 NOTE — NURSING
Pt awake and alert requesting food. Oriented to self. NP Carlos called to bedside to assess change in mentation. Danni BORJAS, updated via phone. Bedside swallow passed. Pureed diet ordered. To be feed by staff.

## 2020-03-10 NOTE — ASSESSMENT & PLAN NOTE
Patient has received 11 PRBCs this admission, FFP, Plts, and Cryo.  H/H stable and no further active bleeding  H&H Q 12 hours  Monitor closely for bleeding and follow coags.

## 2020-03-10 NOTE — PLAN OF CARE
Patient currently resting quietly in bed. VS currently stable. Patient sinus bradycardic on monitor at this time. Patient currently receiving oxygen via nasal cannula. Patient has no signs of shortness of breathe at this time. Patient turned in bed every 2 hours to avoid further skin breakdown to back side and prevent new wound development. Patient turns with 2 assist and is positioned with pillows. No sign of new wound development or further skin breakdown noted. Patient currently nonverbal at this time and unable to follow simple commands. Patient on normal saline and protonix drip at this time. Plan of care updated with patient.. There are no further questions after updated on plan of care at this time. No distress noted. Will continue to monitor.

## 2020-03-10 NOTE — ASSESSMENT & PLAN NOTE
POA states patient sometimes has episodes where does not respond for 1-3 days  Possible anoxic etiology  Cont neuro monitoring  Cont supportive care  POA request no invasive support

## 2020-03-10 NOTE — SUBJECTIVE & OBJECTIVE
Interval History:  No acute events reported overnight.  Plan to step-down from ICU today.  Patient possible candidate for hospice.    Review of Systems   Unable to perform ROS: Mental status change     Objective:     Vital Signs (Most Recent):  Temp: 98.4 °F (36.9 °C) (03/10/20 1105)  Pulse: (!) 57 (03/10/20 1105)  Resp: 14 (03/10/20 1105)  BP: 125/60 (03/10/20 1105)  SpO2: 100 % (03/10/20 1105) Vital Signs (24h Range):  Temp:  [97.2 °F (36.2 °C)-98.5 °F (36.9 °C)] 98.4 °F (36.9 °C)  Pulse:  [57-78] 57  Resp:  [8-18] 14  SpO2:  [95 %-100 %] 100 %  BP: ()/(38-98) 125/60     Weight: 64.9 kg (143 lb 1.3 oz)  Body mass index is 20.53 kg/m².    Intake/Output Summary (Last 24 hours) at 3/10/2020 1143  Last data filed at 3/10/2020 1127  Gross per 24 hour   Intake 3505.83 ml   Output 1270 ml   Net 2235.83 ml      Physical Exam   Constitutional: No distress.   Chronically ill appearing    HENT:   Head: Normocephalic and atraumatic.   Cardiovascular: Normal rate, regular rhythm and normal heart sounds. Exam reveals no gallop and no friction rub.   No murmur heard.  Pulmonary/Chest: Effort normal and breath sounds normal. No stridor. No respiratory distress. He has no wheezes. He has no rales.   Abdominal: Soft. Bowel sounds are normal. He exhibits no distension and no mass. There is no tenderness. There is no guarding.   Musculoskeletal: He exhibits no edema.   Neurological:   Currently not responsive, not on sedation   Skin: He is not diaphoretic.       Significant Labs:   Recent Lab Results       03/10/20  0411   03/09/20  2334   03/09/20  1800   03/09/20  1146        Albumin 1.7           Alkaline Phosphatase 60           ALT 12           Anion Gap 2           AST 20           Baso # 0.03           Basophil% 0.4           BILIRUBIN TOTAL 0.4  Comment:  For infants and newborns, interpretation of results should be based  on gestational age, weight and in agreement with clinical  observations.  Premature Infant  recommended reference ranges:  Up to 24 hours.............<8.0 mg/dL  Up to 48 hours............<12.0 mg/dL  3-5 days..................<15.0 mg/dL  6-29 days.................<15.0 mg/dL             BUN, Bld 33           Calcium 7.0           Calcium, Ion       1.12     Chloride 119           CO2 24           Creatinine 0.8           Differential Method Automated           eGFR if  >60           eGFR if non  >60  Comment:  Calculation used to obtain the estimated glomerular filtration  rate (eGFR) is the CKD-EPI equation.              Eos # 0.4           Eosinophil% 4.8           Glucose 81           Gran # (ANC) 5.7           Gran% 67.9           Hematocrit 27.4 27.1 29.1 28.0      27.4           Hemoglobin 9.1 9.3 10.0 9.7      9.1           Immature Grans (Abs) 0.02  Comment:  Mild elevation in immature granulocytes is non specific and   can be seen in a variety of conditions including stress response,   acute inflammation, trauma and pregnancy. Correlation with other   laboratory and clinical findings is essential.             Immature Granulocytes 0.2           Lymph # 0.8           Lymph% 9.8           Magnesium 2.0           MCH 31.5           MCHC 33.2           MCV 95           Mono # 1.4           Mono% 16.9           MPV 10.8           nRBC 0           Platelets 125           Potassium 3.5           PROTEIN TOTAL 4.0           RBC 2.89           RDW 18.9           Sodium 145           WBC 8.38               All pertinent labs within the past 24 hours have been reviewed.    Significant Imaging: I have reviewed all pertinent imaging results/findings within the past 24 hours.

## 2020-03-10 NOTE — SUBJECTIVE & OBJECTIVE
Review of Systems   Unable to perform ROS: Mental status change         Objective:     Vital Signs (Most Recent):  Temp: 98.5 °F (36.9 °C) (03/10/20 0305)  Pulse: 67 (03/10/20 0719)  Resp: 12 (03/10/20 0719)  BP: (!) 85/65 (03/10/20 0630)  SpO2: 100 % (03/10/20 0719) Vital Signs (24h Range):  Temp:  [97.2 °F (36.2 °C)-98.5 °F (36.9 °C)] 98.5 °F (36.9 °C)  Pulse:  [57-78] 67  Resp:  [8-18] 12  SpO2:  [95 %-100 %] 100 %  BP: ()/(38-98) 85/65     Weight: 64.9 kg (143 lb 1.3 oz)  Body mass index is 20.53 kg/m².      Intake/Output Summary (Last 24 hours) at 3/10/2020 0737  Last data filed at 3/10/2020 0600  Gross per 24 hour   Intake 3060 ml   Output 1555 ml   Net 1505 ml       Physical Exam   Constitutional: Vital signs are normal. He appears well-developed. He appears lethargic.  Non-toxic appearance. He has a sickly appearance. He appears ill. No distress. Nasal cannula in place.   HENT:   Head: Normocephalic and atraumatic.   Mouth/Throat: Oropharynx is clear and moist and mucous membranes are normal.   Eyes: Pupils are equal, round, and reactive to light. Lids are normal.   Neck: Trachea normal. Neck supple. No JVD present. Carotid bruit is not present.   Cardiovascular: Normal rate, regular rhythm and normal heart sounds. Exam reveals no gallop and no friction rub.   No murmur heard.  Pulses:       Radial pulses are 2+ on the right side, and 2+ on the left side.        Dorsalis pedis pulses are 1+ on the right side, and 1+ on the left side.   Pulmonary/Chest: Effort normal and breath sounds normal. No accessory muscle usage. No tachypnea. No respiratory distress.   Abdominal: Soft. Normal appearance. He exhibits no distension. Bowel sounds are decreased. There is no tenderness.   Genitourinary: Penis normal.   Genitourinary Comments: Adhikari in place   Musculoskeletal:        Right wrist: He exhibits decreased range of motion.        Left wrist: He exhibits decreased range of motion.        Right foot: There is  no deformity.        Left foot: There is no deformity.   Trace tibial edema   Lymphadenopathy:     He has no cervical adenopathy.     He has no axillary adenopathy.   Neurological: He appears lethargic.   Withdraws to pain.  No purposeful movements.  Does not follow commands.     Skin: Skin is warm, dry and intact. Capillary refill takes less than 2 seconds. No rash noted. No cyanosis.            Vents:  Vent Mode: Spont (03/09/20 0741)  Ventilator Initiated: Yes (03/08/20 1037)  Set Rate: 0 BPM (03/09/20 0741)  Vt Set: 0 mL (03/09/20 0741)  Pressure Support: 8 cmH20 (03/09/20 0741)  PEEP/CPAP: 5 cmH20 (03/09/20 0741)  Oxygen Concentration (%): 32 (03/09/20 0750)  Peak Airway Pressure: 13 cmH2O (03/09/20 0741)  Plateau Pressure: 0 cmH20 (03/09/20 0741)  Total Ve: 11.2 mL (03/09/20 0741)  F/VT Ratio<105 (RSBI): (!) 12.62 (03/09/20 0741)    Lines/Drains/Airways     Central Venous Catheter Line            Percutaneous Central Line Insertion/Assessment - Triple Lumen  03/08/20 left femoral vein 2 days          Drain                 Urethral Catheter 03/08/20 2 days          Peripheral Intravenous Line                 Peripheral IV - Single Lumen 03/07/20 1350 20 G Right Forearm 2 days         Peripheral IV - Single Lumen 03/08/20 0918 18 G Left Wrist 1 day                Significant Labs:    CBC/Anemia Profile:  Recent Labs   Lab 03/08/20  1128  03/09/20  0401  03/09/20  1800 03/09/20  2334 03/10/20  0411   WBC 13.74*  --  10.08  --   --   --  8.38   HGB 7.3*   < > 9.7*  9.7*   < > 10.0* 9.3* 9.1*  9.1*   HCT 21.7*   < > 27.5*  27.5*   < > 29.1* 27.1* 27.4*  27.4*     --  138*  --   --   --  125*   MCV 91  --  92  --   --   --  95   RDW 16.4*  --  17.7*  --   --   --  18.9*    < > = values in this interval not displayed.        Chemistries:  Recent Labs   Lab 03/08/20  1258 03/09/20  0401 03/10/20  0411    146* 145   K 3.4* 3.2* 3.5   * 116* 119*   CO2 20* 21* 24   BUN 50* 53* 33*   CREATININE 1.0  0.8 0.8   CALCIUM 6.6* 6.9* 7.0*   ALBUMIN  --  1.8* 1.7*   PROT  --  3.9* 4.0*   BILITOT  --  0.4 0.4   ALKPHOS  --  49* 60   ALT  --  5* 12   AST  --  18 20   MG 1.8 1.8 2.0       All pertinent labs within the past 24 hours have been reviewed.

## 2020-03-10 NOTE — ASSESSMENT & PLAN NOTE
3/8:  Continue to transfuse packed RBCs as needed  Goal to keep hemoglobin above 8  Continue to monitor H&H every 4-6 hours  IR was successful in cauterizing and bleed of short gastric arteries  GI successful in cauterizing and gastric ulcer bleed  Continue Protonix drip    3/9:  Hemoglobin stable this morning  Will continue to monitor hemoglobin every 6-8 hours  Transfuse as needed   continue Protonix drip    3/10:  H&H remained stable this morning  Will continue monitor  Continue Protonix bid  Transfuse as needed

## 2020-03-10 NOTE — ASSESSMENT & PLAN NOTE
3/9:  Home medication restarted    3/10:  Home medication resumed however patient unable to swallow  Current mental status could be due progression of disease  Will initiate workup for altered mental status including TSH, B12, folate  Will consider possible brain MRI  And even neurological consult if necessary  Patient possibly a hospice candidate at the 's home  However if mentation is not improved we will consider inpatient hospice

## 2020-03-10 NOTE — PROGRESS NOTES
R groin art sheath pulled per orders.  Pressure held for approximatelyt 30 minutes until hemostasis achieved.  Pressure dressing applied and will be removed in 2 hours per orders.

## 2020-03-10 NOTE — SUBJECTIVE & OBJECTIVE
Subjective:     Interval History:   No acute events overnight. Hgb stable. BP low at times, but seems positional per nursing staff. He remains off pressors. Volume of black stools decreasing.     Review of Systems   Constitutional:        See Interval History for daily ROS.      Objective:     Vital Signs (Most Recent):  Temp: 98.5 °F (36.9 °C) (03/10/20 0305)  Pulse: 67 (03/10/20 0719)  Resp: 12 (03/10/20 0719)  BP: (!) 85/65 (03/10/20 0630)  SpO2: 100 % (03/10/20 0719) Vital Signs (24h Range):  Temp:  [97.2 °F (36.2 °C)-98.5 °F (36.9 °C)] 98.5 °F (36.9 °C)  Pulse:  [57-78] 67  Resp:  [8-18] 12  SpO2:  [95 %-100 %] 100 %  BP: ()/(38-98) 85/65     Weight: 64.9 kg (143 lb 1.3 oz) (03/09/20 0505)  Body mass index is 20.53 kg/m².      Intake/Output Summary (Last 24 hours) at 3/10/2020 0827  Last data filed at 3/10/2020 0705  Gross per 24 hour   Intake 3060 ml   Output 1570 ml   Net 1490 ml       Lines/Drains/Airways     Central Venous Catheter Line            Percutaneous Central Line Insertion/Assessment - Triple Lumen  03/08/20 left femoral vein 2 days          Peripheral Intravenous Line                 Peripheral IV - Single Lumen 03/07/20 1350 20 G Right Forearm 2 days         Peripheral IV - Single Lumen 03/08/20 0918 18 G Left Wrist 1 day                Physical Exam   Constitutional: He appears well-developed. No distress.   HENT:   Head: Normocephalic and atraumatic.   Eyes: EOM are normal.   Cardiovascular: Normal rate and regular rhythm.   Pulmonary/Chest: Effort normal and breath sounds normal. No respiratory distress. He has no wheezes.   Abdominal: Soft. Bowel sounds are normal. He exhibits no distension. There is no tenderness.   Neurological:   Opens eyes today, but doesn't speak.    Skin: He is not diaphoretic.       Significant Labs:  CBC:   Recent Labs   Lab 03/08/20  1128  03/09/20  0401  03/09/20  1800 03/09/20  2334 03/10/20  0411   WBC 13.74*  --  10.08  --   --   --  8.38   HGB 7.3*   <  > 9.7*  9.7*   < > 10.0* 9.3* 9.1*  9.1*   HCT 21.7*   < > 27.5*  27.5*   < > 29.1* 27.1* 27.4*  27.4*     --  138*  --   --   --  125*    < > = values in this interval not displayed.     CMP:   Recent Labs   Lab 03/10/20  0411   GLU 81   CALCIUM 7.0*   ALBUMIN 1.7*   PROT 4.0*      K 3.5   CO2 24   *   BUN 33*   CREATININE 0.8   ALKPHOS 60   ALT 12   AST 20   BILITOT 0.4         Significant Imaging:  Imaging results within the past 24 hours have been reviewed.

## 2020-03-11 VITALS
HEART RATE: 64 BPM | WEIGHT: 143.06 LBS | RESPIRATION RATE: 18 BRPM | DIASTOLIC BLOOD PRESSURE: 64 MMHG | OXYGEN SATURATION: 99 % | TEMPERATURE: 98 F | SYSTOLIC BLOOD PRESSURE: 101 MMHG | HEIGHT: 70 IN | BODY MASS INDEX: 20.48 KG/M2

## 2020-03-11 PROBLEM — L30.8 DERMATITIS ASSOCIATED WITH MOISTURE: Status: RESOLVED | Noted: 2020-03-09 | Resolved: 2020-03-11

## 2020-03-11 LAB
ABO + RH BLD: NORMAL
ALBUMIN SERPL BCP-MCNC: 1.6 G/DL (ref 3.5–5.2)
ALP SERPL-CCNC: 81 U/L (ref 55–135)
ALT SERPL W/O P-5'-P-CCNC: <5 U/L (ref 10–44)
ANION GAP SERPL CALC-SCNC: 6 MMOL/L (ref 8–16)
AST SERPL-CCNC: 18 U/L (ref 10–40)
BASOPHILS # BLD AUTO: 0.05 K/UL (ref 0–0.2)
BASOPHILS NFR BLD: 0.5 % (ref 0–1.9)
BILIRUB SERPL-MCNC: 0.4 MG/DL (ref 0.1–1)
BLD GP AB SCN CELLS X3 SERPL QL: NORMAL
BUN SERPL-MCNC: 17 MG/DL (ref 8–23)
CALCIUM SERPL-MCNC: 7.1 MG/DL (ref 8.7–10.5)
CHLORIDE SERPL-SCNC: 118 MMOL/L (ref 95–110)
CO2 SERPL-SCNC: 22 MMOL/L (ref 23–29)
CREAT SERPL-MCNC: 0.7 MG/DL (ref 0.5–1.4)
DIFFERENTIAL METHOD: ABNORMAL
EOSINOPHIL # BLD AUTO: 0.4 K/UL (ref 0–0.5)
EOSINOPHIL NFR BLD: 3.7 % (ref 0–8)
ERYTHROCYTE [DISTWIDTH] IN BLOOD BY AUTOMATED COUNT: 19 % (ref 11.5–14.5)
EST. GFR  (AFRICAN AMERICAN): >60 ML/MIN/1.73 M^2
EST. GFR  (NON AFRICAN AMERICAN): >60 ML/MIN/1.73 M^2
FOLATE SERPL-MCNC: 13.1 NG/ML (ref 4–24)
GLUCOSE SERPL-MCNC: 92 MG/DL (ref 70–110)
HCT VFR BLD AUTO: 29.8 % (ref 40–54)
HCT VFR BLD AUTO: 29.9 % (ref 40–54)
HGB BLD-MCNC: 10 G/DL (ref 14–18)
HGB BLD-MCNC: 9.7 G/DL (ref 14–18)
IMM GRANULOCYTES # BLD AUTO: 0.1 K/UL (ref 0–0.04)
IMM GRANULOCYTES NFR BLD AUTO: 1 % (ref 0–0.5)
LYMPHOCYTES # BLD AUTO: 0.8 K/UL (ref 1–4.8)
LYMPHOCYTES NFR BLD: 7.8 % (ref 18–48)
MAGNESIUM SERPL-MCNC: 1.8 MG/DL (ref 1.6–2.6)
MCH RBC QN AUTO: 30.9 PG (ref 27–31)
MCHC RBC AUTO-ENTMCNC: 32.6 G/DL (ref 32–36)
MCV RBC AUTO: 95 FL (ref 82–98)
MONOCYTES # BLD AUTO: 1.5 K/UL (ref 0.3–1)
MONOCYTES NFR BLD: 14.8 % (ref 4–15)
NEUTROPHILS # BLD AUTO: 7.2 K/UL (ref 1.8–7.7)
NEUTROPHILS NFR BLD: 72.2 % (ref 38–73)
NRBC BLD-RTO: 0 /100 WBC
PLATELET # BLD AUTO: 166 K/UL (ref 150–350)
PMV BLD AUTO: 10.8 FL (ref 9.2–12.9)
POTASSIUM SERPL-SCNC: 3.8 MMOL/L (ref 3.5–5.1)
PROT SERPL-MCNC: 4.1 G/DL (ref 6–8.4)
RBC # BLD AUTO: 3.14 M/UL (ref 4.6–6.2)
SODIUM SERPL-SCNC: 146 MMOL/L (ref 136–145)
VIT B12 SERPL-MCNC: 808 PG/ML (ref 210–950)
WBC # BLD AUTO: 9.91 K/UL (ref 3.9–12.7)

## 2020-03-11 PROCEDURE — 27000221 HC OXYGEN, UP TO 24 HOURS

## 2020-03-11 PROCEDURE — 63600175 PHARM REV CODE 636 W HCPCS: Performed by: NURSE PRACTITIONER

## 2020-03-11 PROCEDURE — 85014 HEMATOCRIT: CPT

## 2020-03-11 PROCEDURE — 25000003 PHARM REV CODE 250: Performed by: NURSE PRACTITIONER

## 2020-03-11 PROCEDURE — 85025 COMPLETE CBC W/AUTO DIFF WBC: CPT

## 2020-03-11 PROCEDURE — 83735 ASSAY OF MAGNESIUM: CPT

## 2020-03-11 PROCEDURE — 86901 BLOOD TYPING SEROLOGIC RH(D): CPT

## 2020-03-11 PROCEDURE — 99900035 HC TECH TIME PER 15 MIN (STAT)

## 2020-03-11 PROCEDURE — 85018 HEMOGLOBIN: CPT

## 2020-03-11 PROCEDURE — C9113 INJ PANTOPRAZOLE SODIUM, VIA: HCPCS | Performed by: NURSE PRACTITIONER

## 2020-03-11 PROCEDURE — 80053 COMPREHEN METABOLIC PANEL: CPT

## 2020-03-11 RX ORDER — SUCRALFATE 1 G/10ML
1 SUSPENSION ORAL
Qty: 280 ML | Refills: 0
Start: 2020-03-11 | End: 2020-03-18

## 2020-03-11 RX ADMIN — PANTOPRAZOLE SODIUM 40 MG: 40 INJECTION, POWDER, LYOPHILIZED, FOR SOLUTION INTRAVENOUS at 08:03

## 2020-03-11 RX ADMIN — LEVETIRACETAM 500 MG: 5 INJECTION INTRAVENOUS at 09:03

## 2020-03-11 RX ADMIN — SODIUM CHLORIDE: 0.9 INJECTION, SOLUTION INTRAVENOUS at 04:03

## 2020-03-11 RX ADMIN — CARBIDOPA AND LEVODOPA 1 TABLET: 25; 100 TABLET ORAL at 08:03

## 2020-03-11 NOTE — PHYSICIAN QUERY
"PT Name: Adebayo Oneil  MR #: [id number]    Physician Query Form - Hematology Clarification      CDS Violeta Leon, RN, BSN        Contact Information:    [email or web address]           This form is a permanent document in the medical record.      Query Date: March 11, 2020    By submitting this query, we are merely seeking further clarification of documentation. Please utilize your independent clinical judgment when addressing the question(s) below.    The Medical record contains the following:   Indicators  Supporting Clinical Findings Location in Medical Record   X   "Anemia" documented Symptomatic anemia  Upper GI bleed 3/4 H&P    X   H & H =    3/4   07:46 3/4   10:15 3/4   13:55 3/4   19:06 3/5   05:07 3/5   08:28 3/6   05:23   Hemoglobin 5.5 (LL) 5.3 (LL) 6.6 (L) 7.9 (L) 7.6 (L) 7.3 (L) 7.7 (L)   Hematocrit 19.1 (LL) 18.2 (LL) 21.7 (L) 25.1 (L) 23.5 (L) 22.9 (L) 25.0 (L)    Labs 3/4 --> 3/6     BP =                     HR= 91/61 -- 124/51  HR:  77 -- 82    [facility] reports that pt was confused and weak at the scene, with a systolic BP of 80. Pt was given 1 liter of normal saline en route to the ED. 3/4 ER MD notes    X   "GI bleeding" documented Upper GI bleed 3/4 H&P     Acute bleeding (Non GI site)     X   Transfusion(s) 4 units p RBC transfused  3/4 blood bank    X   Treatment: Impression:    - Z-line regular, 40 cm from the incisors.                        - No gross lesions in esophagus.                        - Non-bleeding gastric ulcer with adherent clot.                        - Normal duodenal bulb and second portion of the                             duodenum.                        - Normal hypopharynx.                        - No specimens collected. 3/5 EGD    X   Other:  69 year old male with Parkinson disease, hypertension and anxiety who resides at the [facility] and presented to the ED with reports of altered mentation 3/4 H&P      Provider, please specify diagnosis or diagnoses " associated with above clinical findings.    [  x] Acute blood loss anemia   [  ] Chronic blood loss anemia     [  ] Other Hematological Diagnosis (please specify):     [  ] Clinically Undetermined       Please document in your progress notes daily for the duration of treatment, until resolved, and include in your discharge summary.

## 2020-03-11 NOTE — PLAN OF CARE
Patient returning to [facility] in [place], [place]. [facility] nurse arranged transportation and faxed appropriate paperwork to the [facility].       Discharge : Return to [facility]    # for report: [phone]    Transportation: [facility].      Contacted SUAD Dutta notified of discharge. She stated that she is aware of discharge.       03/11/20 1141   Post-Acute Status   Post-Acute Authorization Other   Other Status See Comments   Discharge Delays (!) Other

## 2020-03-11 NOTE — DISCHARGE SUMMARY
"[facility] Hospital Medicine  Discharge Summary      Patient Name: Adebayo Oneil  MRN: [id number]  Admission Date: 3/7/2020  Hospital Length of Stay: 3 days  Discharge Date and Time:  03/11/2020 11:34 AM  Attending Physician: Joao Cano MD   Discharging Provider: Joao Cano MD  Primary Care Provider: Provider Notinsystem      HPI:   Adebayo Oneil is a 69 y.o. 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 Pt was unresponsive at 9:30 this morning and [facility] was called. Upon AMS arrival pt was responsive but mumbled his speech and was "more confused" than baseline. Pt was discharged yesterday with hytrin. [facility] states that pt's blood pressure was 75/43 upon arrival and 80/35 en route. Associated sxs include confusion.   In the ED, pt was initial hypotensive, WBCs 23.34,  hemoglobin of 6.6, creatinine of 1.3 and BUN of 59, albumin of 2.4 and lactic acid of 3.7. CT head negative for acute findings. CT abdomen showed prominent elevation of the right diaphragm with compressive atelectasis right lung base. Aneurysmal dilatation of the aortic root measuring 4 cm. Constipation with little high-grade rectal fecal impaction. Abdominal aortic ectasia with maximum luminal diameter 3.1 cm. His code status is noted to be DNR and Danni Howell is his power of .   He will be kept on OBS for lactic acidosis under the care of Hospital Medicine    Procedure(s) (LRB):  GI BLEED (N/A)      Hospital Course:   Patient was admitted for anemia secondary to upper GI bleed.  EGD was performed and bleeding gastric " ulcers were noted.  Patient developed large bleed during EGD which required IR consult.  Angiography was performed and bleeding was successfully stopped.  Patient required large transfusion protocol during stay.  H&H remained stable post transfusion. PPI was started and sucralfate was added.  Recommendations were made to continue PPI therapy b.i.d. for at least weeks and sucralfate q.i.d. for least 1 week.  Liquid solution of sucralfate was ordered and discussed with caretaker at if sucralfate tablets were to be used recommended dissolving tablets in water.  Of note patient's mental status was a concern during stay.  Altered mental status workup was initiated however patient's mentation returned quickly to baseline. Patient was  discharged to [facility].     Consults:   Consults (From admission, onward)        Status Ordering Provider     Inpatient consult to Gastroenterology  Once     Provider:  Gilda Mast MD    Completed ARIADNE CATALAN     Inpatient consult to Pulmonology  Once     Provider:  Vini Bishop MD    Completed TIMOTHY MCCALL          No new Assessment & Plan notes have been filed under this hospital service since the last note was generated.  Service: Hospital Medicine    Final Active Diagnoses:    Diagnosis Date Noted POA    PRINCIPAL PROBLEM:  Acute upper GI bleed of gastric artery [K92.2] 03/08/2020 Yes    Acute encephalopathy [G93.40] 03/10/2020 No    Fecal impaction of colon [K56.41] 03/08/2020 Yes    ELDA (generalized anxiety disorder) [F41.1] 03/08/2020 Yes     Chronic    Acute blood loss anemia [D62] 03/04/2020 Yes    Hyperlipidemia, mixed [E78.2]  Yes    Essential hypertension [I10]  Yes    Other seizures [G40.89]  Yes    Parkinson disease [G20]  Yes     Chronic      Problems Resolved During this Admission:    Diagnosis Date Noted Date Resolved POA    Hypokalemia [E87.6] 03/10/2020 03/10/2020 No    Dermatitis associated with moisture [L30.8] 03/09/2020 03/11/2020 Yes     Shock circulatory [R57.9] 03/08/2020 03/10/2020 No    On mechanically assisted ventilation [Z99.11] 03/08/2020 03/10/2020 Not Applicable    Leukocytosis [D72.829] 03/07/2020 03/10/2020 Yes    Lactic acidosis [E87.2] 03/04/2020 03/10/2020 Yes       Discharged Condition: good    Disposition: Home or Self Care    Follow Up:  Follow-up Information     Provider Not[facility]ystem In 1 week.               Patient Instructions:   No discharge procedures on file.    Significant Diagnostic Studies:   Results for orders placed or performed during the hospital encounter of 03/07/20   Influenza A & B by Molecular   Result Value Ref Range    Influenza A, Molecular Negative Negative    Influenza B, Molecular Negative Negative    Flu A & B Source Nasal swab    Blood culture #1 **CANNOT BE ORDERED STAT**   Result Value Ref Range    Blood Culture, Routine No Growth to date     Blood Culture, Routine No Growth to date     Blood Culture, Routine No Growth to date     Blood Culture, Routine No Growth to date    Blood culture #2 **CANNOT BE ORDERED STAT**   Result Value Ref Range    Blood Culture, Routine No Growth to date     Blood Culture, Routine No Growth to date     Blood Culture, Routine No Growth to date     Blood Culture, Routine No Growth to date    Brain natriuretic peptide   Result Value Ref Range    BNP 31 0 - 99 pg/mL   CBC auto differential   Result Value Ref Range    WBC 23.34 (H) 3.90 - 12.70 K/uL    RBC 2.33 (L) 4.60 - 6.20 M/uL    Hemoglobin 6.6 (L) 14.0 - 18.0 g/dL    Hematocrit 21.3 (L) 40.0 - 54.0 %    Mean Corpuscular Volume 91 82 - 98 fL    Mean Corpuscular Hemoglobin 28.3 27.0 - 31.0 pg    Mean Corpuscular Hemoglobin Conc 31.0 (L) 32.0 - 36.0 g/dL    RDW 16.7 (H) 11.5 - 14.5 %    Platelets 274 150 - 350 K/uL    MPV 10.6 9.2 - 12.9 fL    Immature Granulocytes 0.8 (H) 0.0 - 0.5 %    Gran # (ANC) 21.2 (H) 1.8 - 7.7 K/uL    Immature Grans (Abs) 0.18 (H) 0.00 - 0.04 K/uL    Lymph # 0.6 (L) 1.0 - 4.8 K/uL    Mono # 1.4 (H)  0.3 - 1.0 K/uL    Eos # 0.0 0.0 - 0.5 K/uL    Baso # 0.04 0.00 - 0.20 K/uL    nRBC 0 0 /100 WBC    Gran% 90.7 (H) 38.0 - 73.0 %    Lymph% 2.4 (L) 18.0 - 48.0 %    Mono% 5.8 4.0 - 15.0 %    Eosinophil% 0.1 0.0 - 8.0 %    Basophil% 0.2 0.0 - 1.9 %    Platelet Estimate Appears normal     Aniso Slight     Poik Slight     Ovalocytes Occasional     Matheus Cells Occasional     Differential Method Automated    Comprehensive metabolic panel   Result Value Ref Range    Sodium 146 (H) 136 - 145 mmol/L    Potassium 4.4 3.5 - 5.1 mmol/L    Chloride 111 (H) 95 - 110 mmol/L    CO2 18 (L) 23 - 29 mmol/L    Glucose 126 (H) 70 - 110 mg/dL    BUN, Bld 59 (H) 8 - 23 mg/dL    Creatinine 1.3 0.5 - 1.4 mg/dL    Calcium 8.0 (L) 8.7 - 10.5 mg/dL    Total Protein 5.2 (L) 6.0 - 8.4 g/dL    Albumin 2.4 (L) 3.5 - 5.2 g/dL    Total Bilirubin 0.2 0.1 - 1.0 mg/dL    Alkaline Phosphatase 81 55 - 135 U/L    AST 16 10 - 40 U/L    ALT 15 10 - 44 U/L    Anion Gap 17 (H) 8 - 16 mmol/L    eGFR if African American >60 >60 mL/min/1.73 m^2    eGFR if non African American 55 (A) >60 mL/min/1.73 m^2   Lactic acid, plasma   Result Value Ref Range    Lactate (Lactic Acid) 3.7 (HH) 0.5 - 2.2 mmol/L   Protime-INR   Result Value Ref Range    Prothrombin Time 10.8 9.0 - 12.5 sec    INR 1.0 0.8 - 1.2   Troponin I   Result Value Ref Range    Troponin I <0.006 0.000 - 0.026 ng/mL   Urinalysis, Reflex to Urine Culture Urine, Clean Catch   Result Value Ref Range    Specimen UA Urine, Catheterized     Color, UA Yellow Yellow, Straw, Alma Delia    Appearance, UA Clear Clear    pH, UA 5.0 5.0 - 8.0    Specific Gravity, UA 1.020 1.005 - 1.030    Protein, UA Negative Negative    Glucose, UA Negative Negative    Ketones, UA Negative Negative    Bilirubin (UA) Negative Negative    Occult Blood UA Negative Negative    Nitrite, UA Negative Negative    Urobilinogen, UA Negative <2.0 EU/dL    Leukocytes, UA Negative Negative   Procalcitonin   Result Value Ref Range    Procalcitonin 0.14  <0.25 ng/mL   Lactic acid, plasma   Result Value Ref Range    Lactate (Lactic Acid) 2.2 0.5 - 2.2 mmol/L   Hemoglobin   Result Value Ref Range    Hemoglobin 6.9 (L) 14.0 - 18.0 g/dL   Hematocrit   Result Value Ref Range    Hematocrit 22.3 (L) 40.0 - 54.0 %   Basic Metabolic Panel (BMP)   Result Value Ref Range    Sodium 147 (H) 136 - 145 mmol/L    Potassium 4.1 3.5 - 5.1 mmol/L    Chloride 114 (H) 95 - 110 mmol/L    CO2 20 (L) 23 - 29 mmol/L    Glucose 111 (H) 70 - 110 mg/dL    BUN, Bld 62 (H) 8 - 23 mg/dL    Creatinine 1.1 0.5 - 1.4 mg/dL    Calcium 7.7 (L) 8.7 - 10.5 mg/dL    Anion Gap 13 8 - 16 mmol/L    eGFR if African American >60 >60 mL/min/1.73 m^2    eGFR if non African American >60 >60 mL/min/1.73 m^2   Hematocrit   Result Value Ref Range    Hematocrit 26.0 (L) 40.0 - 54.0 %   Hemoglobin   Result Value Ref Range    Hemoglobin 8.8 (L) 14.0 - 18.0 g/dL   CBC auto differential   Result Value Ref Range    WBC 11.25 3.90 - 12.70 K/uL    RBC 2.30 (L) 4.60 - 6.20 M/uL    Hemoglobin 6.6 (L) 14.0 - 18.0 g/dL    Hematocrit 21.1 (L) 40.0 - 54.0 %    Mean Corpuscular Volume 92 82 - 98 fL    Mean Corpuscular Hemoglobin 28.7 27.0 - 31.0 pg    Mean Corpuscular Hemoglobin Conc 31.3 (L) 32.0 - 36.0 g/dL    RDW 16.7 (H) 11.5 - 14.5 %    Platelets 203 150 - 350 K/uL    MPV 10.1 9.2 - 12.9 fL    Immature Granulocytes 0.4 0.0 - 0.5 %    Gran # (ANC) 9.3 (H) 1.8 - 7.7 K/uL    Immature Grans (Abs) 0.05 (H) 0.00 - 0.04 K/uL    Lymph # 0.7 (L) 1.0 - 4.8 K/uL    Mono # 1.2 (H) 0.3 - 1.0 K/uL    Eos # 0.0 0.0 - 0.5 K/uL    Baso # 0.02 0.00 - 0.20 K/uL    nRBC 0 0 /100 WBC    Gran% 82.9 (H) 38.0 - 73.0 %    Lymph% 5.9 (L) 18.0 - 48.0 %    Mono% 10.5 4.0 - 15.0 %    Eosinophil% 0.1 0.0 - 8.0 %    Basophil% 0.2 0.0 - 1.9 %    Differential Method Automated    CBC Without Differential   Result Value Ref Range    WBC 13.74 (H) 3.90 - 12.70 K/uL    RBC 2.38 (L) 4.60 - 6.20 M/uL    Hemoglobin 7.3 (L) 14.0 - 18.0 g/dL    Hematocrit 21.7 (L)  40.0 - 54.0 %    Mean Corpuscular Volume 91 82 - 98 fL    Mean Corpuscular Hemoglobin 30.7 27.0 - 31.0 pg    Mean Corpuscular Hemoglobin Conc 33.6 32.0 - 36.0 g/dL    RDW 16.4 (H) 11.5 - 14.5 %    Platelets 197 150 - 350 K/uL    MPV 10.5 9.2 - 12.9 fL   Basic metabolic panel   Result Value Ref Range    Sodium 144 136 - 145 mmol/L    Potassium 3.4 (L) 3.5 - 5.1 mmol/L    Chloride 115 (H) 95 - 110 mmol/L    CO2 20 (L) 23 - 29 mmol/L    Glucose 182 (H) 70 - 110 mg/dL    BUN, Bld 50 (H) 8 - 23 mg/dL    Creatinine 1.0 0.5 - 1.4 mg/dL    Calcium 6.6 (LL) 8.7 - 10.5 mg/dL    Anion Gap 9 8 - 16 mmol/L    eGFR if African American >60 >60 mL/min/1.73 m^2    eGFR if non African American >60 >60 mL/min/1.73 m^2   Magnesium   Result Value Ref Range    Magnesium 1.8 1.6 - 2.6 mg/dL   Lactic Acid, Plasma   Result Value Ref Range    Lactate (Lactic Acid) 2.7 (H) 0.5 - 2.2 mmol/L   Hemoglobin   Result Value Ref Range    Hemoglobin 6.1 (L) 14.0 - 18.0 g/dL   Hemoglobin   Result Value Ref Range    Hemoglobin 9.3 (L) 14.0 - 18.0 g/dL   Hematocrit   Result Value Ref Range    Hematocrit 16.9 (LL) 40.0 - 54.0 %   Urinalysis, Reflex to Urine Culture Urine, Clean Catch   Result Value Ref Range    Specimen UA Urine, Catheterized     Color, UA Yellow Yellow, Straw, Alma Delia    Appearance, UA Clear Clear    pH, UA 5.0 5.0 - 8.0    Specific Gravity, UA 1.020 1.005 - 1.030    Protein, UA Negative Negative    Glucose, UA Negative Negative    Ketones, UA Negative Negative    Bilirubin (UA) Negative Negative    Occult Blood UA Negative Negative    Nitrite, UA Negative Negative    Urobilinogen, UA Negative <2.0 EU/dL    Leukocytes, UA Negative Negative   Fibrinogen   Result Value Ref Range    Fibrinogen 245 182 - 366 mg/dL   Hemoglobin   Result Value Ref Range    Hemoglobin 10.4 (L) 14.0 - 18.0 g/dL   Hematocrit   Result Value Ref Range    Hematocrit 29.2 (L) 40.0 - 54.0 %   Hemoglobin   Result Value Ref Range    Hemoglobin 9.9 (L) 14.0 - 18.0 g/dL    Hematocrit   Result Value Ref Range    Hematocrit 28.0 (L) 40.0 - 54.0 %   CBC auto differential   Result Value Ref Range    WBC 10.08 3.90 - 12.70 K/uL    RBC 3.00 (L) 4.60 - 6.20 M/uL    Hemoglobin 9.7 (L) 14.0 - 18.0 g/dL    Hematocrit 27.5 (L) 40.0 - 54.0 %    Mean Corpuscular Volume 92 82 - 98 fL    Mean Corpuscular Hemoglobin 32.3 (H) 27.0 - 31.0 pg    Mean Corpuscular Hemoglobin Conc 35.3 32.0 - 36.0 g/dL    RDW 17.7 (H) 11.5 - 14.5 %    Platelets 138 (L) 150 - 350 K/uL    MPV 10.7 9.2 - 12.9 fL    Immature Granulocytes 0.6 (H) 0.0 - 0.5 %    Gran # (ANC) 7.0 1.8 - 7.7 K/uL    Immature Grans (Abs) 0.06 (H) 0.00 - 0.04 K/uL    Lymph # 1.1 1.0 - 4.8 K/uL    Mono # 1.6 (H) 0.3 - 1.0 K/uL    Eos # 0.3 0.0 - 0.5 K/uL    Baso # 0.03 0.00 - 0.20 K/uL    nRBC 1 (A) 0 /100 WBC    Gran% 69.6 38.0 - 73.0 %    Lymph% 10.8 (L) 18.0 - 48.0 %    Mono% 16.0 (H) 4.0 - 15.0 %    Eosinophil% 2.7 0.0 - 8.0 %    Basophil% 0.3 0.0 - 1.9 %    Differential Method Automated    Comprehensive metabolic panel   Result Value Ref Range    Sodium 146 (H) 136 - 145 mmol/L    Potassium 3.2 (L) 3.5 - 5.1 mmol/L    Chloride 116 (H) 95 - 110 mmol/L    CO2 21 (L) 23 - 29 mmol/L    Glucose 95 70 - 110 mg/dL    BUN, Bld 53 (H) 8 - 23 mg/dL    Creatinine 0.8 0.5 - 1.4 mg/dL    Calcium 6.9 (LL) 8.7 - 10.5 mg/dL    Total Protein 3.9 (L) 6.0 - 8.4 g/dL    Albumin 1.8 (L) 3.5 - 5.2 g/dL    Total Bilirubin 0.4 0.1 - 1.0 mg/dL    Alkaline Phosphatase 49 (L) 55 - 135 U/L    AST 18 10 - 40 U/L    ALT 5 (L) 10 - 44 U/L    Anion Gap 9 8 - 16 mmol/L    eGFR if African American >60 >60 mL/min/1.73 m^2    eGFR if non African American >60 >60 mL/min/1.73 m^2   Hemoglobin   Result Value Ref Range    Hemoglobin 9.7 (L) 14.0 - 18.0 g/dL   Magnesium   Result Value Ref Range    Magnesium 1.8 1.6 - 2.6 mg/dL   Protime-INR   Result Value Ref Range    Prothrombin Time 11.2 9.0 - 12.5 sec    INR 1.1 0.8 - 1.2   Fibrinogen   Result Value Ref Range    Fibrinogen 370 (H)  182 - 366 mg/dL   Lactic Acid, Plasma   Result Value Ref Range    Lactate (Lactic Acid) 1.0 0.5 - 2.2 mmol/L   Hematocrit   Result Value Ref Range    Hematocrit 27.5 (L) 40.0 - 54.0 %   Calcium, ionized   Result Value Ref Range    Calcium, Ion 1.12 1.06 - 1.42 mmol/L   Hematocrit   Result Value Ref Range    Hematocrit 28.0 (L) 40.0 - 54.0 %   Hemoglobin   Result Value Ref Range    Hemoglobin 9.7 (L) 14.0 - 18.0 g/dL   Hematocrit   Result Value Ref Range    Hematocrit 29.1 (L) 40.0 - 54.0 %   Hemoglobin   Result Value Ref Range    Hemoglobin 10.0 (L) 14.0 - 18.0 g/dL   Hematocrit   Result Value Ref Range    Hematocrit 27.1 (L) 40.0 - 54.0 %   Hemoglobin   Result Value Ref Range    Hemoglobin 9.3 (L) 14.0 - 18.0 g/dL   CBC auto differential   Result Value Ref Range    WBC 8.38 3.90 - 12.70 K/uL    RBC 2.89 (L) 4.60 - 6.20 M/uL    Hemoglobin 9.1 (L) 14.0 - 18.0 g/dL    Hematocrit 27.4 (L) 40.0 - 54.0 %    Mean Corpuscular Volume 95 82 - 98 fL    Mean Corpuscular Hemoglobin 31.5 (H) 27.0 - 31.0 pg    Mean Corpuscular Hemoglobin Conc 33.2 32.0 - 36.0 g/dL    RDW 18.9 (H) 11.5 - 14.5 %    Platelets 125 (L) 150 - 350 K/uL    MPV 10.8 9.2 - 12.9 fL    Immature Granulocytes 0.2 0.0 - 0.5 %    Gran # (ANC) 5.7 1.8 - 7.7 K/uL    Immature Grans (Abs) 0.02 0.00 - 0.04 K/uL    Lymph # 0.8 (L) 1.0 - 4.8 K/uL    Mono # 1.4 (H) 0.3 - 1.0 K/uL    Eos # 0.4 0.0 - 0.5 K/uL    Baso # 0.03 0.00 - 0.20 K/uL    nRBC 0 0 /100 WBC    Gran% 67.9 38.0 - 73.0 %    Lymph% 9.8 (L) 18.0 - 48.0 %    Mono% 16.9 (H) 4.0 - 15.0 %    Eosinophil% 4.8 0.0 - 8.0 %    Basophil% 0.4 0.0 - 1.9 %    Differential Method Automated    Comprehensive metabolic panel   Result Value Ref Range    Sodium 145 136 - 145 mmol/L    Potassium 3.5 3.5 - 5.1 mmol/L    Chloride 119 (H) 95 - 110 mmol/L    CO2 24 23 - 29 mmol/L    Glucose 81 70 - 110 mg/dL    BUN, Bld 33 (H) 8 - 23 mg/dL    Creatinine 0.8 0.5 - 1.4 mg/dL    Calcium 7.0 (L) 8.7 - 10.5 mg/dL    Total Protein 4.0  (L) 6.0 - 8.4 g/dL    Albumin 1.7 (L) 3.5 - 5.2 g/dL    Total Bilirubin 0.4 0.1 - 1.0 mg/dL    Alkaline Phosphatase 60 55 - 135 U/L    AST 20 10 - 40 U/L    ALT 12 10 - 44 U/L    Anion Gap 2 (L) 8 - 16 mmol/L    eGFR if African American >60 >60 mL/min/1.73 m^2    eGFR if non African American >60 >60 mL/min/1.73 m^2   Magnesium   Result Value Ref Range    Magnesium 2.0 1.6 - 2.6 mg/dL   Hematocrit   Result Value Ref Range    Hematocrit 27.4 (L) 40.0 - 54.0 %   Hemoglobin   Result Value Ref Range    Hemoglobin 9.1 (L) 14.0 - 18.0 g/dL   Hematocrit   Result Value Ref Range    Hematocrit 31.5 (L) 40.0 - 54.0 %   Hemoglobin   Result Value Ref Range    Hemoglobin 10.2 (L) 14.0 - 18.0 g/dL   TSH   Result Value Ref Range    TSH 1.911 0.400 - 4.000 uIU/mL   Vitamin B12   Result Value Ref Range    Vitamin B-12 808 210 - 950 pg/mL   Folate   Result Value Ref Range    Folate 13.1 4.0 - 24.0 ng/mL   CBC auto differential   Result Value Ref Range    WBC 9.91 3.90 - 12.70 K/uL    RBC 3.14 (L) 4.60 - 6.20 M/uL    Hemoglobin 9.7 (L) 14.0 - 18.0 g/dL    Hematocrit 29.8 (L) 40.0 - 54.0 %    Mean Corpuscular Volume 95 82 - 98 fL    Mean Corpuscular Hemoglobin 30.9 27.0 - 31.0 pg    Mean Corpuscular Hemoglobin Conc 32.6 32.0 - 36.0 g/dL    RDW 19.0 (H) 11.5 - 14.5 %    Platelets 166 150 - 350 K/uL    MPV 10.8 9.2 - 12.9 fL    Immature Granulocytes 1.0 (H) 0.0 - 0.5 %    Gran # (ANC) 7.2 1.8 - 7.7 K/uL    Immature Grans (Abs) 0.10 (H) 0.00 - 0.04 K/uL    Lymph # 0.8 (L) 1.0 - 4.8 K/uL    Mono # 1.5 (H) 0.3 - 1.0 K/uL    Eos # 0.4 0.0 - 0.5 K/uL    Baso # 0.05 0.00 - 0.20 K/uL    nRBC 0 0 /100 WBC    Gran% 72.2 38.0 - 73.0 %    Lymph% 7.8 (L) 18.0 - 48.0 %    Mono% 14.8 4.0 - 15.0 %    Eosinophil% 3.7 0.0 - 8.0 %    Basophil% 0.5 0.0 - 1.9 %    Differential Method Automated    Comprehensive metabolic panel   Result Value Ref Range    Sodium 146 (H) 136 - 145 mmol/L    Potassium 3.8 3.5 - 5.1 mmol/L    Chloride 118 (H) 95 - 110 mmol/L     CO2 22 (L) 23 - 29 mmol/L    Glucose 92 70 - 110 mg/dL    BUN, Bld 17 8 - 23 mg/dL    Creatinine 0.7 0.5 - 1.4 mg/dL    Calcium 7.1 (L) 8.7 - 10.5 mg/dL    Total Protein 4.1 (L) 6.0 - 8.4 g/dL    Albumin 1.6 (L) 3.5 - 5.2 g/dL    Total Bilirubin 0.4 0.1 - 1.0 mg/dL    Alkaline Phosphatase 81 55 - 135 U/L    AST 18 10 - 40 U/L    ALT <5 (L) 10 - 44 U/L    Anion Gap 6 (L) 8 - 16 mmol/L    eGFR if African American >60 >60 mL/min/1.73 m^2    eGFR if non African American >60 >60 mL/min/1.73 m^2   Magnesium   Result Value Ref Range    Magnesium 1.8 1.6 - 2.6 mg/dL   Hematocrit   Result Value Ref Range    Hematocrit 29.9 (L) 40.0 - 54.0 %   Hemoglobin   Result Value Ref Range    Hemoglobin 10.0 (L) 14.0 - 18.0 g/dL   Type & Screen   Result Value Ref Range    Group & Rh A POS     Indirect Tiffany NEG    Type & Screen   Result Value Ref Range    Group & Rh A POS     Indirect Tiffany NEG    ISTAT PROCEDURE   Result Value Ref Range    POC PH 7.439 7.35 - 7.45    POC PCO2 34.4 (L) 35 - 45 mmHg    POC PO2 258 (H) 80 - 100 mmHg    POC HCO3 23.4 (L) 24 - 28 mmol/L    POC BE -1 -2 to 2 mmol/L    POC SATURATED O2 100 95 - 100 %    POC Glucose 186 (H) 70 - 110 mg/dL    POC Sodium 147 (H) 136 - 145 mmol/L    POC Potassium 3.1 (L) 3.5 - 5.1 mmol/L    POC Ionized Calcium 1.01 (L) 1.06 - 1.42 mmol/L    POC Hematocrit <15 (L) 36 - 54 %PCV    Rate 16     Sample ARTERIAL     Site Ana/UAC     Allens Test N/A     DelSys Adult Vent     Mode PCV     PEEP 5     PiP 16     FiO2 50     IT 0.8    ISTAT PROCEDURE   Result Value Ref Range    POC PH 7.419 7.35 - 7.45    POC PCO2 35.0 35 - 45 mmHg    POC PO2 163 (H) 80 - 100 mmHg    POC HCO3 22.7 (L) 24 - 28 mmol/L    POC BE -2 -2 to 2 mmol/L    POC SATURATED O2 100 95 - 100 %    Rate 16     Sample ARTERIAL     Site Ana/UAC     Allens Test N/A     DelSys Adult Vent     Mode PCV     PEEP 8     PiP 24     FiO2 35     IP 16     IT .84    Prepare RBC 1 Unit   Result Value Ref Range    UNIT NUMBER  [id number]     Product Code [id number]     DISPENSE STATUS TRANSFUSED     CODING SYSTEM RCPG309     Unit Blood Type Code 6200     Unit Blood Type A POS     Unit Expiration [id number]    Prepare RBC 1 Unit   Result Value Ref Range    UNIT NUMBER [id number]     Product Code C4199N30     DISPENSE STATUS TRANSFUSED     CODING SYSTEM ASTO148     Unit Blood Type Code 6200     Unit Blood Type A POS     Unit Expiration [id number]    Prepare RBC 2 Units; active bleeding   Result Value Ref Range    UNIT NUMBER [id number]     Product Code U9483X11     DISPENSE STATUS TRANSFUSED     CODING SYSTEM PKTO328     Unit Blood Type Code 6200     Unit Blood Type A POS     Unit Expiration [id number]     UNIT NUMBER [id number]     Product Code X0675V87     DISPENSE STATUS TRANSFUSED     CODING SYSTEM OHAR291     Unit Blood Type Code 6200     Unit Blood Type A POS     Unit Expiration [id number]    Prepare RBC 2 Units; massive UGI hemorrhage   Result Value Ref Range    UNIT NUMBER [id number]     Product Code F7358J14     DISPENSE STATUS TRANSFUSED     CODING SYSTEM OEYV840     Unit Blood Type Code 6200     Unit Blood Type A POS     Unit Expiration [id number]     UNIT NUMBER [id number]     Product Code H5594Y57     DISPENSE STATUS TRANSFUSED     CODING SYSTEM OELM581     Unit Blood Type Code 6200     Unit Blood Type A POS     Unit Expiration 741222433243    Prepare Fresh Frozen Plasma 2 Units   Result Value Ref Range    UNIT NUMBER [id number]     Product Code V6075P11     DISPENSE STATUS TRANSFUSED     CODING SYSTEM EQTD652     Unit Blood Type Code 6200     Unit Blood Type A POS     Unit Expiration 505110978818     UNIT NUMBER [id number]     Product Code H3682R97     DISPENSE STATUS TRANSFUSED     CODING SYSTEM YAMW679     Unit Blood Type Code 6200     Unit Blood Type A POS     Unit Expiration 745785990933    Prepare Platelets 1 Dose   Result Value Ref Range    UNIT NUMBER [id number]     Product Code  N4865K59     DISPENSE STATUS TRANSFUSED     CODING SYSTEM ECGY654     Unit Blood Type Code 5100     Unit Blood Type O POS     Unit Expiration 822935768560    Prepare RBC 3 Units; active bleeding and Hct < 15   Result Value Ref Range    UNIT NUMBER [id number]     Product Code I8305S24     DISPENSE STATUS TRANSFUSED     CODING SYSTEM JHXX907     Unit Blood Type Code 6200     Unit Blood Type A POS     Unit Expiration [id number]     UNIT NUMBER [id number]     Product Code N3762A04     DISPENSE STATUS TRANSFUSED     CODING SYSTEM BGTJ668     Unit Blood Type Code 6200     Unit Blood Type A POS     Unit Expiration 433772585349     UNIT NUMBER [id number]     Product Code C5698A91     DISPENSE STATUS TRANSFUSED     CODING SYSTEM WEPY753     Unit Blood Type Code 6200     Unit Blood Type A POS     Unit Expiration 733316600191    Prepare Cryoprecipitate 1 Dose   Result Value Ref Range    UNIT NUMBER [id number]     Product Code B9770T12     DISPENSE STATUS TRANSFUSED     CODING SYSTEM TGRZ823     Unit Blood Type Code E000     Unit Blood Type X POS     Unit Expiration [id number]         Pending Diagnostic Studies:     Procedure Component Value Units Date/Time    Hematocrit [[id number]]     Order Status:  Sent Lab Status:  No result     Specimen:  Blood     Hematocrit [[id number]] Collected:  03/08/20 1537    Order Status:  Sent Lab Status:  In process Updated:  03/08/20 1537    Specimen:  Blood     Hemoglobin [[id number]]     Order Status:  Sent Lab Status:  No result     Specimen:  Blood          Medications:  Reconciled Home Medications:      Medication List      START taking these medications    sucralfate 100 mg/mL suspension  Commonly known as:  CARAFATE  Take 10 mLs (1 g total) by mouth 4 (four) times daily before meals and nightly. for 7 days        CONTINUE taking these medications    acetaminophen 650 MG Tbsr  Commonly known as:  TYLENOL  Take 650 mg by mouth every 6 (six) hours as needed.      carbidopa-levodopa  mg  mg per tablet  Commonly known as:  SINEMET  Take 1 tablet by mouth 3 (three) times daily.     cholecalciferol (vitamin D3) 125 mcg (5,000 unit) Tab  Take 5,000 Units by mouth.     cyanocobalamin 1,000 mcg/mL injection  Inject 1,000 mcg into the muscle.     levETIRAcetam 100 mg/mL Soln  Commonly known as:  KEPPRA  Take 500 mg by mouth 2 (two) times daily.     pantoprazole 40 MG tablet  Commonly known as:  PROTONIX  Take 1 tablet (40 mg total) by mouth 2 (two) times daily.     potassium chloride SA 20 MEQ tablet  Commonly known as:  K-DUR,KLOR-CON  Take 20 mEq by mouth.     ZOCOR 20 MG tablet  Generic drug:  simvastatin  Take 20 mg by mouth every evening.        STOP taking these medications    CALCIUM CARBONATE ORAL            Indwelling Lines/Drains at time of discharge:   Lines/Drains/Airways     None                 Time spent on the discharge of patient: 36 minutes  Patient was seen and examined on the date of discharge and determined to be suitable for discharge.         Joao Cano MD  Department of Hospital Medicine  [facility] -

## 2020-03-11 NOTE — NURSING
Discharged order received and reviewed with patient and family. Patient instructed when to take each medication next dose. IV removed, tele removed. Patient assisted with dressing by staff. Report given to [facility] in [place]. Patient awaiting arrival of transportation.

## 2020-03-11 NOTE — PHYSICIAN QUERY
PT Name: Adebayo Oneil  MR #: [id number]     PHYSICIAN QUERY -  ACUITY OF CONDITION CLARIFICATION    CDS Violeta Leon, RN, BSN        Contact Information:    [email or web address]       This form is a permanent document in the medical record.     Query Date: March 11, 2020    By submitting this query, we are merely seeking further clarification of documentation to reflect the severity of illness of your patient. Please utilize your independent clinical judgment when addressing the question(s) below.    The Medical record reflects the following:     Indicators   Supporting Clinical Findings Location in Medical Record   X   Documentation of condition EGD results showed one non-bleeding cratered gastric ulcer with adherent clot was found on the greater curvature of the gastric body. 3/6 Discharge summary   X   Lab Value(s)    3/4   07:46 3/4  10:15 3/4   13:55 3/4  19:06 3/5   05:07 3/5   08:28 3/6   05:23   Hemoglobin 5.5 (LL) 5.3 (LL) 6.6 (L) 7.9 (L) 7.6 (L) 7.3 (L) 7.7 (L)   Hematocrit 19.1 (LL) 18.2 (LL) 21.7 (L) 25.1 (L) 23.5 (L) 22.9 (L) 25.0 (L)    Labs 3/4 --> 3/6     Radiology Findings     X   Treatment                                 Medication continue Protonix 40 mg PO BID, GI to arrange EGD in 2 weeks to evaluate response to therapy      4 units pRBC transfused  3/6 Discharge summary    3/4 Blood bank    X   Other 69 year old male with Parkinson disease, hypertension and anxiety presented to the ED with reports of altered mentation  3/6 Discharge summary     Provider, please specify the acuity/chronicity of    gastric ulcer    [ x  ] Acute   [   ] Chronic   [   ] Subacute   [   ]  Clinically Undetermined       Please document in your progress notes daily for the duration of treatment until resolved, and include in your discharge summary.

## 2020-03-11 NOTE — NURSING TRANSFER
Nursing Transfer Note      3/11/2020     Transfer To: 223    Transfer via bed    Transfer with cardiac monitoring & 2L NC      Transported by MARSHALL Mcintyre Charge Nurse     Medicines sent: None    Chart send with patient: Yes      Jeremi Graham.

## 2020-03-11 NOTE — NURSING
[facility]. [facility] order placed to arrange ambulance transport for pt back to [place] [facility] in [place], [place]. Updated MARSHALL García pt's primary nurse.

## 2020-03-11 NOTE — PLAN OF CARE
Problem: Wound  Goal: Optimal Wound Healing  Outcome: Ongoing, Progressing     Problem: Fall Injury Risk  Goal: Absence of Fall and Fall-Related Injury  Outcome: Ongoing, Progressing  Intervention: Identify and Manage Contributors to Fall Injury Risk  Flowsheets (Taken 3/11/2020 0520)  Medication Review/Management: medications reviewed  Intervention: Promote Injury-Free Environment  Flowsheets (Taken 3/11/2020 0520)  Safety Promotion/Fall Prevention: bed alarm set; pulse ox; instructed to call staff for mobility  Environmental Safety Modification: clutter free environment maintained     Problem: Adult Inpatient Plan of Care  Goal: Plan of Care Review  Outcome: Ongoing, Progressing  Goal: Patient-Specific Goal (Individualization)  Outcome: Ongoing, Progressing  Goal: Absence of Hospital-Acquired Illness or Injury  Outcome: Ongoing, Progressing  Intervention: Identify and Manage Fall Risk  Flowsheets (Taken 3/11/2020 0520)  Safety Promotion/Fall Prevention: bed alarm set; pulse ox; instructed to call staff for mobility  Goal: Optimal Comfort and Wellbeing  Outcome: Ongoing, Progressing  Goal: Readiness for Transition of Care  Outcome: Ongoing, Progressing  Goal: Rounds/Family Conference  Outcome: Ongoing, Progressing     Problem: Skin Injury Risk Increased  Goal: Skin Health and Integrity  Outcome: Ongoing, Progressing     Problem: Anemia  Goal: Anemia Symptom Improvement  Outcome: Ongoing, Progressing     Problem: Oral Intake Inadequate  Goal: Improved Oral Intake  Outcome: Ongoing, Progressing

## 2020-03-11 NOTE — PLAN OF CARE
03/11/20 1556   Final Note   Assessment Type Final Discharge Note   Anticipated Discharge Disposition [facility]   Right Care Referral Info   Facility Name [facility]

## 2020-03-13 LAB
BACTERIA BLD CULT: NORMAL
BACTERIA BLD CULT: NORMAL

## 2020-03-13 NOTE — ANESTHESIA POSTPROCEDURE EVALUATION
Anesthesia Post Evaluation    Patient: Adebayo Oneil    Procedure(s) Performed: Procedure(s) (LRB):  EGD (ESOPHAGOGASTRODUODENOSCOPY) (N/A)    Final Anesthesia Type: general    Patient location during evaluation: PACU  Patient participation: Yes- Able to Participate  Level of consciousness: awake and alert  Post-procedure vital signs: reviewed and stable  Pain management: adequate  Airway patency: patent  KARTIK mitigation strategies: Extubation while patient is awake  PONV status at discharge: No PONV  Anesthetic complications: no      Cardiovascular status: hemodynamically stable  Respiratory status: spontaneous ventilation  Hydration status: euvolemic  Follow-up not needed.          Vitals Value Taken Time   /64 3/11/2020 11:22 AM   Temp 36.4 °C (97.5 °F) 3/11/2020 11:22 AM   Pulse 64 3/11/2020 11:22 AM   Resp 18 3/11/2020 11:22 AM   SpO2 99 % 3/11/2020 11:22 AM         Event Time     Out of Recovery 16:42:26          Pain/Jaclyn Score: No data recorded

## 2020-03-16 NOTE — PHYSICIAN QUERY
"PT Name: Adebayo Oneil  MR #: [id number]     CDS/: Candelaria Reddy RN, CDS               Contact information: [email or web address]     This form is a permanent document in the medical record.     Query Date: March 16, 2020    Physician Query - Neurological Condition Clarification    By submitting this query, we are merely seeking further clarification of documentation to reflect the severity of illness of your patient. Please utilize your independent clinical judgment when addressing the question(s) below.    The Medical record reflects the following:     Indicators   Supporting Clinical Findings Location in Medical Record   x AMS, Confusion,  LOC, etc.  who presents to the Emergency Department for evaluation of altered mental status which onset suddenly 4 hours PTA. Pt was unresponsive at 9:30 this morning and [facility] was called. Upon AMS arrival pt was responsive but mumbled his speech and was "more confused" than baseline    Patient was brought back to the ED today via EMS after being found altered and decrease level of consciousness. Furthermore vitals demonstrated hypotension at 75/43 and 64/48    Currently still not responsive.  Will initiate workup altered mental status.  Possible brain MRI tomorrow.  Patient is [facility] hosp[facility] at the [facility] home if his mentation improves.  If patient's mental status does not improve we will pursue inpatient hospice.     Acute encephalopathy  3/10:  Acute encephalopathy to be related to progression of chronic disease  Patient does have Parkinson's  Will check B12 TSH and folate  Possible brain mri  Possible neuro evaluation  Plan to reassess in am     Of note patient's mental status was a concern during stay.  Altered mental status workup was initiated however patient's mentation returned quickly to baseline.    H&P 3/7              GI Consult 3/7          HM PN 3/10             PN 3/10                    DC Summary 3/11   x Acute / Chronic Illness PMHx " of...Parkinson's disease    Acute upper GI bleed of gastric artery    Shock circulatory    Leukocytosis  --unsure of source  --IV zosyn empirically   H&P 3/7    HM PN 3/8    HM PN 3/8    HM PN 3/8   x Radiology Findings Impression    No acute findings.  Mild-to-moderate atrophy.     CT Head 3/7     Electrolyte Imbalance      Medication      Treatment             x Other B12: 808    TSH: 1.911 Labs 3/10    Labs 3/10     Encephalopathy- is a general term for any diffuse disease of the brain that alters brain function or structure. Treatment of the cognitive dysfunction varies but is ultimately dependent on the treatment of the underlying condition.    Major Symptoms of Encephalopathy - Decreased level of consciousness, fluctuating alertness/concentration, confusion, agitation, lethargy, somnolence, drowsiness, obtundation, stupor, or coma.         References: National Institutes of Healths (NIH) National Vernon of Neurological Disorders and Strokes;  HCPro 2016; Advisory Board     Clinical Guidelines:   These guidelines will set system standards to assist providers in managing, documentation, and coding of encephalopathy. The intent of this document is to serve as a system guideline, not replace the providers clinical judgment:  Provider, please specify the diagnosis or diagnoses associated with above clinical findings.  [ x  ] Metabolic Encephalopathy - Due to electrolye imbalance, metabolic derangements, or infections processes, includes Septic Encephalopathy   [   ] Other Encephalopathy - Includes uremic encephalopathy   [   ] Unspecified Encephalopathy      [   ] Other Neurological Condition-  Includes Post-ictal altered mental status. (please specify condition): _________   [   ]  Clinically Undetermined     Please document in your progress notes daily for the duration of treatment until resolved, and include in your discharge summary.

## 2020-03-16 NOTE — PHYSICIAN QUERY
PT Name: Adebayo Oneil  MR #: [id number]     PHYSICIAN QUERY -  ACUITY OF CONDITION CLARIFICATION      CDS/: Candelaria Reddy RN, CDS               Contact information: [email or web address]                                              This form is a permanent document in the medical record.     Query Date: March 16, 2020    By submitting this query, we are merely seeking further clarification of documentation to reflect the severity of illness of your patient. Please utilize your independent clinical judgment when addressing the question(s) below.    The Medical record reflects the following:     Indicators   Supporting Clinical Findings Location in Medical Record   x Documentation of condition Two oozing cratered gastric ulcers    Pulmonology Consult 3/8    Lab Value(s)     x Radiology Findings Two oozing cratered gastric ulcers with visible vessels in each  ulcer were found in the gastric fundus. The smaller ulcer was 20mm in size and a adherent clot was appreciated.    EGD Report 3/8   x Treatment                                 Medication PPI was started and sucralfate was added.  Recommendations were made to continue PPI therapy b.i.d. for at least weeks and sucralfate q.i.d. for least 1 week    ulcer was injected with epinephrine the largest ulcer was unsuccessfully treated with epinephrine. IR was consulted and angiography was performed. Hemostasis was achieved.     Patient has received 11 PRBCs this admission, FFP, Plts, and Cryo   DC Summary 3/11          HM PN 3/8          Pulmonology A&P Note 3/9   x Other known gastric ulcer diagnosed a few days ago.      patient was witnessed to throw up a large fresh clot. GI Consult Note 3/7    GI Consult Note 3/7     Provider, please specify the acuity/chronicity of __gastric ulcers___:    [   ] Acute   [ x  ] Acute and/on chronic   [   ] Other, please specify ____________   [   ] Clinically Undetermined       Please document in your progress notes daily for  the duration of treatment until resolved, and include in your discharge summary.

## 2020-03-16 NOTE — PHYSICIAN QUERY
PT Name: Adebayo Oneil  MR #: [id number]    Physician Query Form - Perfusion Diagnosis Clarification     CDS/: Candelaria Reddy RN, CDS               Contact information: [email or web address]    This form is a permanent document in the medical record.     Query Date: March 16, 2020    By submitting this query, we are merely seeking further clarification of documentation. Please utilize your independent clinical judgment when addressing the question(s) below.    The medical record contains the following:    Indicators   Supporting Clinical Findings   Location in Medical Record   x Acute Illness (e.g. AMI, Sepsis, etc.) Acute upper GI bleed of gastric artery HM PN 3/10   x Acidosis documented Lactic acidosis  --IVF's  --repeat lactic pending  --monitor   H&P 3/7    ABGs / Labs     x Vital Signs BP: ()/(56-69) 118/69  BP: ()/() 129/54    H&P 3/7  HM PN 3/8   x Hypotension or Low Blood Pressure documented brought back to the ED today via EMS after being found altered and decrease level of consciousness. Furthermore vitals demonstrated hypotension at 75/43 and 64/48   H&P 3/7   x Altered Mental Status or Confusion patient's mental status was a concern during stay DC Summary 3/11    Diaphoresis, Cold Extremities or Cyanosis      Oliguria     x Medication/Treatment:  -Vasopressors  -Inotropic Drugs  -IV Fluids  -Cardiac Assist Devices  -Hemodynamic Monitoring  -Blood/Blood Products Shock circulatory  3/8:  Currently on Levophed  Continue blood transfusions as indicated  Shock likely secondary to acute blood loss    Patient has received 11 PRBCs this admission, FFP, Plts, and Cryo  PN 3/8            A&P Note 3/10    Other:       Provider, please further specify the type of shock     [x   ] Hemorrhagic Shock   [   ] Hypovolemic Shock   [   ] Other Shock (please specify):   [   ] Shock Unspecified   [   ] Other Condition (please specify):   [  ] Clinically Undetermined         Please document in  your progress notes daily for the duration of treatment until resolved and include in your discharge summary.

## 2020-03-16 NOTE — PHYSICIAN QUERY
PT Name: Adebayo Oneil  MR #: [id number]     Physician Query Form - Documentation Clarification      CDS/: Candelaria Reddy RN, CDS               Contact information: [email or web address]                                                                                                                        [phone]    This form is a permanent document in the medical record.     Query Date: March 16, 2020    By submitting this query, we are merely seeking further clarification of documentation. Please utilize your independent clinical judgment when addressing the question(s) below.    The Medical record reflects the following:    Supporting Clinical Findings Location in Medical Record   Area was unsuccessfully injected with 10 mL        of a 1:10,000 solution of epinephrine for hemostasis. Coagulation        for hemostasis using bipolar probe was unsuccessful. The ulcer's        vessel began to actively spurt with failed endoscopic attempts. The        stomach began to fill with bright red blood.   EGD Report 3/8    Patient underwent EGD by GI this a.m. bleeding ulcers noted.  She acute bleeding noted during procedure which required intervention radiology consult.  Angiography was performed and bleeding was stopped with coils and particles   HM PN 3/8   Procedure: Mesenteric angiography     The right femoral artery was cannulated with a 5-Wolof sheath.  An arterial catheter was introduced and visceral angiography performed using iodinated contrast including the celiac, superior mesenteric  Active bleed off short gastric a off supperior trunk of the splenic a embolized to stasis with coils and particles.      IR Procedure Report 3/8  IR Procedure Report 3/8   IR consulted and taken to cath lab revealing: Active bleed off short gastric artery off supperior trunk of the splenic artery which was embolized to stasis with coils and particles.   Pulmonology PN 3/9                                                                             Doctor, please further specify the embolization performed     Provider Use Only     [ X  ] Occlusion (complete closure)   [   ] Restriction (reduced flow, partially obstruct)   [   ] Other, please specify ________________                                                                                                            [  ] Clinically Undetermined

## 2020-03-17 ENCOUNTER — TELEPHONE (OUTPATIENT)
Dept: ENDOSCOPY | Facility: HOSPITAL | Age: 70
End: 2020-03-17

## 2020-03-23 ENCOUNTER — TELEPHONE (OUTPATIENT)
Dept: ENDOSCOPY | Facility: HOSPITAL | Age: 70
End: 2020-03-23

## 2020-03-23 NOTE — TELEPHONE ENCOUNTER
Received call from Mariama at [facility], pt is on hospice care and will not be r/s. Pls cx orders. dw

## 2020-05-21 ENCOUNTER — TELEPHONE (OUTPATIENT)
Dept: GASTROENTEROLOGY | Facility: CLINIC | Age: 70
End: 2020-05-21

## 2020-05-21 DIAGNOSIS — K25.7 CHRONIC GASTRIC ULCER, UNSPECIFIED WHETHER GASTRIC ULCER HEMORRHAGE OR PERFORATION PRESENT: Primary | ICD-10-CM

## 2020-05-21 NOTE — TELEPHONE ENCOUNTER
Spoke with Mariama and the [facility].  Notified I was instructed to f/u with patient regarding previous procedure that was scheduled and cancelled r/t patient on hospice care.  Checking on current status, patient still on hospice care per LAUREN Hardy.  Will notify Dr. Ashraf.  MARSHALL Melchor

## 2022-02-28 NOTE — ASSESSMENT & PLAN NOTE
-Stable.   -Continue Sinemet.      [FreeTextEntry1] : Patient is currently admitted at [facility].  MR IAC 1/15 Result reviewed with Dr. Benavides neuroradiologist. patient still has 5mm temporal lobe abscess however all infections much better.   Will do another 6 weeks of IV abx and will repeat MR IAD in 4-6 weeks. \par \par Kip d/w ENT Dr. Lombardi and updated the case.\par \par Kip d/w Dr. Perry Shin, hospitalist at [facility]. I told him that I spoke to Dr. Juan MORROW yesterday and agreed to switch to meropenem.  Currently patient's diarrhea resolved and K 3.9.  He is tolerating meropenem.  I asked Dr. Shin to switch his seizure med to something else since he is currently on Depakote which interacts with meropenem and seizure threshold can be lowered.  He will reach out to neurologist and try to switch to a seizure med which doesn't interact with meropenem.  I also asked Dr. Shin to call patient's wife and update the situation. \par \par I spoke to Ms. Angel patient's wife and updated everything.  I answered all the questions.  I told her to talk to Dr. Shin about KCL supplementation and sieuzre med.  I asked her to schedule f/u appointment in 1-2 week after discharge.

## 2022-06-18 NOTE — PROCEDURES
[facility]    Name:  Remington Rojas   Age:  82 y.o.  Sex:  male  :  1939  MRN:  [id number]   Visit Number:  [id number]  Admission Date:  6/15/2022  Date Of Service:  22  Primary Care Physician:  Karoline Aceves MD     LOS: 2 days :    Chief Complaint:      Altered Mental Status    Subjective:    Patient seen and examined at bedside. Chart reviewed and events noted. Patient states that he is feeling pretty good today. Denies chest pain or dyspnea.    Hospital Course:    chronically ill 82-year-old male with history significant for Parkinson's disease, hypertension, hypothyroidism and BPH who presents from assisted living facility today with his family for altered mental status.  Patient's son reports that he called him earlier in the day and did not think that his dad was sounding his normal self.  He went to check on him and noticed that he was confused.  Normally his father is alert and oriented however he no longer was aware of the time.  Patient recently underwent cystoscopy on  by Dr. Fuchs due to his BPH.  Unsure exactly what procedure was performed.  Family states that at that time patient was in his normal state of health.  Of note, patient is extremely hard of hearing and is scheduled to get cochlear implants.  Labs significant for negative troponin, glucose 147, sodium 131, bicarb 20, chloride 97, creatinine 1.28, BUN 13.  CRP 4.4.  Lactate lipase and procalcitonin within normal limits.  WBC 24.  Hemoglobin hematocrit and platelets within normal limits.  Urinalysis positive for leukocytes, 31-50 WBC and trace bacteria.  CT abdomen pelvis shows gas within the bladder consistent with either infection or recent instrumentation.  Chest x-ray shows no acute process.  Patient received 1 dose of Rocephin in the emergency room.    Patient was admitted to the hospital and continued on Rocephin. Urine culture with gram negative bacillus  Radiology Post-Procedure Note    Pre Op Diagnosis: GI bleed    Post Op Diagnosis: GI bleed    Procedure: Mesenteric angiography    Procedure performed by: Dr Eusebio Abdul    Written Informed Consent Obtained: Yes    Specimen Removed: NO    Estimated Blood Loss: Minimal    Findings: Local anesthesia and moderate sedation were used.    The right femoral artery was cannulated with a 5-Faroese sheath.  An arterial catheter was introduced and visceral angiography performed using iodinated contrast including the celiac, superior mesenteric  Active bleed off short gastric a off supperior trunk of the splenic a embolized to stasis with coils and particles.       The catheter was removed.  Sheath left for arterial line.      Eusebio Abdul MD  Staff Radiologist  Department of Radiology  Pager: [phone]   growth thus far. Mentation has improved.    Review of Systems:     All systems were reviewed and negative except as mentioned in subjective, assessment and plan.    Vital Signs:    Temp:  [97.2 °F (36.2 °C)-98.5 °F (36.9 °C)] 97.2 °F (36.2 °C)  Heart Rate:  [62-80] 62  Resp:  [17-20] 17  BP: (115-159)/(49-84) 121/63    Intake and output:    I/O last 3 completed shifts:  In: 1690 [P.O.:590; I.V.:1100]  Out: 2775 [Urine:2775]  I/O this shift:  In: 240 [P.O.:240]  Out: -     Physical Examination:    General Appearance:  Alert and cooperative. Sitting up in bed, in NAD.   Head:  Atraumatic and normocephalic. Hard of hearing.   Eyes: Conjunctivae and sclerae normal, no icterus. No pallor.           Lungs:   Breath sounds heard bilaterally equally.  No wheezing or crackles.    Heart:  Normal S1 and S2, no murmur, no gallop, no rub. No JVD.   Abdomen:   Normal bowel sounds, no masses, no organomegaly. Soft, nontender, nondistended, no rebound tenderness.   Extremities: Supple, no edema, no cyanosis, no clubbing.   Skin: No bleeding or rash.   Neurologic: Alert and oriented x 3. No facial asymmetry. Moves all four limbs. No tremors.      Laboratory results:    Results from last 7 days   Lab Units 06/18/22  0619 06/17/22  0642 06/16/22  0553 06/15/22  2335   SODIUM mmol/L 137 133* 136 131*   POTASSIUM mmol/L 3.9 4.0 4.2 4.2   CHLORIDE mmol/L 106 105 105 97*   CO2 mmol/L 21.0* 16.8* 17.0* 20.1*   BUN mg/dL 9 10 10 13   CREATININE mg/dL 1.05 0.95 1.06 1.28*   CALCIUM mg/dL 8.1* 7.8* 8.1* 9.0   BILIRUBIN mg/dL  --   --   --  1.0   ALK PHOS U/L  --   --   --  92   ALT (SGPT) U/L  --   --   --  26   AST (SGOT) U/L  --   --   --  27   GLUCOSE mg/dL 116* 111* 126* 147*     Results from last 7 days   Lab Units 06/18/22  0619 06/17/22  0642 06/16/22  0553   WBC 10*3/mm3 12.63* 18.94* 23.74*   HEMOGLOBIN g/dL 12.9* 12.6* 13.6   HEMATOCRIT % 37.9 38.0 41.0   PLATELETS 10*3/mm3 173 152 168         Results from last 7 days   Lab Units  06/15/22  2335   TROPONIN T ng/mL <0.010     Results from last 7 days   Lab Units 06/16/22  0049 06/15/22  2350 06/15/22  2335   BLOODCX   --  No growth at 2 days No growth at 2 days   URINECX  >100,000 CFU/mL Gram Negative Bacilli*  --   --          I have reviewed the patient's laboratory results.    Radiology results:    No radiology results from the last 24 hrs  I have reviewed the patient's radiology reports.    Medication Review:     I have reviewed the patient's active and prn medications.     Problem List:      Urinary tract infection without hematuria      Assessment:    1. Acute metabolic encephalopathy, POA  2. Acute Multi-Drug Resistant Pseudomonas aeruginosa UTI with recent instrumentation, POA  3. CAD  4. BPH  5. Hypertension  6. Hypothyroidism  7. Parkinson's disease  8. Age-related debility    Plan:    -Urine culture with multi-drug resistant Pseudomonas aeruginosa. Patient had initially been on Ceftriaxone. Will discontinue this and place him on Cefepime per sensitivities.  -Received 24 hours of IV fluid resuscitation. Continue to hold IVF at this time as patient eating and drinking okay  -Continue home medications as warranted  -Continue PT/OT  -Patient will be discharged back to [facility] when medically stable. Anticipate in the next 24 hours    DVT Prophylaxis: Heparin  Code Status: DNR  Diet: Cardiac  Discharge Plan: Home at [facility] in the next 24 hours    Laquita Petit DO  06/18/22  10:30 EDT    Dictated utilizing Dragon dictation.

## (undated) DEVICE — KIT PRECISION ACCESS 5FR ECHO

## (undated) DEVICE — CATH ANGIO PROGREAT 2.8X150CM

## (undated) DEVICE — CONTRAST OMNIPAQUE 240 150ML

## (undated) DEVICE — PACK CATH LAB CUSTOM BR

## (undated) DEVICE — CATH TORCON NB C2 5F 65CM

## (undated) DEVICE — MICROSPHR HYDROPEARL 400UM 2ML

## (undated) DEVICE — SPONGE GELFOAM 12-7MM

## (undated) DEVICE — GUIDEWIRE ANGLED .035 150CM

## (undated) DEVICE — GUIDEWIRE FATHOM .016 X 180

## (undated) DEVICE — CATH PROGREAT 2.4FR 150CM 90CM